# Patient Record
Sex: FEMALE | Race: BLACK OR AFRICAN AMERICAN | NOT HISPANIC OR LATINO | Employment: OTHER | URBAN - METROPOLITAN AREA
[De-identification: names, ages, dates, MRNs, and addresses within clinical notes are randomized per-mention and may not be internally consistent; named-entity substitution may affect disease eponyms.]

---

## 2017-01-11 ENCOUNTER — PATIENT OUTREACH (OUTPATIENT)
Dept: ADMINISTRATIVE | Facility: HOSPITAL | Age: 62
End: 2017-01-11

## 2017-01-11 DIAGNOSIS — Z11.59 NEED FOR HEPATITIS C SCREENING TEST: Primary | ICD-10-CM

## 2017-02-07 ENCOUNTER — LAB VISIT (OUTPATIENT)
Dept: LAB | Facility: HOSPITAL | Age: 62
End: 2017-02-07
Attending: INTERNAL MEDICINE
Payer: COMMERCIAL

## 2017-02-07 DIAGNOSIS — E78.5 DYSLIPIDEMIA: Chronic | ICD-10-CM

## 2017-02-07 LAB
ALBUMIN SERPL BCP-MCNC: 3.5 G/DL
ALP SERPL-CCNC: 71 U/L
ALT SERPL W/O P-5'-P-CCNC: 23 U/L
ANION GAP SERPL CALC-SCNC: 5 MMOL/L
AST SERPL-CCNC: 21 U/L
BILIRUB SERPL-MCNC: 0.5 MG/DL
BUN SERPL-MCNC: 21 MG/DL
CALCIUM SERPL-MCNC: 9.4 MG/DL
CHLORIDE SERPL-SCNC: 105 MMOL/L
CHOLEST/HDLC SERPL: 3.2 {RATIO}
CO2 SERPL-SCNC: 28 MMOL/L
CREAT SERPL-MCNC: 1.1 MG/DL
EST. GFR  (AFRICAN AMERICAN): >60 ML/MIN/1.73 M^2
EST. GFR  (NON AFRICAN AMERICAN): 53.9 ML/MIN/1.73 M^2
GLUCOSE SERPL-MCNC: 90 MG/DL
HDL/CHOLESTEROL RATIO: 31.3 %
HDLC SERPL-MCNC: 144 MG/DL
HDLC SERPL-MCNC: 45 MG/DL
LDLC SERPL CALC-MCNC: 89.4 MG/DL
NONHDLC SERPL-MCNC: 99 MG/DL
POTASSIUM SERPL-SCNC: 4.1 MMOL/L
PROT SERPL-MCNC: 7.2 G/DL
SODIUM SERPL-SCNC: 138 MMOL/L
TRIGL SERPL-MCNC: 48 MG/DL

## 2017-02-07 PROCEDURE — 36415 COLL VENOUS BLD VENIPUNCTURE: CPT | Mod: PO

## 2017-02-07 PROCEDURE — 80061 LIPID PANEL: CPT

## 2017-02-07 PROCEDURE — 80053 COMPREHEN METABOLIC PANEL: CPT

## 2017-03-13 ENCOUNTER — PATIENT MESSAGE (OUTPATIENT)
Dept: RESEARCH | Facility: HOSPITAL | Age: 62
End: 2017-03-13

## 2017-06-23 ENCOUNTER — OFFICE VISIT (OUTPATIENT)
Dept: CARDIOLOGY | Facility: CLINIC | Age: 62
End: 2017-06-23
Payer: COMMERCIAL

## 2017-06-23 VITALS
WEIGHT: 257 LBS | HEART RATE: 72 BPM | BODY MASS INDEX: 38.95 KG/M2 | SYSTOLIC BLOOD PRESSURE: 146 MMHG | HEIGHT: 68 IN | DIASTOLIC BLOOD PRESSURE: 74 MMHG

## 2017-06-23 DIAGNOSIS — I25.10 CORONARY ARTERY DISEASE, OCCLUSIVE: Primary | Chronic | ICD-10-CM

## 2017-06-23 DIAGNOSIS — I20.9 ANGINA PECTORIS: ICD-10-CM

## 2017-06-23 DIAGNOSIS — R07.9 CHEST PAIN, UNSPECIFIED TYPE: ICD-10-CM

## 2017-06-23 DIAGNOSIS — I43 HYPERTENSIVE CARDIOMYOPATHY, WITH HEART FAILURE: ICD-10-CM

## 2017-06-23 DIAGNOSIS — I11.0 HYPERTENSIVE CARDIOMYOPATHY, WITH HEART FAILURE: ICD-10-CM

## 2017-06-23 PROCEDURE — 99214 OFFICE O/P EST MOD 30 MIN: CPT | Mod: S$GLB,,, | Performed by: NUCLEAR MEDICINE

## 2017-06-23 PROCEDURE — 99999 PR PBB SHADOW E&M-EST. PATIENT-LVL III: CPT | Mod: PBBFAC,,, | Performed by: NUCLEAR MEDICINE

## 2017-06-23 RX ORDER — NITROGLYCERIN 0.4 MG/1
0.4 TABLET SUBLINGUAL EVERY 5 MIN PRN
Qty: 30 TABLET | Refills: 6 | Status: SHIPPED | OUTPATIENT
Start: 2017-06-23 | End: 2022-10-06 | Stop reason: SDUPTHER

## 2017-06-23 NOTE — PROGRESS NOTES
Subjective:   Patient ID:  Kate Newman is a 61 y.o. female who presents for follow-up of Coronary Artery Disease (followup) and Hypertension      HPI 1- CHRONIC CAD , ANGINA FC 2- POST PCI   2- ESSENTIAL HTN  NO RECURRENT ANGINA  FOR LAST 3 MONTHS. NO ANGINA EQUIVALENT.  PATTERN OF THAKKAR STABLE-MODERATE TO SEVERE EXERTION- NO ORTHOPNEA OR PND  NO PALPITATIONS. NO NEAR SYNCOPE OR SYNCOPE  NO EDEMA. NO CALVE TENDERNESS. NO  INTERMITTENT CLAUDICATION  NO ABDOMINAL PAIN  NO FOCAL CNS SYMPTOMS OR SIGNS TO SUGGEST TIA OR STROKE  CARD MED GOOD COMPLIANCE      Review of Systems   Constitution: Negative for chills, fever, weakness, night sweats, weight gain and weight loss.   HENT: Negative for headaches and nosebleeds.    Eyes: Negative for blurred vision, double vision and visual disturbance.   Cardiovascular: Negative for chest pain, dyspnea on exertion, irregular heartbeat, leg swelling, orthopnea, palpitations, paroxysmal nocturnal dyspnea and syncope.   Respiratory: Negative for cough, hemoptysis and wheezing.    Endocrine: Negative for polydipsia and polyuria.   Hematologic/Lymphatic: Does not bruise/bleed easily.   Skin: Negative for rash.   Musculoskeletal: Negative for joint pain, joint swelling, muscle weakness and myalgias.   Gastrointestinal: Negative for abdominal pain, hematemesis, jaundice and melena.   Genitourinary: Negative for dysuria, hematuria and nocturia.   Neurological: Negative for dizziness, focal weakness and sensory change.   Psychiatric/Behavioral: Negative for depression. The patient does not have insomnia and is not nervous/anxious.      Family History   Problem Relation Age of Onset    Hypertension Mother     Heart disease Mother     Heart attack Mother 64     MI    Diabetes Father     Osteoarthritis Sister     Heart disease Sister     Cancer Sister      1  sister had cervical ca    Osteoarthritis Brother     Heart disease Brother      Past Medical History:   Diagnosis Date     Arthritis     Asthma     Hypertension      Current Outpatient Prescriptions on File Prior to Visit   Medication Sig Dispense Refill    albuterol (PROVENTIL HFA) 90 mcg/actuation inhaler Inhale 2 puffs into the lungs every 4 (four) hours as needed for Wheezing. 2 puffs two times daily 18 g 2    aspirin (ECOTRIN) 81 MG EC tablet Take 1 tablet (81 mg total) by mouth once daily. 30 tablet 0    atorvastatin (LIPITOR) 20 MG tablet Take 1 tablet (20 mg total) by mouth every evening. 30 tablet 11    hydrochlorothiazide (HYDRODIURIL) 25 MG tablet TAKE ONE TABLET BY MOUTH EVERY DAY 30 tablet 0    isosorbide mononitrate (IMDUR) 30 MG 24 hr tablet Take 1 tablet (30 mg total) by mouth once daily. 30 tablet 6    metoprolol tartrate (LOPRESSOR) 25 MG tablet Take 1 tablet (25 mg total) by mouth 2 (two) times daily. 60 tablet 6    [DISCONTINUED] lisinopril (PRINIVIL,ZESTRIL) 40 MG tablet Take 1 tablet (40 mg total) by mouth once daily. 30 tablet 6    [DISCONTINUED] nitroGLYCERIN (NITROSTAT) 0.4 MG SL tablet Place 1 tablet (0.4 mg total) under the tongue every 5 (five) minutes as needed for Chest pain. 30 tablet 0     No current facility-administered medications on file prior to visit.      Review of patient's allergies indicates:  No Known Allergies    Objective:     Physical Exam   Constitutional: She is oriented to person, place, and time. She appears well-developed. No distress.   HENT:   Head: Normocephalic.   Eyes: Conjunctivae are normal. Pupils are equal, round, and reactive to light. No scleral icterus.   Neck: Normal range of motion. Neck supple. Normal carotid pulses, no hepatojugular reflux and no JVD present. Carotid bruit is not present. No edema present. No thyroid mass and no thyromegaly present.   Cardiovascular: Normal rate, regular rhythm, S1 normal, S2 normal, normal heart sounds and intact distal pulses.  PMI is not displaced.  Exam reveals no gallop and no friction rub.    No murmur  heard.  Pulses:       Carotid pulses are 2+ on the right side, and 2+ on the left side.       Radial pulses are 2+ on the right side, and 2+ on the left side.        Femoral pulses are 2+ on the right side, and 2+ on the left side.       Popliteal pulses are 2+ on the right side, and 2+ on the left side.        Dorsalis pedis pulses are 2+ on the right side, and 2+ on the left side.        Posterior tibial pulses are 2+ on the right side, and 2+ on the left side.   Pulmonary/Chest: Effort normal and breath sounds normal. She has no wheezes. She has no rales. She exhibits no tenderness.   Abdominal: Soft. Bowel sounds are normal. She exhibits no pulsatile midline mass and no mass. There is no hepatosplenomegaly. There is no tenderness.   Musculoskeletal: Normal range of motion. She exhibits no edema or tenderness.        Cervical back: Normal.        Thoracic back: Normal.        Lumbar back: Normal.   Lymphadenopathy:     She has no cervical adenopathy.     She has no axillary adenopathy.        Right: No supraclavicular adenopathy present.        Left: No supraclavicular adenopathy present.   Neurological: She is alert and oriented to person, place, and time. She has normal strength and normal reflexes. No sensory deficit. Gait normal.   Skin: Skin is warm. No rash noted. No cyanosis. No pallor. Nails show no clubbing.   Psychiatric: She has a normal mood and affect. Her speech is normal and behavior is normal. Cognition and memory are normal.       Assessment:     1. Coronary artery disease, occlusive    2. Angina pectoris    3. Hypertensive cardiomyopathy, with heart failure      STABLE CV STATUS-  NO ACTIVE MYOCARDIAL ISCHEMIA  NO ARRYTHMIAS  BP WELL CONTROLLED  NO ACUTE HF  CNS STATUS STABLE   CARD MED WELL TOLERATED  Plan:     Coronary artery disease, occlusive    Angina pectoris    Hypertensive cardiomyopathy, with heart failure    1- CONTINUE PRESENT CARD MANAGEMENT    2- RETURN IN 6 MONTHS.

## 2018-01-31 DIAGNOSIS — I10 BENIGN ESSENTIAL HTN: Primary | ICD-10-CM

## 2019-04-11 ENCOUNTER — TELEPHONE (OUTPATIENT)
Dept: CARDIOLOGY | Facility: CLINIC | Age: 64
End: 2019-04-11

## 2019-04-11 NOTE — TELEPHONE ENCOUNTER
Returned call. Patient needs preop clearance for TKR.  Appt scheduled for 04/16/2019 at 0900, [facility].    Address, phone info provided.

## 2019-04-16 ENCOUNTER — OFFICE VISIT (OUTPATIENT)
Dept: CARDIOLOGY | Facility: CLINIC | Age: 64
End: 2019-04-16
Payer: MEDICAID

## 2019-04-16 ENCOUNTER — CLINICAL SUPPORT (OUTPATIENT)
Dept: CARDIOLOGY | Facility: CLINIC | Age: 64
End: 2019-04-16
Payer: MEDICAID

## 2019-04-16 VITALS
DIASTOLIC BLOOD PRESSURE: 74 MMHG | HEIGHT: 68 IN | WEIGHT: 264 LBS | BODY MASS INDEX: 40.01 KG/M2 | HEART RATE: 65 BPM | SYSTOLIC BLOOD PRESSURE: 132 MMHG

## 2019-04-16 DIAGNOSIS — I25.10 CORONARY ARTERY DISEASE, OCCLUSIVE: Chronic | ICD-10-CM

## 2019-04-16 DIAGNOSIS — R94.31 NONSPECIFIC ABNORMAL ELECTROCARDIOGRAM (ECG) (EKG): ICD-10-CM

## 2019-04-16 DIAGNOSIS — Z01.818 PRE-OP EVALUATION: Primary | ICD-10-CM

## 2019-04-16 DIAGNOSIS — R06.09 DOE (DYSPNEA ON EXERTION): ICD-10-CM

## 2019-04-16 DIAGNOSIS — Z01.818 PRE-OPERATIVE CLEARANCE: Primary | ICD-10-CM

## 2019-04-16 DIAGNOSIS — E66.01 MORBID OBESITY WITH BMI OF 40.0-44.9, ADULT: ICD-10-CM

## 2019-04-16 DIAGNOSIS — Z01.818 PRE-OP EVALUATION: ICD-10-CM

## 2019-04-16 DIAGNOSIS — E78.5 DYSLIPIDEMIA: Chronic | ICD-10-CM

## 2019-04-16 DIAGNOSIS — Z82.49 FAMILY HISTORY OF ISCHEMIC HEART DISEASE (IHD): ICD-10-CM

## 2019-04-16 DIAGNOSIS — I10 ESSENTIAL HYPERTENSION: Chronic | ICD-10-CM

## 2019-04-16 DIAGNOSIS — Z95.5 S/P CORONARY ARTERY STENT PLACEMENT: ICD-10-CM

## 2019-04-16 DIAGNOSIS — E78.5 HYPERLIPIDEMIA, UNSPECIFIED HYPERLIPIDEMIA TYPE: ICD-10-CM

## 2019-04-16 PROCEDURE — 99213 OFFICE O/P EST LOW 20 MIN: CPT | Mod: PBBFAC,PN | Performed by: PHYSICIAN ASSISTANT

## 2019-04-16 PROCEDURE — 99214 PR OFFICE/OUTPT VISIT, EST, LEVL IV, 30-39 MIN: ICD-10-PCS | Mod: S$PBB,,, | Performed by: PHYSICIAN ASSISTANT

## 2019-04-16 PROCEDURE — 93010 ELECTROCARDIOGRAM REPORT: CPT | Mod: S$PBB,,, | Performed by: INTERNAL MEDICINE

## 2019-04-16 PROCEDURE — 99214 OFFICE O/P EST MOD 30 MIN: CPT | Mod: S$PBB,,, | Performed by: PHYSICIAN ASSISTANT

## 2019-04-16 PROCEDURE — 99999 PR PBB SHADOW E&M-EST. PATIENT-LVL III: ICD-10-PCS | Mod: PBBFAC,,, | Performed by: PHYSICIAN ASSISTANT

## 2019-04-16 PROCEDURE — 93005 ELECTROCARDIOGRAM TRACING: CPT | Mod: PBBFAC,PN | Performed by: INTERNAL MEDICINE

## 2019-04-16 PROCEDURE — 99999 PR PBB SHADOW E&M-EST. PATIENT-LVL III: CPT | Mod: PBBFAC,,, | Performed by: PHYSICIAN ASSISTANT

## 2019-04-16 PROCEDURE — 93010 EKG 12-LEAD: ICD-10-PCS | Mod: S$PBB,,, | Performed by: INTERNAL MEDICINE

## 2019-04-16 RX ORDER — OXYBUTYNIN CHLORIDE 15 MG/1
5 TABLET, EXTENDED RELEASE ORAL DAILY
COMMUNITY

## 2019-04-16 NOTE — PROGRESS NOTES
"Subjective:    Patient ID:  Kate Lira is a 63 y.o. female who presents for follow-up of Pre-op Exam      HPI   Ms. Lira is a 63 year old female patient whose current medical conditions include CAD s/p PCI of distal LAD in 2015, dyslipidemia, HTN, and obesity who presents today for follow-up and pre-op clearance. Patient last seen in clinic by Dr. Morgan in 2017. She returns today and states she is doing ok. Complains of occasional "sticking" chest discomfort, substernal or underneath her left breast. Seems to happen at night when she lies down. Not precipitated by exertion. Denies any radiation of discomfort or any associated nausea, vomiting, or diaphoresis. Reports THAKKAR when she walks/exerts herself, states this has been a chronic issue. No change in the past 6 months. Do report occasional palpitations at night. No dizziness, lightheadedness, near syncope, or syncope. BP ok today in clinic. Patient reports compliance with her medications, states last sublingual nitro usage was approximately 2 months ago. Activity is limited due to right knee pain, patient states she could not walk up two flights of stairs. EKG today reviewed, shows SR, LVH changes.      Review of Systems   Constitution: Negative for chills, decreased appetite, fever and malaise/fatigue.   HENT: Negative for congestion, hoarse voice and sore throat.    Eyes: Negative for blurred vision and discharge.   Cardiovascular: Positive for chest pain (atypical) and dyspnea on exertion. Negative for claudication, cyanosis, irregular heartbeat, leg swelling, near-syncope, orthopnea, palpitations and paroxysmal nocturnal dyspnea.   Respiratory: Negative for cough, hemoptysis, shortness of breath, snoring, sputum production and wheezing.    Endocrine: Negative for cold intolerance and heat intolerance.   Hematologic/Lymphatic: Negative for bleeding problem. Does not bruise/bleed easily.   Skin: Negative for rash.   Musculoskeletal: Positive for " "arthritis and joint pain. Negative for back pain, joint swelling, muscle cramps, muscle weakness and myalgias.   Gastrointestinal: Negative for abdominal pain, constipation, diarrhea, heartburn, melena and nausea.   Genitourinary: Negative for hematuria.   Neurological: Negative for dizziness, focal weakness, headaches, light-headedness, loss of balance, numbness, paresthesias, seizures and weakness.   Psychiatric/Behavioral: Negative for memory loss. The patient does not have insomnia.    Allergic/Immunologic: Negative for hives.     /74 (BP Location: Right arm, Patient Position: Sitting, BP Method: Large (Manual))   Pulse 65   Ht 5' 8" (1.727 m)   Wt 119.7 kg (264 lb)   BMI 40.14 kg/m²   Objective:    Physical Exam   Constitutional: She is oriented to person, place, and time. She appears well-developed and well-nourished. No distress.   HENT:   Head: Normocephalic and atraumatic.   Eyes: Pupils are equal, round, and reactive to light. Right eye exhibits no discharge. Left eye exhibits no discharge.   Neck: Neck supple. No JVD present.   Cardiovascular: Normal rate, regular rhythm, S1 normal, S2 normal and normal heart sounds.   No murmur heard.  Pulmonary/Chest: Effort normal and breath sounds normal. No respiratory distress. She has no wheezes. She has no rales.   Abdominal: Soft. She exhibits no distension.   Musculoskeletal: She exhibits no edema.   Neurological: She is alert and oriented to person, place, and time.   Skin: Skin is warm and dry. She is not diaphoretic. No erythema.   Psychiatric: She has a normal mood and affect. Her behavior is normal. Thought content normal.   Nursing note and vitals reviewed.      Assessment:       1. Pre-operative clearance    2. Coronary artery disease, occlusive    3. Dyslipidemia    4. Essential hypertension    5. Hyperlipidemia, unspecified hyperlipidemia type    6. S/P coronary artery stent placement    7. Morbid obesity with BMI of 40.0-44.9, adult    8. " Family history of ischemic heart disease (IHD)    9. Nonspecific abnormal electrocardiogram (ECG) (EKG)    10. THAKKAR (dyspnea on exertion)      Patient presents for f/u. Doing ok however she is having some atypical chest pain symptoms and THAKKAR. Given her history of CAD s/p stent and limited activity tolerance, will proceed with MPI stress test and 2D echo for further evaluation. Continue same meds. Clearance pending results of above tests.   Plan:   -Continue current medical management and risk factor modification  -Cardiac, low salt diet  -Stress test, 2D echo, phone review  -Clearance pending results  -Follow-up TBA    Chart reviewed. Dr. Morgan agrees with plan as outlined above.     ADDENDUM:  Stress test negative for ischemia/injury. 2D echo showed normal EF. Ok to proceed with surgery at moderate risk of manuel and post OP CV complications.

## 2019-04-30 ENCOUNTER — HOSPITAL ENCOUNTER (OUTPATIENT)
Dept: RADIOLOGY | Facility: HOSPITAL | Age: 64
Discharge: HOME | End: 2019-04-30
Attending: PHYSICIAN ASSISTANT
Payer: MEDICAID

## 2019-04-30 ENCOUNTER — CLINICAL SUPPORT (OUTPATIENT)
Dept: CARDIOLOGY | Facility: CLINIC | Age: 64
End: 2019-04-30
Attending: PHYSICIAN ASSISTANT
Payer: MEDICAID

## 2019-04-30 DIAGNOSIS — E78.5 DYSLIPIDEMIA: Chronic | ICD-10-CM

## 2019-04-30 DIAGNOSIS — E78.5 HYPERLIPIDEMIA, UNSPECIFIED HYPERLIPIDEMIA TYPE: ICD-10-CM

## 2019-04-30 DIAGNOSIS — I10 ESSENTIAL HYPERTENSION: Chronic | ICD-10-CM

## 2019-04-30 DIAGNOSIS — Z95.5 S/P CORONARY ARTERY STENT PLACEMENT: ICD-10-CM

## 2019-04-30 DIAGNOSIS — I25.10 CORONARY ARTERY DISEASE, OCCLUSIVE: Chronic | ICD-10-CM

## 2019-04-30 DIAGNOSIS — E66.01 MORBID OBESITY WITH BMI OF 40.0-44.9, ADULT: ICD-10-CM

## 2019-04-30 LAB
DIASTOLIC DYSFUNCTION: NO
DIASTOLIC DYSFUNCTION: NO
ESTIMATED PA SYSTOLIC PRESSURE: 23.43
MITRAL VALVE REGURGITATION: NORMAL
RETIRED EF AND QEF - SEE NOTES: 55 (ref 55–65)
TRICUSPID VALVE REGURGITATION: NORMAL

## 2019-04-30 PROCEDURE — 78452 NM MULTI PHARM STRESS CARDIAC COMPONENT: ICD-10-PCS | Mod: 26,,, | Performed by: INTERNAL MEDICINE

## 2019-04-30 PROCEDURE — 93016 CV STRESS TEST SUPVJ ONLY: CPT | Mod: S$PBB,,, | Performed by: INTERNAL MEDICINE

## 2019-04-30 PROCEDURE — A9502 TC99M TETROFOSMIN: HCPCS

## 2019-04-30 PROCEDURE — 93018 CV STRESS TEST I&R ONLY: CPT | Mod: S$PBB,,, | Performed by: INTERNAL MEDICINE

## 2019-04-30 PROCEDURE — 93306 2D ECHO WITH COLOR FLOW DOPPLER: ICD-10-PCS | Mod: 26,S$PBB,, | Performed by: INTERNAL MEDICINE

## 2019-04-30 PROCEDURE — 78452 HT MUSCLE IMAGE SPECT MULT: CPT | Mod: 26,,, | Performed by: INTERNAL MEDICINE

## 2019-04-30 PROCEDURE — 93306 TTE W/DOPPLER COMPLETE: CPT | Mod: PBBFAC,PN | Performed by: INTERNAL MEDICINE

## 2019-04-30 PROCEDURE — 93018 NM MULTI PHARM STRESS CARDIAC COMPONENT: ICD-10-PCS | Mod: S$PBB,,, | Performed by: INTERNAL MEDICINE

## 2019-04-30 PROCEDURE — 93016 NM MULTI PHARM STRESS CARDIAC COMPONENT: ICD-10-PCS | Mod: S$PBB,,, | Performed by: INTERNAL MEDICINE

## 2019-05-01 ENCOUNTER — TELEPHONE (OUTPATIENT)
Dept: CARDIOLOGY | Facility: CLINIC | Age: 64
End: 2019-05-01

## 2019-05-01 NOTE — TELEPHONE ENCOUNTER
Please phone patient. She passed her stress test. Echo showed normal heart function.    Ok to proceed with surgery at moderate risk of manuel and post OP CV complications.    I will addend my clinic note, find out where we need to fax it    Thanks

## 2019-05-01 NOTE — TELEPHONE ENCOUNTER
----- Message from Crystal Magaña sent at 5/1/2019  8:50 AM CDT -----  Contact: self [phone]  States that the fax number that stress test and echo results can be faxed to is [phone] Attn: David Melendez. Please call back at [phone]//thank you acc

## 2019-05-01 NOTE — TELEPHONE ENCOUNTER
Returned pt call informed pt I have received fax number and faxed over clearance to David Melendez fax number [phone] fax confirmation received.

## 2019-05-01 NOTE — TELEPHONE ENCOUNTER
The patient has been notified of this information and all questions answered.      Pt states she will call back with information to fax clearance. I provided pt with our office call back number [phone].

## 2019-12-11 ENCOUNTER — OFFICE VISIT (OUTPATIENT)
Dept: OPHTHALMOLOGY | Facility: CLINIC | Age: 64
End: 2019-12-11
Payer: COMMERCIAL

## 2019-12-11 DIAGNOSIS — H04.129 DRY EYE: ICD-10-CM

## 2019-12-11 DIAGNOSIS — H40.052 OCULAR HYPERTENSION OF LEFT EYE: Primary | ICD-10-CM

## 2019-12-11 DIAGNOSIS — H40.013 OPEN ANGLE WITH BORDERLINE FINDINGS OF BOTH EYES: ICD-10-CM

## 2019-12-11 PROCEDURE — 99999 PR PBB SHADOW E&M-EST. PATIENT-LVL I: CPT | Mod: PBBFAC,,, | Performed by: OPTOMETRIST

## 2019-12-11 PROCEDURE — 92002 PR EYE EXAM, NEW PATIENT,INTERMED: ICD-10-PCS | Mod: S$GLB,,, | Performed by: OPTOMETRIST

## 2019-12-11 PROCEDURE — 92002 INTRM OPH EXAM NEW PATIENT: CPT | Mod: S$GLB,,, | Performed by: OPTOMETRIST

## 2019-12-11 PROCEDURE — 99999 PR PBB SHADOW E&M-EST. PATIENT-LVL I: ICD-10-PCS | Mod: PBBFAC,,, | Performed by: OPTOMETRIST

## 2019-12-11 PROCEDURE — 92133 CPTRZD OPH DX IMG PST SGM ON: CPT | Mod: S$GLB,,, | Performed by: OPTOMETRIST

## 2019-12-11 PROCEDURE — 92133 POSTERIOR SEGMENT OCT OPTIC NERVE(OCULAR COHERENCE TOMOGRAPHY) - OU - BOTH EYES: ICD-10-PCS | Mod: S$GLB,,, | Performed by: OPTOMETRIST

## 2019-12-11 RX ORDER — LATANOPROST 50 UG/ML
1 SOLUTION/ DROPS OPHTHALMIC NIGHTLY
Qty: 1 BOTTLE | Refills: 4 | Status: SHIPPED | OUTPATIENT
Start: 2019-12-11 | End: 2020-04-27

## 2019-12-11 NOTE — PROGRESS NOTES
HPI     NP to DNL  Patient here today for eye irritation. Patient states the left eye is more   irritated than the right. Symptoms have been present for about 2 weeks.  Pain Scale:  4  Onset:   2 weeks  OD, OS, OU:   OU OS>OD  Discharge:   No  A.M. Matting:  No  Itch:   Yes  Redness:   No  Photophobia:   No  Foreign body sensation:   Yes  Deep pain:   No  Previous occurrence:   No  Drops:   Dry eyes Drops    Last edited by Ashley Beltran, PCT on 12/11/2019  8:29 AM.   (History)            Assessment /Plan     For exam results, see Encounter Report.    Ocular hypertension of left eye  -     latanoprost 0.005 % ophthalmic solution; Place 1 drop into both eyes every evening.  Dispense: 1 Bottle; Refill: 4    Open angle with borderline findings of both eyes  Suspect based on increased IOP OS>OD  Start latanoprost    Dry eye  Start Theratears bid OU    RTC 1-2 weeks for 24-2VF, dilation and Optos SDPs with Pach  Discussed above and all questions were answered.

## 2019-12-30 ENCOUNTER — OFFICE VISIT (OUTPATIENT)
Dept: OPHTHALMOLOGY | Facility: CLINIC | Age: 64
End: 2019-12-30
Payer: COMMERCIAL

## 2019-12-30 DIAGNOSIS — H40.1111 PRIMARY OPEN ANGLE GLAUCOMA (POAG) OF RIGHT EYE, MILD STAGE: ICD-10-CM

## 2019-12-30 DIAGNOSIS — H04.129 DRY EYE: ICD-10-CM

## 2019-12-30 DIAGNOSIS — H40.1123 PRIMARY OPEN ANGLE GLAUCOMA (POAG) OF LEFT EYE, SEVERE STAGE: Primary | ICD-10-CM

## 2019-12-30 PROCEDURE — 92250 COLOR FUNDUS PHOTOGRAPHY - OU - BOTH EYES: ICD-10-PCS | Mod: S$GLB,,, | Performed by: OPTOMETRIST

## 2019-12-30 PROCEDURE — 92250 FUNDUS PHOTOGRAPHY W/I&R: CPT | Mod: S$GLB,,, | Performed by: OPTOMETRIST

## 2019-12-30 PROCEDURE — 92083 HUMPHREY VISUAL FIELD - OU - BOTH EYES: ICD-10-PCS | Mod: S$GLB,,, | Performed by: OPTOMETRIST

## 2019-12-30 PROCEDURE — 92014 PR EYE EXAM, EST PATIENT,COMPREHESV: ICD-10-PCS | Mod: S$GLB,,, | Performed by: OPTOMETRIST

## 2019-12-30 PROCEDURE — 99999 PR PBB SHADOW E&M-EST. PATIENT-LVL I: ICD-10-PCS | Mod: PBBFAC,,, | Performed by: OPTOMETRIST

## 2019-12-30 PROCEDURE — 92014 COMPRE OPH EXAM EST PT 1/>: CPT | Mod: S$GLB,,, | Performed by: OPTOMETRIST

## 2019-12-30 PROCEDURE — 92083 EXTENDED VISUAL FIELD XM: CPT | Mod: S$GLB,,, | Performed by: OPTOMETRIST

## 2019-12-30 PROCEDURE — 99999 PR PBB SHADOW E&M-EST. PATIENT-LVL I: CPT | Mod: PBBFAC,,, | Performed by: OPTOMETRIST

## 2019-12-30 RX ORDER — BRIMONIDINE TARTRATE 2 MG/ML
1 SOLUTION/ DROPS OPHTHALMIC 3 TIMES DAILY
Qty: 5 ML | Refills: 2 | Status: SHIPPED | OUTPATIENT
Start: 2019-12-30 | End: 2020-03-20

## 2019-12-30 RX ORDER — MELOXICAM 15 MG/1
15 TABLET ORAL
COMMUNITY
Start: 2019-12-10 | End: 2020-12-09

## 2019-12-30 NOTE — PROGRESS NOTES
HPI     Pt here for 3 wk HVF SDP chk. 100% compliant with Latanoprost  HPI    Any vision changes since last exam: No   Eye pain: None  Other ocular symptoms: No    Do you wear currently wear glasses or contacts? Readers    Interested in contacts today? No    Do you plan on getting new glasses today? If needed        Last edited by Urban Martin, Patient Care Assistant on 12/30/2019 10:40   AM. (History)            Assessment /Plan     For exam results, see Encounter Report.    Primary open angle glaucoma (POAG) of left eye, severe stage  -     Fitch Visual Field - OU - Extended - Both Eyes  -     Color Fundus Photography - OU - Both Eyes    Staged today based on HVF and Optic nerve findings. APD present OS today. Discussed SLT vs. Additional med and patient chooses to add brimonidine TID OS.  Continue Latanoprost qhs OU      Primary open angle glaucoma (POAG) of right eye, mild stage  Mild stage based on HVF findings (early superior arc). Low IOP, and good optic nerve exam. Will follow, and continue latanoprost.    Dry eye      RTC 6 weeks for IOP check or PRN.

## 2020-02-10 ENCOUNTER — OFFICE VISIT (OUTPATIENT)
Dept: OPHTHALMOLOGY | Facility: CLINIC | Age: 65
End: 2020-02-10
Payer: COMMERCIAL

## 2020-02-10 DIAGNOSIS — H40.1111 PRIMARY OPEN ANGLE GLAUCOMA (POAG) OF RIGHT EYE, MILD STAGE: ICD-10-CM

## 2020-02-10 DIAGNOSIS — H40.1123 PRIMARY OPEN ANGLE GLAUCOMA (POAG) OF LEFT EYE, SEVERE STAGE: Primary | ICD-10-CM

## 2020-02-10 PROCEDURE — 92012 PR EYE EXAM, EST PATIENT,INTERMED: ICD-10-PCS | Mod: S$GLB,,, | Performed by: OPTOMETRIST

## 2020-02-10 PROCEDURE — 99999 PR PBB SHADOW E&M-EST. PATIENT-LVL I: CPT | Mod: PBBFAC,,, | Performed by: OPTOMETRIST

## 2020-02-10 PROCEDURE — 99999 PR PBB SHADOW E&M-EST. PATIENT-LVL I: ICD-10-PCS | Mod: PBBFAC,,, | Performed by: OPTOMETRIST

## 2020-02-10 PROCEDURE — 92012 INTRM OPH EXAM EST PATIENT: CPT | Mod: S$GLB,,, | Performed by: OPTOMETRIST

## 2020-02-10 NOTE — PROGRESS NOTES
HPI     The patient is here for a 6 week IOP check. The patient is using   Latanoprost QHS OU and Brimonidine TID OS. The patient states her eyes are   feeling better and she denies any ocular pain at this time. 100% drop   compliance    Last edited by Karen Flowers on 2/10/2020  9:06 AM. (History)            Assessment /Plan     For exam results, see Encounter Report.    Primary open angle glaucoma (POAG) of left eye, severe stage    Primary open angle glaucoma (POAG) of right eye, mild stage      IOP stable today and within acceptable range OU  Continue current treatment  Monitor 4 months    Latanoprost qhs OU  Brimonidine tid OS    RTC 3-4 months for IOP check or PRN  Discussed above and all questions were answered.

## 2020-02-18 ENCOUNTER — HOSPITAL ENCOUNTER (OUTPATIENT)
Dept: RADIOLOGY | Facility: HOSPITAL | Age: 65
Discharge: HOME | End: 2020-02-18
Attending: PHYSICAL MEDICINE & REHABILITATION
Payer: COMMERCIAL

## 2020-02-18 ENCOUNTER — TELEPHONE (OUTPATIENT)
Dept: PAIN MEDICINE | Facility: CLINIC | Age: 65
End: 2020-02-18

## 2020-02-18 ENCOUNTER — OFFICE VISIT (OUTPATIENT)
Dept: PHYSICAL MEDICINE AND REHAB | Facility: CLINIC | Age: 65
End: 2020-02-18
Payer: COMMERCIAL

## 2020-02-18 ENCOUNTER — PATIENT MESSAGE (OUTPATIENT)
Dept: PHYSICAL MEDICINE AND REHAB | Facility: CLINIC | Age: 65
End: 2020-02-18

## 2020-02-18 VITALS
BODY MASS INDEX: 40.01 KG/M2 | HEART RATE: 62 BPM | WEIGHT: 264 LBS | HEIGHT: 68 IN | SYSTOLIC BLOOD PRESSURE: 198 MMHG | DIASTOLIC BLOOD PRESSURE: 85 MMHG | RESPIRATION RATE: 14 BRPM

## 2020-02-18 DIAGNOSIS — M47.816 LUMBAR FACET ARTHROPATHY: Primary | ICD-10-CM

## 2020-02-18 DIAGNOSIS — M47.26 OSTEOARTHRITIS OF SPINE WITH RADICULOPATHY, LUMBAR REGION: ICD-10-CM

## 2020-02-18 DIAGNOSIS — M47.816 LUMBAR FACET ARTHROPATHY: ICD-10-CM

## 2020-02-18 DIAGNOSIS — R26.9 IMPAIRED GAIT: ICD-10-CM

## 2020-02-18 PROCEDURE — 99204 OFFICE O/P NEW MOD 45 MIN: CPT | Mod: S$GLB,,, | Performed by: PHYSICAL MEDICINE & REHABILITATION

## 2020-02-18 PROCEDURE — 3008F BODY MASS INDEX DOCD: CPT | Mod: CPTII,S$GLB,, | Performed by: PHYSICAL MEDICINE & REHABILITATION

## 2020-02-18 PROCEDURE — 3077F PR MOST RECENT SYSTOLIC BLOOD PRESSURE >= 140 MM HG: ICD-10-PCS | Mod: CPTII,S$GLB,, | Performed by: PHYSICAL MEDICINE & REHABILITATION

## 2020-02-18 PROCEDURE — 3079F PR MOST RECENT DIASTOLIC BLOOD PRESSURE 80-89 MM HG: ICD-10-PCS | Mod: CPTII,S$GLB,, | Performed by: PHYSICAL MEDICINE & REHABILITATION

## 2020-02-18 PROCEDURE — 3008F PR BODY MASS INDEX (BMI) DOCUMENTED: ICD-10-PCS | Mod: CPTII,S$GLB,, | Performed by: PHYSICAL MEDICINE & REHABILITATION

## 2020-02-18 PROCEDURE — 72110 X-RAY EXAM L-2 SPINE 4/>VWS: CPT | Mod: TC

## 2020-02-18 PROCEDURE — 72110 X-RAY EXAM L-2 SPINE 4/>VWS: CPT | Mod: 26,,, | Performed by: RADIOLOGY

## 2020-02-18 PROCEDURE — 99204 PR OFFICE/OUTPT VISIT, NEW, LEVL IV, 45-59 MIN: ICD-10-PCS | Mod: S$GLB,,, | Performed by: PHYSICAL MEDICINE & REHABILITATION

## 2020-02-18 PROCEDURE — 3079F DIAST BP 80-89 MM HG: CPT | Mod: CPTII,S$GLB,, | Performed by: PHYSICAL MEDICINE & REHABILITATION

## 2020-02-18 PROCEDURE — 99999 PR PBB SHADOW E&M-EST. PATIENT-LVL IV: CPT | Mod: PBBFAC,,, | Performed by: PHYSICAL MEDICINE & REHABILITATION

## 2020-02-18 PROCEDURE — 3077F SYST BP >= 140 MM HG: CPT | Mod: CPTII,S$GLB,, | Performed by: PHYSICAL MEDICINE & REHABILITATION

## 2020-02-18 PROCEDURE — 99999 PR PBB SHADOW E&M-EST. PATIENT-LVL IV: ICD-10-PCS | Mod: PBBFAC,,, | Performed by: PHYSICAL MEDICINE & REHABILITATION

## 2020-02-18 PROCEDURE — 72110 XR LUMBAR SPINE 5 VIEW WITH FLEX AND EXT: ICD-10-PCS | Mod: 26,,, | Performed by: RADIOLOGY

## 2020-02-18 RX ORDER — DICLOFENAC SODIUM 10 MG/G
GEL TOPICAL
COMMUNITY

## 2020-02-18 RX ORDER — ERGOCALCIFEROL 1.25 MG/1
50000 CAPSULE ORAL
COMMUNITY
Start: 2020-02-05 | End: 2021-02-04

## 2020-02-18 RX ORDER — HYDROXYZINE PAMOATE 100 MG/1
1 CAPSULE ORAL 2 TIMES DAILY PRN
COMMUNITY
Start: 2019-12-10

## 2020-02-18 RX ORDER — FLUTICASONE FUROATE AND VILANTEROL 100; 25 UG/1; UG/1
1 POWDER RESPIRATORY (INHALATION)
COMMUNITY
Start: 2019-01-29

## 2020-02-18 NOTE — TELEPHONE ENCOUNTER
----- Message from Cheryl Carlisle LPN sent at 2/18/2020  2:06 PM CST -----  Contact: self      ----- Message -----  From: Morgan Rangel  Sent: 2/18/2020   2:02 PM CST  To: [facility] Staff    Type:  Patient Returning Call    Who Called:pt  Who Left Message for Patient:nadine  Does the patient know what this is regarding?:no  Would the patient rather a call back or a response via [facility]? Call back  Best Call Back Number:[phone]  Additional Information: none    Thanks,  Morgan Rangel

## 2020-02-18 NOTE — PROGRESS NOTES
PM&R NEW PATIENT HISTORY & PHYSICAL :    Referring Physician:    Chief Complaint   Patient presents with    Back Pain     lower back pain, on the right       HPI: This is a 64 y.o.  female being seen in clinic today for evaluation of chronic achy low back and knee pain.  She has DJD and needs a right TKA.  She reports increased achy pain in her back that radiates to her hip with prolonged walking or standing.  Sitting and resting provide minimal relief.  She denies numbness in her legs, but feels weakness. She reports left TKA in the past as well.     History obtained from patient    Functional History:  Walking: limited  Transfers: Independent  Assistive devices: No  Power mobility: No  Falls: None       Needs help with:  Nothing - all ADLS normal    Cooking   Cleaning  Bathing   Dressing   Toileting     Past family, medical, social, and surgical history reviewed in chart    Review of Systems:     General- denies lethargy, weight change, fever, chills  Head/neck- denies swallowing difficulties  ENT- denies hearing changes  Cardiovascular-denies chest pain  Pulmonary- denies shortness of breath  GI- denies constipation or bowel incontinence  - denies bladder incontinence  Skin- denies wounds or rashes  Musculoskeletal- +/-weakness, +pain  Neurologic- denies numbness and tingling  Psychiatric- denies depressive or psychotic features, denies anxiety  Lymphatic-denies swelling  Endocrine- denies hypoglycemic symptoms/DM history  All other pertinent systems negative     Physical Examination:  General: Well developed, well nourished female, NAD  HEENT:NCAT EOMI bilaterally   Pulmonary:Normal respirations    Spinal Examination: CERVICAL  Active ROM is within normal limits.  Inspection: No deformity of spinal alignment.    Spinal Examination: LUMBAR or THORACIC  Active ROM is limited in all planes  Inspection: No deformity of spinal alignment.  No palpable olisthesis. Right hip slightly higher than left  Palpation: No  vertebral tenderness to percussion.  ttp at si joints, gt bursa, paraspinals,   Facet loading +bilaterally  SLR Test (seated and supine):negative to greater than 70 degrees bilaterally  Able to stand on heels and toes    Bilateral Upper and Lower Extremities:  Pulses are 2+ at radial,  bilaterally.  Shoulder/Elbow/Wrist/Hand ROM   Hip/Knee/Ankle ROM wnl except limited full knee ext on right  Bilateral Extremities show normal capillary refill.  No signs of cyanosis, rubor, edema, skin changes, or dysvascular changes of appendages.  Nails appear intact.    Neurological Exam:  Cranial Nerves:  II-XII grossly intact    Manual Muscle Testing: (Motor 5=normal)  RIGHT Lower extremity: Hip flexion4/5, Hip Abduction 4/5, Hip Adduction 4/5, Knee extension 4/5, Knee flexion 5/5, Ankle dorsiflexion 5/5, Extensor hallucis longus 5/5, Ankle plantarflexion 5/5  LEFT Lower extremity:  Hip flexion 4/5, Hip Abduction 4/5,Hip Adduction 4/5, Knee extension 5/5, Knee flexion 5/5, Ankle dorsiflexion 5/5, Extensor hallucis longus 5/5, Ankle plantarflexion 5/5    No focal atrophy is noted of either lower extremity.    Bilateral Reflexes:hypo at patellar  No clonus at knee or ankle.    Sensation: tested to light touch  - intact in legs  Gait: poor posture, forward flexed gait, limited hip and knee ROM, antalgic favoring right       IMPRESSION/PLAN: This is a 64 y.o.  female with lumbar facet arthropathy, lumbar DDD, impaired gait    1. Rx for PT-Eclipse--Core, hip, and lower ext strengthening, stretch, ROM, modalities, dec pain, HEP, gait  2. Cont tylenol arthritis TID prn  3.  Ice/heat modalities and continue topical agents prn  4. Refer to IPM  5. lspine xrays today    Laila Jo M.D.  Physical Medicine and Rehab

## 2020-02-18 NOTE — PATIENT INSTRUCTIONS
Back Care Tips    Caring for your back  These are things you can do to prevent a recurrence of acute back pain and to reduce symptoms from chronic back pain:  · Maintain a healthy weight. If you are overweight, losing weight will help most types of back pain.  · Exercise is an important part of recovery from most types of back pain. The muscles behind and in front of the spine support the back. This means strengthening both the back muscles and the abdominal muscles will provide better support for your spine.   · Swimming and brisk walking are good overall exercises to improve your fitness level.  · Practice safe lifting methods (below).  · Practice good posture when sitting, standing and walking. Avoid prolonged sitting. This puts more stress on the lower back than standing or walking.  · Wear quality shoes with sufficient arch support. Foot and ankle alignment can affect back symptoms. Women should avoid wearing high heels.  · Therapeutic massage can help relax the back muscles without stretching them.  · During the first 24 to 72 hours after an acute injury or flare-up of chronic back pain, apply an ice pack to the painful area for 20 minutes and then remove it for 20 minutes, over a period of 60 to 90 minutes, or several times a day. As a safety precaution, do not use a heating pad at bedtime. Sleeping on a heating pad can lead to skin burns or tissue damage.  · You can alternate ice and heat therapies.  Medications  Talk to your healthcare provider before using medicines, especially if you have other medical problems or are taking other medicines.  · You may use acetaminophen or ibuprofen to control pain, unless your healthcare provider prescribed other pain medicine. If you have chronic conditions like diabetes, liver or kidney disease, stomach ulcers, or gastrointestinal bleeding, or are taking blood thinners, talk with your healthcare provider before taking any medicines.  · Be careful if you are given  prescription pain medicines, narcotics, or medicine for muscle spasm. They can cause drowsiness, affect your coordination, reflexes, and judgment. Do not drive or operate heavy machinery while taking these types of medicines. Take prescription pain medicine only as prescribed by your healthcare provider.  Lumbar stretch  Here is a simple stretching exercise that will help relax muscle spasm and keep your back more limber. If exercise makes your back pain worse, dont do it.  · Lie on your back with your knees bent and both feet on the ground.  · Slowly raise your left knee to your chest as you flatten your lower back against the floor. Hold for 5 seconds.  · Relax and repeat the exercise with your right knee.  · Do 10 of these exercises for each leg.  Safe lifting method  · Dont bend over at the waist to lift an object off the floor.  Instead, bend your knees and hips in a squat.   · Keep your back and head upright  · Hold the object close to your body, directly in front of you.  · Straighten your legs to lift the object.   · Lower the object to the floor in the reverse fashion.  · If you must slide something across the floor, push it.  Posture tips  Sitting  Sit in chairs with straight backs or low-back support. Keep your knees lower than your hips, with your feet flat on the floor.  When driving, sit up straight. Adjust the seat forward so you are not leaning toward the steering wheel.  A small pillow or rolled towel behind your lower back may help if you are driving long distances.   Standing  When standing for long periods, shift most of your weight to one leg at a time. Alternate legs every few minutes.   Sleeping  The best way to sleep is on your side with your knees bent. Put a low pillow under your head to support your neck in a neutral spine position. Avoid thick pillows that bend your neck to one side. Put a pillow between your legs to further relax your lower back. If you sleep on your back, put pillows  under your knees to support your legs in a slightly flexed position. Use a firm mattress. If your mattress sags, replace it, or use a 1/2-inch plywood board under the mattress to add support.  Follow-up care  Follow up with your healthcare provider, or as advised.  If X-rays, a CT scan or an MRI scan were taken, they will be reviewed by a radiologist. You will be notified of any new findings that may affect your care.  Call 911  Seek emergency medical care if any of the following occur:  · Trouble breathing  · Confusion  · Very drowsy  · Fainting or loss of consciousness  · Rapid or very slow heart rate  · Loss of  bowel or bladder control  When to seek medical care  Call your healthcare provider if any of the following occur:  · Pain becomes worse or spreads to your arms or legs  · Weakness or numbness in one or both arms or legs  · Numbness in the groin area  Date Last Reviewed: 6/1/2016  © 4331-6434 [employer]. [address] [place] [address]. All rights reserved. This information is not intended as a substitute for professional medical care. Always follow your healthcare professional's instructions.        Exercises to Strengthen Your Lower Back  Strong lower back and abdominal muscles work together to support your spine. The exercises below will help strengthen the lower back. It is important that you begin exercising slowly and increase levels gradually.  Always begin any exercise program with stretching. If you feel pain while doing any of these exercises, stop and talk to your doctor about a more specific exercise program that better suits your condition.   Low back stretch  The point of stretching is to make you more flexible and increase your range of motion. Stretch only as much as you are able. Stretch slowly. Do not push your stretch to the limit. If at any point you feel pain while stretching, this is your (temporary) limit.  · Lie on your back with your knees bent and both  feet on the ground.  · Slowly raise your left knee to your chest as you flatten your lower back against the floor. Hold for 5 seconds.  · Relax and repeat the exercise with your right knee.  · Do 10 of these exercises for each leg.  · Repeat hugging both knees to your chest at the same time.  Building lower back strength  Start your exercise routine with 10 to 30 minutes a day, 1 to 3 times a day.  Initial exercises  Lying on your back:  1. Ankle pumps: Move your foot up and down, towards your head, and then away. Repeat 10 times with each foot.  2. Heel slides: Slowly bend your knee, drawing the heel of your foot towards you. Then slide your heel/foot from you, straightening your knee. Do not lift your foot off the floor (this is not a leg lift).  3. Abdominal contraction: Bend your knees and put your hands on your stomach. Tighten your stomach muscles. Hold for 5 seconds, then relax. Repeat 10 times.  4. Straight leg raise: Bend one leg at the knee and keep the other leg straight. Tighten your stomach muscles. Slowly lift your straight leg 6 to 12 inches off the floor and hold for up to 5 seconds. Repeat 10 times on each side.  Standin. Wall squats: Stand with your back against the wall. Move your feet about 12 inches away from the wall. Tighten your stomach muscles, and slowly bend your knees until they are at about a 45 degree angle. Do not go down too far. Hold about 5 seconds. Then slowly return to your starting position. Repeat 10 times.  2. Heel raises: Stand facing the wall. Slowly raise the heels of your feet up and down, while keeping your toes on the floor. If you have trouble balancing, you can touch the wall with your hands. Repeat 10 times.  More advanced exercises  When you feel comfortable enough, try these exercises.  1. Kneeling lumbar extension: Begin on your hands and knees. At the same time, raise and straighten your right arm and left leg until they are parallel to the ground. Hold for 2  seconds and come back slowly to a starting position. Repeat with left arm and right leg, alternating 10 times.  2. Prone lumbar extension: Lie face down, arms extended overhead, palms on the floor. At the same time, raise your right arm and left leg as high as comfortably possible. Hold for 10 seconds and slowly return to start. Repeat with left arm and right leg, alternating 10 times. Gradually build up to 20 times. (Advanced: Repeat this exercise raising both arms and both legs a few inches off the floor at the same time. Hold for 5 seconds and release.)  3. Pelvic tilt: Lie on the floor on your back with your knees bent at 90 degrees. Your feet should be flat on the floor. Inhale, exhale, then slowly contract your abdominal muscles bringing your navel toward your spine. Let your pelvis rock back until your lower back is flat on the floor. Hold for 10 seconds while breathing smoothly.  4. Abdominal crunch: Perform a pelvic tilt (above) flattening your lower back against the floor. Holding the tension in your abdominal muscles, take another breath and raise your shoulder blades off the ground (this is not a full sit-up). Keep your head in line with your body (dont bend your neck forward). Hold for 2 seconds, then slowly lower.  Date Last Reviewed: 6/1/2016  © 8658-8881 [employer]. [address] [place] [address], [place] [address]. All rights reserved. This information is not intended as a substitute for professional medical care. Always follow your healthcare professional's instructions.        Relieving Back Pain  Back pain is a common problem. You can strain back muscles by lifting too much weight or just by moving the wrong way. Back strain can be uncomfortable, even painful. And it can take weeks or months to improve. To help yourself feel better and prevent future back strains, try these tips.  Important Note: Do not give aspirin to children or teens without first discussing it with your healthcare  provider.      ? Ice    Ice reduces muscle pain and swelling. It helps most during the first 24 to 48 hours after an injury.  · Wrap an ice pack or a bag of frozen peas in a thin towel. (Never place ice directly on your skin.)  · Place the ice where your back hurts the most.  · Dont ice for more than 20 minutes at a time.  · You can use ice several times a day.  ? Medicines  Over-the-counter pain relievers can include acetaminophen and anti-inflammatory medicines, which includes aspirin or ibuprofen. They can help ease discomfort. Some also reduce swelling.  · Tell your healthcare provider about any medicines you are already taking.  · Take medicines only as directed.  ? Heat  After the first 48 hours, heat can relax sore muscles and improve blood flow.  · Try a warm bath or shower. Or use a heating pad set on low. To prevent a burn, keep a cloth between you and the heating pad.  · Dont use a heating pad for more than 15 minutes at a time. Never sleep on a heating pad.  Date Last Reviewed: 9/1/2015 © 2000-2017 [employer]. [address] [place] [address]. All rights reserved. This information is not intended as a substitute for professional medical care. Always follow your healthcare professional's instructions.        Possible Causes of Low Back or Leg Pain    The symptoms in your back or leg may be due to pressure on a nerve. This pressure may be caused by a damaged disk or by abnormal bone growth. Either way, you may feel pain, burning, tingling, or numbness. If you have pressure on a nerve that connects to the sciatic nerve, pain may shoot down your leg.    Pressure from the disk  Constant wear and tear can weaken a disk over time and cause back pain. The disk can then be damaged by a sudden movement or injury. If its soft center begins to bulge, the disk may press on a nerve. Or the outside of the disk may tear, and the soft center may squeeze through and pinch a nerve.    Pressure  "from bone  As a disk wears out, the vertebrae right above and below the disk begin to touch. This can put pressure on a nerve. Often, abnormal bone (called bone spurs) grows where the vertebrae rub against each other. This can cause the foramen or the spinal canal to narrow (called stenosis) and press against a nerve.  Date Last Reviewed: 10/4/2015  © 6177-7719 [employer]. [address] [place] [address], [place] [address]. All rights reserved. This information is not intended as a substitute for professional medical care. Always follow your healthcare professional's instructions.        Exercises to Prevent Falls  Certain types of exercises may help make you less likely to fall. Try the ones below. Or do other exercises that your health care provider suggests. Depending on your health, you may need to start slowly. Don't let that stop you. Even small amounts of exercise can help you. Be sure to talk to your health care provider before starting any exercise program.       Improve balance  Many types of exercise can help improve balance. Madi chi and yoga are good examples. Here's another one to try. You can do it anytime and almost anywhere.  · Stand next to a counter or solid support.  · Push yourself up onto your tiptoes.  · Hold for 5 seconds. If you start to lose your balance, hold on to the counter.  · Rest and repeat 5 times. Work up to holding for 20 to 30 seconds, if you can. Increase flexibility  Being more flexible makes it easier for you to move around safely. Try exercises like the seated hamstring stretch.  · Sit in a chair and put one foot on a stool.  · Straighten your leg and reach with both hands down either side of your leg. Reach as far down your leg as you can.  · Hold for about 20 seconds.  · Go back to the starting position. Then repeat 5 times. Switch legs. Build strength  "Resistance" exercises help build strength. You can do them without equipment. Or you can use weights, elastic bands, " or special machines. One such exercise is called the biceps curl. You can hold a 1-pound weight or even a can of soup. Do this exercise at least 3 times a week. Strive for every day.  · Sit up straight in a chair.  · Keep your elbow close to your body and your wrist straight.  · Bend your arm, moving your hand up to your shoulder. Then slowly lower your arm.  · Repeat 5 times. Switch to the other arm.   Build your staying power  Aerobic exercises make your heart and lungs stronger so you can keep moving longer. Walking and swimming are two of the best types of exercises you can do. Using a stationary bike is great, too. Find an aerobic exercise that you enjoy. Start slowly and build up. Even 5 minutes is helpful. Aim for a goal of 30 minutes, at least 3 times a week. You don't have to do 30 minutes in 1 session. Break it up and walk a little throughout the day.  More helpful tips  · Start easy. Slowly work up to doing more.  · Talk with your health care provider about the best exercises for you.  · Call senior centers or health clubs about exercise programs.  · If needed, have a family member watch you walk every so often to check your stability.  · Exercise with a friend. Choose an activity you both enjoy.  · Consider juana chi or yoga to strengthen your balance.  · Try exercises that you can do anytime, anywhere. Here are 2 examples. Have someone with you when you first try these:  ¨ Practice walking by placing 1 foot right in front of the other.  ¨ Stand up and sit down 10 times. Repeat this throughout the day.   Date Last Reviewed: 6/13/2015 © 2000-2017 [employer]. [address] [place] [address], [place] [address]. All rights reserved. This information is not intended as a substitute for professional medical care. Always follow your healthcare professional's instructions.

## 2020-02-20 ENCOUNTER — OFFICE VISIT (OUTPATIENT)
Dept: PAIN MEDICINE | Facility: CLINIC | Age: 65
End: 2020-02-20
Payer: COMMERCIAL

## 2020-02-20 VITALS
HEART RATE: 66 BPM | SYSTOLIC BLOOD PRESSURE: 140 MMHG | BODY MASS INDEX: 39.46 KG/M2 | DIASTOLIC BLOOD PRESSURE: 80 MMHG | HEIGHT: 68 IN | WEIGHT: 260.38 LBS

## 2020-02-20 DIAGNOSIS — M47.816 LUMBAR FACET ARTHROPATHY: ICD-10-CM

## 2020-02-20 DIAGNOSIS — M47.816 SPONDYLOSIS WITHOUT MYELOPATHY OR RADICULOPATHY, LUMBAR REGION: Primary | ICD-10-CM

## 2020-02-20 DIAGNOSIS — M47.26 OSTEOARTHRITIS OF SPINE WITH RADICULOPATHY, LUMBAR REGION: ICD-10-CM

## 2020-02-20 PROCEDURE — 99204 PR OFFICE/OUTPT VISIT, NEW, LEVL IV, 45-59 MIN: ICD-10-PCS | Mod: S$GLB,,, | Performed by: PHYSICAL MEDICINE & REHABILITATION

## 2020-02-20 PROCEDURE — 99204 OFFICE O/P NEW MOD 45 MIN: CPT | Mod: S$GLB,,, | Performed by: PHYSICAL MEDICINE & REHABILITATION

## 2020-02-20 PROCEDURE — 99999 PR PBB SHADOW E&M-EST. PATIENT-LVL IV: ICD-10-PCS | Mod: PBBFAC,,, | Performed by: PHYSICAL MEDICINE & REHABILITATION

## 2020-02-20 PROCEDURE — 99999 PR PBB SHADOW E&M-EST. PATIENT-LVL IV: CPT | Mod: PBBFAC,,, | Performed by: PHYSICAL MEDICINE & REHABILITATION

## 2020-02-20 NOTE — LETTER
February 20, 2020      Laila Jo MD  [address] [place] [address]           [facility]  [address] [place] [address]  Phone: [phone]  Fax: [phone]          Patient: Kate Lira   MR Number: [id number]   YOB: 1955   Date of Visit: 2/20/2020       Dear Dr. Laila Jo:    Thank you for referring Kate Lira to me for evaluation. Attached you will find relevant portions of my assessment and plan of care.    If you have questions, please do not hesitate to call me. I look forward to following Kate Lira along with you.    Sincerely,    Roger Bhandari MD    Enclosure  CC:  No Recipients    If you would like to receive this communication electronically, please contact [email or web address] or [phone] to request more information on [employer] Link access.    For providers and/or their staff who would like to refer a patient to [facility], please contact us through our one-stop-shop provider referral line, [facility] [place], at [phone].    If you feel you have received this communication in error or would no longer like to receive these types of communications, please e-mail [email or web address]

## 2020-02-20 NOTE — PROGRESS NOTES
New Patient Chronic Pain Note (Initial Visit)    Referring Physician: Laila Jo MD    PCP: Primary Doctor No    Chief Complaint:   Chief Complaint   Patient presents with    Back Pain        SUBJECTIVE:    Kate Lira is a 64 y.o. female who presents to the clinic for the evaluation of lower back pain. She was referred by the physiatry department for further evaluation and management of this pain. The pain started several years ago without any significant injury or trauma recently, but did have a fall in 2005 and symptoms have been worsening over the last few months.The pain is located in the lower lumbar area and radiates to the right posterior buttock.  The pain is described as aching, numbing and tingling and is rated as 8/10. The pain is rated with a score of  6/10 on the BEST day and a score of 10/10 on the WORST day.  Symptoms interfere with daily activity. The pain is exacerbated by walking, bending.  The pain is mitigated by heat. The patient reports spending 2-4 hours per day reclining. The patient reports 6-8 hours of uninterrupted sleep per night.    Patient denies night fever/night sweats, urinary incontinence, bowel incontinence, significant weight loss, significant motor weakness and loss of sensations.    Pain Disability Index Review:     Last 3 PDI Scores 2/20/2020   Pain Disability Index (PDI) 37       Non-Pharmacologic Treatments:  Physical Therapy/Home Exercise: yes  Ice/Heat:yes  TENS: yes  Acupuncture: no  Massage: yes  Chiropractic: no    Other: no      Pain Medications:  - Opioids: Norco  - Adjuvant Medications: Advil, Tylenol, meloxicam, Voltaren gel  - Anti-Coagulants: Aspirin     report:  Reviewed and consistent with medication use as prescribed.    Pain Procedures:   -previous lumbar epidural steroid injection 2-3 years ago      Imaging:   X-ray lumbar spine 02/18/2020:  There is mild levoscoliosis of the lumbar spine.  Vertebral body heights are within normal limits.   There is slight grade 1 anterolisthesis of L5 on S1.  No change in spinal alignment with flexion or extension to suggest instability.  Mild-to-moderate generalized disc height loss is noted throughout the lumbar spine with multilevel degenerative endplate spurring.  There is also multilevel bilateral facet arthropathy.  No pars defects visualized.  Posterior elements appear grossly intact. No fractures demonstrated.  The remaining visualized osseous and soft tissue structures demonstrate no appreciable abnormality.    Past Medical History:   Diagnosis Date    Arthritis     Asthma     Glaucoma     Hypertension      Past Surgical History:   Procedure Laterality Date    CHOLECYSTECTOMY      CORONARY ANGIOPLASTY  02/2015    KNEE SURGERY      left    TUBAL LIGATION       Social History     Socioeconomic History    Marital status: Single     Spouse name: Not on file    Number of children: 6    Years of education: Not on file    Highest education level: Not on file   Occupational History    Occupation: [employer]     Employer: [employer]   Social [place]    Financial resource strain: Not on file    Food insecurity:     Worry: Not on file     Inability: Not on file    Transportation needs:     Medical: Not on file     Non-medical: Not on file   Tobacco Use    Smoking status: Never Smoker    Smokeless tobacco: Never Used   Substance and Sexual Activity    Alcohol use: No    Drug use: No    Sexual activity: Yes     Partners: Male   Lifestyle    Physical activity:     Days per week: Not on file     Minutes per session: Not on file    Stress: Not on file   Relationships    Social connections:     Talks on phone: Not on file     Gets together: Not on file     Attends Sikhism service: Not on file     Active member of club or organization: Not on file     Attends meetings of clubs or organizations: Not on file     Relationship status: Not on file   Other Topics Concern    Not on file   Social History Narrative     Not on file     Family History   Problem Relation Age of Onset    Hypertension Mother     Heart disease Mother     Heart attack Mother 64        MI    Diabetes Father     Osteoarthritis Sister     Heart disease Sister     Cancer Sister         1  sister had cervical ca    Osteoarthritis Brother     Heart disease Brother        Review of patient's allergies indicates:   Allergen Reactions    Ace inhibitors      Throat swelling       Current Outpatient Medications   Medication Sig    albuterol (PROVENTIL HFA) 90 mcg/actuation inhaler Inhale 2 puffs into the lungs every 4 (four) hours as needed for Wheezing. 2 puffs two times daily    aspirin (ECOTRIN) 81 MG EC tablet Take 1 tablet (81 mg total) by mouth once daily.    atorvastatin (LIPITOR) 20 MG tablet Take 1 tablet (20 mg total) by mouth every evening.    brimonidine 0.2% (ALPHAGAN) 0.2 % Drop Place 1 drop into the left eye 3 (three) times daily.    diclofenac sodium (VOLTAREN) 1 % Gel Apply topically.    ergocalciferol (ERGOCALCIFEROL) 50,000 unit Cap Take 50,000 Units by mouth.    fluticasone furoate-vilanterol (BREO) 100-25 mcg/dose diskus inhaler Inhale 1 puff into the lungs.    hydrochlorothiazide (HYDRODIURIL) 25 MG tablet TAKE ONE TABLET BY MOUTH EVERY DAY    hydrOXYzine (VISTARIL) 100 MG capsule Take 1 capsule by mouth 2 (two) times daily as needed.    isosorbide mononitrate (IMDUR) 30 MG 24 hr tablet Take 1 tablet (30 mg total) by mouth once daily.    latanoprost 0.005 % ophthalmic solution Place 1 drop into both eyes every evening.    meloxicam (MOBIC) 15 MG tablet Take 15 mg by mouth.    metoprolol tartrate (LOPRESSOR) 25 MG tablet Take 1 tablet (25 mg total) by mouth 2 (two) times daily.    nitroGLYCERIN (NITROSTAT) 0.4 MG SL tablet Place 1 tablet (0.4 mg total) under the tongue every 5 (five) minutes as needed for Chest pain.    oxybutynin (DITROPAN XL) 15 MG TR24 Take 5 mg by mouth once daily.     No current  "facility-administered medications for this visit.        Review of Systems     GENERAL:  No weight loss, malaise or fevers.  HEENT:   No recent changes in vision or hearing  NECK:  Negative for lumps, no difficulty with swallowing.  RESPIRATORY:  Negative for cough, wheezing or shortness of breath, patient denies any recent URI.  CARDIOVASCULAR:  Negative for chest pain, leg swelling or palpitations.  GI:  Negative for abdominal discomfort, blood in stools or black stools or change in bowel habits.  MUSCULOSKELETAL:  See HPI.  SKIN:  Negative for lesions, rash, and itching.  PSYCH:  No mood disorder or recent psychosocial stressors.  Patients sleep is not disturbed secondary to pain.  HEMATOLOGY/LYMPHOLOGY:  Negative for prolonged bleeding, bruising easily or swollen nodes.  Patient is currently taking anti-coagulants - ASA  NEURO:   No history of headaches, syncope, paralysis, seizures or tremors.  All other reviewed and negative other than HPI.    OBJECTIVE:    BP (!) 140/80   Pulse 66   Ht 5' 8" (1.727 m)   Wt 118.1 kg (260 lb 5.8 oz)   BMI 39.59 kg/m²         Physical Exam    GENERAL: Well appearing, in no acute distress, alert and oriented x3.  Obese  PSYCH:  Mood and affect appropriate.  SKIN: Skin color, texture, turgor normal, no rashes or lesions.  HEAD/FACE:  Normocephalic, atraumatic. Cranial nerves grossly intact.  NECK: No pain to palpation over the cervical paraspinous muscles. Spurling Negative. No pain with neck flexion, extension, or lateral flexion.   CV: RRR with palpation of the radial artery.  PULM: No evidence of respiratory difficulty, symmetric chest rise.  GI:  Soft and non-tender.  BACK: Positive axial loading test bilateral. Positive tenderness over both SIJ, Positive FABERE,Ganselin and Yeoman's test on the both side. Negative FADIR  Straight leg raising in the sitting and supine positions is negative to radicular pain. Mild-to-moderate pain to palpation over the facet joints of the " lumbar spine, but not over the spinous processes.  Limited range of motion secondary to pain reproduction, mostly extension.  EXTREMITIES: Peripheral joint ROM is full and pain free without obvious instability or laxity in all four extremities. No deformities, edema, or skin discoloration. Good capillary refill.  MUSCULOSKELETAL: Shoulder, hip, and knee provocative maneuvers are negative.    Bilateral upper and lower extremity strength is normal and symmetric.  No atrophy or tone abnormalities are noted.  NEURO: Bilateral upper and lower extremity coordination and muscle stretch reflexes are physiologic and symmetric.  Plantar response are downgoing. No clonus.  No loss of sensation is noted.  GAIT:  Antalgic.      LABS:  Lab Results   Component Value Date    WBC 5.94 12/07/2016    HGB 13.6 12/07/2016    HCT 41.7 12/07/2016    MCV 94 12/07/2016     12/07/2016       CMP  Sodium   Date Value Ref Range Status   02/07/2017 138 136 - 145 mmol/L Final     Potassium   Date Value Ref Range Status   02/07/2017 4.1 3.5 - 5.1 mmol/L Final     Chloride   Date Value Ref Range Status   02/07/2017 105 95 - 110 mmol/L Final     CO2   Date Value Ref Range Status   02/07/2017 28 23 - 29 mmol/L Final     Glucose   Date Value Ref Range Status   02/07/2017 90 70 - 110 mg/dL Final     BUN, Bld   Date Value Ref Range Status   02/07/2017 21 8 - 23 mg/dL Final     Creatinine   Date Value Ref Range Status   02/07/2017 1.1 0.5 - 1.4 mg/dL Final     Calcium   Date Value Ref Range Status   02/07/2017 9.4 8.7 - 10.5 mg/dL Final     Total Protein   Date Value Ref Range Status   02/07/2017 7.2 6.0 - 8.4 g/dL Final     Albumin   Date Value Ref Range Status   02/07/2017 3.5 3.5 - 5.2 g/dL Final     Total Bilirubin   Date Value Ref Range Status   02/07/2017 0.5 0.1 - 1.0 mg/dL Final     Comment:     For infants and newborns, interpretation of results should be based  on gestational age, weight and in agreement with  clinical  observations.  Premature Infant recommended reference ranges:  Up to 24 hours.............<8.0 mg/dL  Up to 48 hours............<12.0 mg/dL  3-5 days..................<15.0 mg/dL  6-29 days.................<15.0 mg/dL       Alkaline Phosphatase   Date Value Ref Range Status   02/07/2017 71 55 - 135 U/L Final     AST   Date Value Ref Range Status   02/07/2017 21 10 - 40 U/L Final     ALT   Date Value Ref Range Status   02/07/2017 23 10 - 44 U/L Final     Anion Gap   Date Value Ref Range Status   02/07/2017 5 (L) 8 - 16 mmol/L Final     eGFR if    Date Value Ref Range Status   02/07/2017 >60.0 >60 mL/min/1.73 m^2 Final     eGFR if non    Date Value Ref Range Status   02/07/2017 53.9 (A) >60 mL/min/1.73 m^2 Final     Comment:     Calculation used to obtain the estimated glomerular filtration  rate (eGFR) is the CKD-EPI equation. Since race is unknown   in our information system, the eGFR values for   -American and Non--American patients are given   for each creatinine result.         Lab Results   Component Value Date    HGBA1C 6.3 (H) 07/29/2016             ASSESSMENT: 64 y.o. year old female with low back pain, consistent with     1. Spondylosis without myelopathy or radiculopathy, lumbar region  IR Facet Inj Lumbar 3rd Vert Bi    IR Facet Inj Lumbar 2nd Vert Bi    IR Facet Inj Lumbar 1st Vert Bi    Case Request-RAD/Other Procedure Area: Bilateral L3-5 MBB with local   2. Lumbar facet arthropathy  Ambulatory referral/consult to Pain Clinic   3. Osteoarthritis of spine with radiculopathy, lumbar region  Ambulatory referral/consult to Pain Clinic         PLAN:   - Interventions: Schedule for a diagnostic Medial branch block bilaterally at L3,4, and L5 to help with axial low back pain and progress with a home exercise program.. Explained the risks and benefits of the procedure in detail with the patient today in clinic along with alternative treatment options, and  the patient elected to pursue the intervention at this time.      - Anticoagulation use: yes aspirin    - If significant benefit with above procedure, consider Lumbar RFA at the same levels.     - Medications: I have stressed the importance of physical activity and a home exercise plan to help with pain and improve health. and Patient can continue with medications for now since they are providing benefits, using them appropriately, and without side effects.     - Therapy:  Continue with home exercises and activity as tolerated    - Labs:  Reviewed    - Imaging: Reviewed available imaging with patient and answered any questions they had regarding study.    - Consults/Referrals:  None at this time    - Records:  Reviewed/Obtain old records from outside physicians and imaging    - Follow up visit:  Will call following the diagnostic blocks to determine future plan of care    - Counseled patient regarding the importance of activity modification and physical therapy    - This condition does not require this patient to take time off of work, and the primary goal of our Pain Management services is to improve the patient's functional capacity.    - Patient Questions: Answered all of the patient's questions regarding diagnosis, therapy, and treatment        The above plan and management options were discussed at length with patient. Patient is in agreement with the above and verbalized understanding.    I discussed the goals of interventional chronic pain management with the patient on today's visit.  I explained the utility of injections for diagnostic and therapeutic purposes.  We discussed a multimodal approach to pain including treating the patient's given worst pain at any given time.  We will use a systematic approach to addressing pain.  We will also adopt a multimodal approach that includes injections, adjuvant medications, physical therapy, at times psychiatry.  There may be a limited role for opioid use  intermittently in the treatment of pain, more particularly for acute pain although no one approach can be used as a sole treatment modality.    I emphasized the importance of regular exercise, core strengthening and stretching, diet and weight loss as a cornerstone of long-term pain management.      Roger Bhandari MD  Interventional Pain Management  [facility]    Disclaimer:  This note was prepared using voice recognition system and is likely to have sound alike errors that may have been overlooked even after proof reading.  Please call me with any questions

## 2020-02-20 NOTE — PATIENT INSTRUCTIONS
- Schedule for a bilateral lumbar medial branch blocks at L3-5 for diagnostic purposes.  -if significant benefit with the diagnostic blocks, likely will proceed with lumbar radiofrequency ablation.  - Continue current medications as prescribed.  - Continue home based exercises and discussed the importance of a healthy and active lifestyle  - will call you the following day after the diagnostic blocks to determine future plan of care.    Please do not hesitate to contact the clinic [phone] if you have any questions regarding your treatment plan.     Roger Bhandari MD  Interventional Pain Medicine  [facility]         Pain Management Pre-Procedure Instructions  (also available in your MyChart account)    Patient Name:___Kate Lira____MRN: [id number] you are scheduled to have the following procedure:__ Lumbar Medial Branch Block  _with______Roger Bhandari MD on: ____03/09/2020___ at: [facility]    You will be contacted the day before your procedure to be given an arrival and procedure time                                                                                                            Day of Procedure   Ensure you have obtained arrival time from the Pain Management department  o We will call 48 hours in advance with your arrival time. Please check any voicemails you may have  o If you arrive past your scheduled procedure time, you may be asked to reschedule your procedure.   For your safety, ensure you have a  with you to remain present throughout your procedure   o If you arrive without a responsible adult to stay with you and drive you home, you may be asked to reschedule your procedure   Take all of your prescribed medications (exceptions noted below) with a small amount of water  o Do not have to stop meds  o [x] Nothing by mouth after midnight the night before your procedure.  It is ok to take your regular medications with a small sip of water.      Wear loose, comfortable clothing    You may wear glasses, dentures, contact lenses and/or hearing aids. Please leave all valuable items at home.   Contact the Pain Management department at [phone] or via [employer] if you are:  o Running a fever above 100 degrees  o Feel ill, have any type of infection, or are taking antibiotics now or have in the past 2 weeks  o Have had any outpatient procedures in the past 2 weeks (colonoscopy, major dental work, etc.)  o If you are allergic to iodine, IVP dye or shellfish.      Contact Information: [phone], ask to speak to the pain management department with any questions or concerns or send a message via [employer]

## 2020-02-28 NOTE — PRE ADMISSION SCREENING
Spoke with  Kate  regarding procedure scheduled on 3/9  Arrival time 0630  Has patient been sick with fever or on antibiotics within the last 7 days? no  Has patient received a vaccination within the last 7 days? no  Has the patient stopped all medications as directed? Na   Does patient have a pacemaker and or defibrillator? no  Does the patient have a ride to and from procedure and someone reliable to remain with patient? yes  Is the patient diabetic? no  Does the patient have sleep apnea? no Or use O2 at home? no  Is the patient receiving sedation? yes  Is the patient instructed to remain NPO beginning at midnight the night before their procedure? yes  Procedure location confirmed with patient? yes  Travel screening: done

## 2020-03-03 ENCOUNTER — TELEPHONE (OUTPATIENT)
Dept: PAIN MEDICINE | Facility: CLINIC | Age: 65
End: 2020-03-03

## 2020-03-03 NOTE — TELEPHONE ENCOUNTER
Cindy GONZÁLES [facility] Staff             Good Morning,     This message is to inform your office that the request for Ms Lee was   Denied by her secondary insurance. Peer to peer is offered by calling [phone]   Ext [phone]. She is approved with her primary insurance insurance. Please   contact me once completed.     Thank You   Cindy Edwardo BALDWIN    Auth/Cert   Auth/Cert # [id number]   Auth/Cert # [id number]      Creation Date/Time Creation Department Service Area Created By   2/27/2020  2:23 PM [facility] SERV[facility] ARE[facility] CINDY ABREU   Visit Counts     Requested Visits Authorized Visits Scheduled Visits Completed Visits   1 1       HUMANA / [employer]NA  PPO     Authorization/Certification Information     Pre-cert Required? Pre-cert Status Pre-cert Agency Phone Contact   No Not Needed Outpatient         Fax Date Called Pre-cert Number Auth Number Authorized From           3/9/2020   Authorized To Date Received Receiving User   3/9/2020 3/3/2020 CINDY ABREU   Benefit Information     Check Required? Check Status Checked With? Phone             Contact Fax Date Checked User             Coinsurance Deductible Clinical Contact Clinical Phone             MEDICAID / [employer] [place]     Authorization/Certification Information     Pre-cert Required? Pre-cert Status Pre-cert Agency Phone Contact   Yes Pending- Peer to Peer   [phone]     Fax Date Called Pre-cert Number Auth Number Authorized From   [phone] 2/27/2020     3/9/2020   Authorized To Date Received Receiving User   3/9/2020 3/3/2020 CINDY ABREU   Comments     EXT [id number]   Benefit Information     Check Required? Check Status Checked With? Phone             Contact Fax Date Checked User             Coinsurance Deductible Clinical Contact Clinical Phone             Other Bed Days     Day Type Start Date End Date Estimated Override Raw Converted Reason Comment   None           Procedure Information      Procedure Details   Procedure Modifiers Provider Requested Approved   90349 (CPT®) - CO INJ DX/THER AGNT PARAVERT FACET JOINT,IMG GUIDE,LUMBAR/SAC,1ST LVL None  1 1   96540 (CPT®) - CO INJ DX/THER AGNT PARAVERT FACET JOINT,IMG GUIDE,LUMBAR/SAC, 2ND LEVEL None  1 1   01192 (CPT®) - CO INJ DX/THER AGNT PARAVERT FACET JOINT,IMG GUIDE,LUMBAR/SAC, ADD LEVEL None  1 1   Free Text Procedure Description    Bilateral L3-5 MBB with local            Diagnosis Information     Diagnosis   M47.816 (ICD-10-CM) - Spondylosis without myelopathy or radiculopathy, lumbar region   Diagnosis Description   Spondylosis without myelopathy or radiculopathy, lumbar region [M47.816]   Referral Notes   Number of Notes: 2   Type Date User Summary Attachment   Authorization Information 2020  7:58 AM Cindy Brooke - -   Note    REQUEST WAS DENIED  BY [employer] MEDICAL DIRECTOR  DR GARCIA OFFICE NOTIFIED  IN BASKET MESSAGE SENT     Good Morning,     This message is to inform your office that the request for Ms Lee was   Denied by her secondary insurance. Peer to peer is offered by calling [phone]  Ext [phone]. She is approved with her primary insurance insurance. Please   contact me once completed.     Thank You  Cindy Brooke LPN               Type Date User Summary Attachment   Authorization Information 2020  3:22 PM Cindy Brooke - -   Note    SUBMITTED SINDY CORREA VIA FAX     OP SURGERY REQUEST  PAIN MANAGEMENT    PLEASE EXPEDITE  RAMANA HIGGINS   1955  DOS 2020  DR PASCALE GARCIA   NPI  [id number]  [facility]   NPI [id number]  DX CODE M47.816  CPT CODES 27076, 09279, 83630  L3-4, L4-5 GABRIELA  FAX # [phone]

## 2020-03-05 ENCOUNTER — PATIENT MESSAGE (OUTPATIENT)
Dept: PAIN MEDICINE | Facility: CLINIC | Age: 65
End: 2020-03-05

## 2020-03-05 ENCOUNTER — TELEPHONE (OUTPATIENT)
Dept: PAIN MEDICINE | Facility: CLINIC | Age: 65
End: 2020-03-05

## 2020-03-05 NOTE — TELEPHONE ENCOUNTER
NICOLASA Chin Roger Staff             Good Morning,     Status: Denial stands due to reason:   Non covered benefit, patient has Behavioral Health Benefits only through her [facility] Insurance policy.     Ashleigh    Auth/Cert   Auth/Cert # [id number]   Auth/Cert # [id number]      Creation Date/Time Creation Department Service Area Created By   2/27/2020  2:23 PM [facility] ALISE ABREU   Visit Counts     Requested Visits Authorized Visits Scheduled Visits Completed Visits   1 1       HUMANA / [facility]MANA  PPO     Authorization/Certification Information     Pre-cert Required? Pre-cert Status Pre-cert Agency Phone Contact   No Not Needed Outpatient         Fax Date Called Pre-cert Number Auth Number Authorized From           3/9/2020   Authorized To Date Received Receiving User   3/9/2020 3/3/2020 ALISE ABREU   Benefit Information     Check Required? Check Status Checked With? Phone             Contact Fax Date Checked User             Coinsurance Deductible Clinical Contact Clinical Phone             [employer] / [employer] [place]     Authorization/Certification Information     Pre-cert Required? Pre-cert Status Pre-cert Agency Phone Contact   Yes Denied   [phone]     Fax Date Called Pre-cert Number Auth Number Authorized From   [phone] 2/27/2020     3/9/2020   Authorized To Date Received Receiving User   3/9/2020 3/5/2020 ASHLEIGH URBAN   Comments     Non covered benefit - Behavioral Health coverage only   Benefit Information     Check Required? Check Status Checked With? Phone             Contact Fax Date Checked User             Coinsurance Deductible Clinical Contact Clinical Phone             Other Bed Days     Day Type Start Date End Date Estimated Override Raw Converted Reason Comment   None           Procedure Information     Procedure Details   Procedure Modifiers Provider Requested Approved   19895 (CPT®) - [place] INJ DX/THER AGNT PARAVERT FACET  JOINT,IMG GUIDE,LUMBAR/SAC,1ST LVL None  1 1   17125 (CPT®) - [place] INJ DX/THER AGNT PARAVERT FACET JOINT,IMG GUIDE,LUMBAR/SAC, 2ND LEVEL None  1 1   03907 (CPT®) - [place] INJ DX/THER AGNT PARAVERT FACET JOINT,IMG GUIDE,LUMBAR/SAC, ADD LEVEL None  1 1   Free Text Procedure Description    Bilateral L3-5 MBB with local            Diagnosis Information     Diagnosis   M47.816 (ICD-10-CM) - Spondylosis without myelopathy or radiculopathy, lumbar region   Diagnosis Description   Spondylosis without myelopathy or radiculopathy, lumbar region [M47.816]   Referral Notes   Number of Notes: 3   Type Date User Summary Attachment   General 03/05/2020  9:39 AM Ashleigh Medina LPN - -   Note    Called [employer], spoke with Casa/rep for status of peer to peer.  Per Casa, a peer to peer was requested, but denial stands due to reason:  Non covered benefit, patient has Behavioral Health Benefits only through this policy.  Call ref # [id number]     In basket message sent to Dr. Garcia staff        Denied   Received: Today   Message Contents       Ashleigh Medina LPN  P Thony Duran Staff               Good Morning,     Status: Denial stands due to reason:   Non covered benefit, patient has Behavioral Health Benefits only through her [employer] Insurance policy.     Ashleigh                     Type Date User Summary Attachment   Authorization Information 03/03/2020  7:58 AM Cindy Brooke - -   Note    REQUEST WAS DENIED  BY [employer] MEDICAL DIRECTOR  DR GARCIA OFFICE NOTIFIED  IN BASKET MESSAGE SENT     Good Morning,     This message is to inform your office that the request for Ms Lee was   Denied by her secondary insurance. Peer to peer is offered by calling [phone]  Ext [phone]. She is approved with her primary insurance insurance. Please   contact me once completed.     Thank You  Cindy Brooke LPN               Type Date User Summary Attachment   Authorization Information 02/27/2020  3:22 PM Cindy Brooke - -   Note     SUBMITTED [employer] VIA FAX     OP SURGERY REQUEST  PAIN MANAGEMENT    PLEASE EXPEDITE  RAMANA HIGGINS   1955  DOS 2020  DR PASCALE GARCIA   NPI  [id number]  [facility]   NPI [id number]  DX CODE M47.816  CPT CODES 24457, 99826, 40801  L3-4, L4-5 GABRIELA  FAX # [phone]

## 2020-03-05 NOTE — TELEPHONE ENCOUNTER
----- Message from Denice Beltran sent at 3/5/2020 11:17 AM CST -----  Contact: pt  Pt called to cancel procedure until after she can see if she is approved for financial services and can be reached at [phone]        Thanks,  Denice Beltran

## 2020-03-05 NOTE — TELEPHONE ENCOUNTER
Contacted pt and informed pt insurance denied the procedure . Phone number for financial assistance was given to get an estimate for procedure . All questions answered and pt verbalized understanding.

## 2020-03-05 NOTE — TELEPHONE ENCOUNTER
Canceled procedure and f/u until pt can pay for procedure out of pocket due to pt insurance denying her procedure .

## 2020-03-20 DIAGNOSIS — H40.1123 PRIMARY OPEN ANGLE GLAUCOMA (POAG) OF LEFT EYE, SEVERE STAGE: ICD-10-CM

## 2020-03-20 RX ORDER — BRIMONIDINE TARTRATE 2 MG/ML
SOLUTION/ DROPS OPHTHALMIC
Qty: 15 ML | Refills: 4 | Status: SHIPPED | OUTPATIENT
Start: 2020-03-20 | End: 2022-07-29 | Stop reason: SDUPTHER

## 2020-04-27 RX ORDER — LATANOPROST 50 UG/ML
SOLUTION/ DROPS OPHTHALMIC
Qty: 2.5 ML | Refills: 11 | Status: SHIPPED | OUTPATIENT
Start: 2020-04-27 | End: 2022-07-26 | Stop reason: SDUPTHER

## 2020-04-28 ENCOUNTER — TELEPHONE (OUTPATIENT)
Dept: PAIN MEDICINE | Facility: CLINIC | Age: 65
End: 2020-04-28

## 2020-04-28 NOTE — TELEPHONE ENCOUNTER
----- Message from Natalia Mcgregor sent at 4/28/2020 11:57 AM CDT -----  Contact: Pt   Pt called in regards to getting her injection sent to the wrong location. Caller would like her injection to be sent to [place]. Pt can be reached at [phone] ([email or web address]).

## 2020-04-28 NOTE — TELEPHONE ENCOUNTER
Pt ask for when we r/s procedure to bill it to [employer]. Informed we have file of that insurance and we will  make sure when we schedule it bills to right insurance  and not [employer] . All questions answered.

## 2020-05-12 ENCOUNTER — TELEPHONE (OUTPATIENT)
Dept: PAIN MEDICINE | Facility: CLINIC | Age: 65
End: 2020-05-12

## 2020-05-12 NOTE — TELEPHONE ENCOUNTER
Contacted pt. Appt for procedure scheduled may 25,2020 with . Pt is not having iv sedation, so no covid test required. Made f/u in clinic June 05 with  to comply with 10-14 day f/u. Pt declined over the phone. Made with , due to  being out in that time frame. Went over instructions with pt and pt verbalized understanding.Instructions also mailed to pt.  All questions answered.

## 2020-05-19 DIAGNOSIS — M47.816 SPONDYLOSIS WITHOUT MYELOPATHY OR RADICULOPATHY, LUMBAR REGION: Primary | ICD-10-CM

## 2020-05-19 DIAGNOSIS — Z03.818 ENCOUNTER FOR OBSERVATION FOR SUSPECTED EXPOSURE TO OTHER BIOLOGICAL AGENTS RULED OUT: ICD-10-CM

## 2020-05-23 ENCOUNTER — LAB VISIT (OUTPATIENT)
Dept: OTOLARYNGOLOGY | Facility: CLINIC | Age: 65
End: 2020-05-23
Payer: COMMERCIAL

## 2020-05-23 DIAGNOSIS — Z03.818 ENCOUNTER FOR OBSERVATION FOR SUSPECTED EXPOSURE TO OTHER BIOLOGICAL AGENTS RULED OUT: ICD-10-CM

## 2020-05-23 PROCEDURE — U0003 INFECTIOUS AGENT DETECTION BY NUCLEIC ACID (DNA OR RNA); SEVERE ACUTE RESPIRATORY SYNDROME CORONAVIRUS 2 (SARS-COV-2) (CORONAVIRUS DISEASE [COVID-19]), AMPLIFIED PROBE TECHNIQUE, MAKING USE OF HIGH THROUGHPUT TECHNOLOGIES AS DESCRIBED BY CMS-2020-01-R: HCPCS

## 2020-05-24 LAB — SARS-COV-2 RNA RESP QL NAA+PROBE: NOT DETECTED

## 2020-05-25 ENCOUNTER — HOSPITAL ENCOUNTER (OUTPATIENT)
Facility: HOSPITAL | Age: 65
Discharge: HOME | End: 2020-05-25
Attending: PHYSICAL MEDICINE & REHABILITATION | Admitting: PHYSICAL MEDICINE & REHABILITATION
Payer: COMMERCIAL

## 2020-05-25 VITALS
DIASTOLIC BLOOD PRESSURE: 86 MMHG | HEIGHT: 68 IN | RESPIRATION RATE: 16 BRPM | OXYGEN SATURATION: 100 % | SYSTOLIC BLOOD PRESSURE: 189 MMHG | WEIGHT: 255.06 LBS | HEART RATE: 51 BPM | BODY MASS INDEX: 38.65 KG/M2 | TEMPERATURE: 98 F

## 2020-05-25 DIAGNOSIS — M47.816 SPONDYLOSIS WITHOUT MYELOPATHY OR RADICULOPATHY, LUMBAR REGION: ICD-10-CM

## 2020-05-25 PROCEDURE — 63600175 PHARM REV CODE 636 W HCPCS: Performed by: PHYSICAL MEDICINE & REHABILITATION

## 2020-05-25 PROCEDURE — 64494 INJ PARAVERT F JNT L/S 2 LEV: CPT | Mod: 50,,, | Performed by: PHYSICAL MEDICINE & REHABILITATION

## 2020-05-25 PROCEDURE — 64493 PR INJ DX/THER AGNT PARAVERT FACET JOINT,IMG GUIDE,LUMBAR/SAC,1ST LVL: ICD-10-PCS | Mod: 50,,, | Performed by: PHYSICAL MEDICINE & REHABILITATION

## 2020-05-25 PROCEDURE — 64493 INJ PARAVERT F JNT L/S 1 LEV: CPT | Mod: 50 | Performed by: PHYSICAL MEDICINE & REHABILITATION

## 2020-05-25 PROCEDURE — 25000003 PHARM REV CODE 250: Performed by: PHYSICAL MEDICINE & REHABILITATION

## 2020-05-25 PROCEDURE — 99152 MOD SED SAME PHYS/QHP 5/>YRS: CPT | Performed by: PHYSICAL MEDICINE & REHABILITATION

## 2020-05-25 PROCEDURE — 64495 INJ PARAVERT F JNT L/S 3 LEV: CPT | Mod: 50 | Performed by: PHYSICAL MEDICINE & REHABILITATION

## 2020-05-25 PROCEDURE — 64494 INJ PARAVERT F JNT L/S 2 LEV: CPT | Mod: 50 | Performed by: PHYSICAL MEDICINE & REHABILITATION

## 2020-05-25 PROCEDURE — 64494 PR INJ DX/THER AGNT PARAVERT FACET JOINT,IMG GUIDE,LUMBAR/SAC, 2ND LEVEL: ICD-10-PCS | Mod: 50,,, | Performed by: PHYSICAL MEDICINE & REHABILITATION

## 2020-05-25 PROCEDURE — S0020 INJECTION, BUPIVICAINE HYDRO: HCPCS | Performed by: PHYSICAL MEDICINE & REHABILITATION

## 2020-05-25 PROCEDURE — 64493 INJ PARAVERT F JNT L/S 1 LEV: CPT | Mod: 50,,, | Performed by: PHYSICAL MEDICINE & REHABILITATION

## 2020-05-25 RX ORDER — BUPIVACAINE HYDROCHLORIDE 5 MG/ML
INJECTION, SOLUTION EPIDURAL; INTRACAUDAL
Status: DISCONTINUED | OUTPATIENT
Start: 2020-05-25 | End: 2020-05-25 | Stop reason: HOSPADM

## 2020-05-25 RX ORDER — ONDANSETRON 2 MG/ML
4 INJECTION INTRAMUSCULAR; INTRAVENOUS ONCE AS NEEDED
Status: DISCONTINUED | OUTPATIENT
Start: 2020-05-25 | End: 2020-05-25 | Stop reason: HOSPADM

## 2020-05-25 RX ORDER — DEXTROMETHORPHAN HYDROBROMIDE, GUAIFENESIN 5; 100 MG/5ML; MG/5ML
650 LIQUID ORAL EVERY 8 HOURS
COMMUNITY

## 2020-05-25 RX ORDER — MIDAZOLAM HYDROCHLORIDE 1 MG/ML
INJECTION, SOLUTION INTRAMUSCULAR; INTRAVENOUS
Status: DISCONTINUED | OUTPATIENT
Start: 2020-05-25 | End: 2020-05-25 | Stop reason: HOSPADM

## 2020-05-25 RX ORDER — FENTANYL CITRATE 50 UG/ML
INJECTION, SOLUTION INTRAMUSCULAR; INTRAVENOUS
Status: DISCONTINUED | OUTPATIENT
Start: 2020-05-25 | End: 2020-05-25 | Stop reason: HOSPADM

## 2020-05-25 NOTE — PLAN OF CARE
Dr. Bhandari notified pt's BP is 185/92 and pt. Took nitroglycerin 2 months ago due to chest pain. No new orders, ok to proceed with procedure per Dr. Bhandari

## 2020-05-25 NOTE — DISCHARGE INSTRUCTIONS

## 2020-05-25 NOTE — DISCHARGE SUMMARY
Discharge Note  Short Stay      SUMMARY     Admit Date: 5/25/2020    Attending Physician: Roger Bhandari MD        Discharge Physician: Roger Bhandari MD        Discharge Date: 5/25/2020 10:18 AM    Procedure(s) (LRB):  Bilateral L3-5 MBB with local (Bilateral)    Final Diagnosis: Spondylosis without myelopathy or radiculopathy, lumbar region [M47.816]    Disposition: Home or self care    Patient Instructions:   Current Discharge Medication List      CONTINUE these medications which have NOT CHANGED    Details   acetaminophen (TYLENOL) 650 MG TbSR Take 650 mg by mouth every 8 (eight) hours.      albuterol (PROVENTIL HFA) 90 mcg/actuation inhaler Inhale 2 puffs into the lungs every 4 (four) hours as needed for Wheezing. 2 puffs two times daily  Qty: 18 g, Refills: 2    Associated Diagnoses: Asthma, mild intermittent, uncomplicated      aspirin (ECOTRIN) 81 MG EC tablet Take 1 tablet (81 mg total) by mouth once daily.  Qty: 30 tablet, Refills: 0    Associated Diagnoses: Chest pain      atorvastatin (LIPITOR) 20 MG tablet Take 1 tablet (20 mg total) by mouth every evening.  Qty: 30 tablet, Refills: 11    Associated Diagnoses: Hyperlipidemia, unspecified hyperlipidemia type      brimonidine 0.2% (ALPHAGAN) 0.2 % Drop INSTILL 1 DROP IN LEFT EYE THREE TIMES DAILY  Qty: 15 mL, Refills: 4    Associated Diagnoses: Primary open angle glaucoma (POAG) of left eye, severe stage      diclofenac sodium (VOLTAREN) 1 % Gel Apply topically.      ergocalciferol (ERGOCALCIFEROL) 50,000 unit Cap Take 50,000 Units by mouth.      fluticasone furoate-vilanterol (BREO) 100-25 mcg/dose diskus inhaler Inhale 1 puff into the lungs.      hydrochlorothiazide (HYDRODIURIL) 25 MG tablet TAKE ONE TABLET BY MOUTH EVERY DAY  Qty: 30 tablet, Refills: 0      isosorbide mononitrate (IMDUR) 30 MG 24 hr tablet Take 1 tablet (30 mg total) by mouth once daily.  Qty: 30 tablet, Refills: 6    Associated Diagnoses: Chest pain, unspecified type       latanoprost 0.005 % ophthalmic solution INSTILL 1 DROP IN BOTH EYES EVERY EVENING  Qty: 2.5 mL, Refills: 11      metoprolol tartrate (LOPRESSOR) 25 MG tablet Take 1 tablet (25 mg total) by mouth 2 (two) times daily.  Qty: 60 tablet, Refills: 6    Associated Diagnoses: Essential hypertension      oxybutynin (DITROPAN XL) 15 MG TR24 Take 5 mg by mouth once daily.      hydrOXYzine (VISTARIL) 100 MG capsule Take 1 capsule by mouth 2 (two) times daily as needed.      meloxicam (MOBIC) 15 MG tablet Take 15 mg by mouth.      nitroGLYCERIN (NITROSTAT) 0.4 MG SL tablet Place 1 tablet (0.4 mg total) under the tongue every 5 (five) minutes as needed for Chest pain.  Qty: 30 tablet, Refills: 6    Associated Diagnoses: Chest pain, unspecified type                 Discharge Diagnosis: Spondylosis without myelopathy or radiculopathy, lumbar region [M47.816]  Condition on Discharge: Stable with no complications to procedure   Diet on Discharge: Same as before.  Activity: as per instruction sheet.  Discharge to: Home with a responsible adult.  Follow up: 2-4 weeks       Please call the office if you experience any weakness or loss of sensation, fever > 101.5, pain uncontrolled with oral medications, persistent nausea/vomiting/or diarrhea, redness or drainage from the incisions, or any other worrisome concerns. If physician on call was not reached or could not communicate with our office for any reason please go to the nearest emergency department

## 2020-05-25 NOTE — PLAN OF CARE
Patient awake, alert, able to dress herself safely. No distress noted. 4 bandaids, 2 small, 2 large, clean, dry, intact to lower back.

## 2020-05-25 NOTE — OP NOTE
LUMBAR Medial Branch Block Under Fluoroscopy  Time-out taken to identify patient and procedure side prior to starting the procedure.            05/25/2020                                                         PROCEDURE:  Bilateral medial branch block at the transverse processes at the level of L4, L5, Sacral ala    REASON FOR PROCEDURE: Spondylosis without myelopathy or radiculopathy, lumbar region [M47.816]    PHYSICIAN: Roger Bhandari MD  ASSISTANTS: None    SEDATION MEDICATIONS: Conscious sedation provided by M.D    The patient was monitored with continuous pulse oximetry, EKG, and intermittent blood pressure monitors.  The patient was hemodynamically stable throughout the entire process was responsive to voice, and breathing spontaneously.  Supplemental O2 was provided at 2L/min via nasal cannula.  Patient was comfortable for the duration of the procedure. (See nurse documentation and case log for sedation time)    There was a total of 1mg IV Midazolam and 25mcg Fentanyl titrated for the procedure      MEDICATIONS INJECTED: 0.5% bupivicane, 1mL at each level    LOCAL ANESTHETIC USED: Xylocaine 1% 10ml    ESTIMATED BLOOD LOSS:  None.    COMPLICATIONS:  None.    TECHNIQUE: Laying in a prone position, the patient was prepped and draped in the usual sterile fashion using ChloraPrep and fenestrated drape.  The level was determined under fluoroscopic guidance.  Local anesthetic was given by going down to the hub of the 27-gauge 1.25in needle and raising a wheel.  A 22-gauge 3.5inch needle was introduced to the anatomic local of the medial branch at each of the above levels using fluoroscopy in the AP, oblique, and lateral views.  After negative aspiration, medication was injected slowly. The patient tolerated the procedure well.       The patient was monitored after the procedure.  Patient was given post procedure and discharge instructions to follow at home.  We will see the patient back in two weeks or the  patient may call to inform of status. The patient was discharged in a stable condition

## 2020-05-25 NOTE — H&P
"HPI  Patient presenting for Procedure(s) (LRB):  Bilateral L3-5 MBB with local (Bilateral)     Patient on Anti-coagulation Yes, ASA    No health changes since previous encounter    Past Medical History:   Diagnosis Date    Arthritis     Asthma     Glaucoma     Hypertension      Past Surgical History:   Procedure Laterality Date    CHOLECYSTECTOMY      CORONARY ANGIOPLASTY  02/2015    KNEE SURGERY      left    TUBAL LIGATION       Review of patient's allergies indicates:   Allergen Reactions    Ace inhibitors      Throat swelling      Current Facility-Administered Medications   Medication    ondansetron injection 4 mg       PMHx, PSHx, Allergies, Medications reviewed in epic    ROS negative except pain complaints in HPI    OBJECTIVE:    BP (!) 185/92 (BP Location: Right arm, Patient Position: Sitting)   Pulse 65   Temp 98.2 °F (36.8 °C) (Temporal)   Resp 16   Ht 5' 8" (1.727 m)   Wt 115.7 kg (255 lb 1.2 oz)   SpO2 97%   Breastfeeding? No   BMI 38.78 kg/m²     PHYSICAL EXAMINATION:    GENERAL: Well appearing, in no acute distress, alert and oriented x3.  PSYCH:  Mood and affect appropriate.  SKIN: Skin color, texture, turgor normal, no rashes or lesions which will impact the procedure.  CV: RRR with palpation of the radial artery.  PULM: No evidence of respiratory difficulty, symmetric chest rise. Clear to auscultation.  NEURO: Cranial nerves grossly intact.    Plan:    Proceed with procedure as planned Procedure(s) (LRB):  Bilateral L3-5 MBB with local (Bilateral)    Roger Bhandari MD  05/25/2020            "

## 2020-05-26 ENCOUNTER — TELEPHONE (OUTPATIENT)
Dept: PAIN MEDICINE | Facility: CLINIC | Age: 65
End: 2020-05-26

## 2020-05-26 NOTE — TELEPHONE ENCOUNTER
Contacted patient to check relief of diagnostic injection with Dr. velasco on 05/25/2020. Patient reports 80% relief from injection. With a 3/10 pain level .  Will notify Dr. Velasco of patient's relief.

## 2020-06-01 ENCOUNTER — TELEPHONE (OUTPATIENT)
Dept: PAIN MEDICINE | Facility: CLINIC | Age: 65
End: 2020-06-01

## 2020-06-01 NOTE — TELEPHONE ENCOUNTER
Moved 06/23 to 06/25. Pt understood. All questions answered.   Alexis Boyd,MA [facility]

## 2020-06-07 ENCOUNTER — NURSE TRIAGE (OUTPATIENT)
Dept: ADMINISTRATIVE | Facility: CLINIC | Age: 65
End: 2020-06-07

## 2020-06-07 NOTE — TELEPHONE ENCOUNTER
Pt contacted through the Post Procedural Symptom Tracker. No answer. No additional contact today as per post procedure protocol day 13. Triage nurse will attempt call back tomorrow.       Reason for Disposition   No answer.  First attempt to contact caller.  Follow-up call scheduled within 15 minutes.    Additional Information   Negative: Caller is angry or rude (e.g., hangs up, verbally abusive, yelling)   Negative: Caller hangs up   Negative: Caller has already spoken with the PCP and has no further questions.   Negative: Caller has already spoken with another triager and has no further questions.   Negative: Caller has already spoken with another triager or PCP AND has further questions AND triager able to answer questions.   Negative: Busy signal.  First attempt to contact caller.  Follow-up call scheduled within 15 minutes.    Protocols used: NO CONTACT OR DUPLICATE CONTACT CALL-A-

## 2020-06-08 ENCOUNTER — OFFICE VISIT (OUTPATIENT)
Dept: OPHTHALMOLOGY | Facility: CLINIC | Age: 65
End: 2020-06-08
Payer: COMMERCIAL

## 2020-06-08 DIAGNOSIS — H40.1123 PRIMARY OPEN ANGLE GLAUCOMA (POAG) OF LEFT EYE, SEVERE STAGE: Primary | ICD-10-CM

## 2020-06-08 DIAGNOSIS — H40.1111 PRIMARY OPEN ANGLE GLAUCOMA (POAG) OF RIGHT EYE, MILD STAGE: ICD-10-CM

## 2020-06-08 PROCEDURE — 99999 PR PBB SHADOW E&M-EST. PATIENT-LVL I: CPT | Mod: PBBFAC,,, | Performed by: OPTOMETRIST

## 2020-06-08 PROCEDURE — 92012 INTRM OPH EXAM EST PATIENT: CPT | Mod: S$GLB,,, | Performed by: OPTOMETRIST

## 2020-06-08 PROCEDURE — 99999 PR PBB SHADOW E&M-EST. PATIENT-LVL I: ICD-10-PCS | Mod: PBBFAC,,, | Performed by: OPTOMETRIST

## 2020-06-08 PROCEDURE — 92012 PR EYE EXAM, EST PATIENT,INTERMED: ICD-10-PCS | Mod: S$GLB,,, | Performed by: OPTOMETRIST

## 2020-06-08 RX ORDER — BETAXOLOL HYDROCHLORIDE 5 MG/ML
1 SOLUTION/ DROPS OPHTHALMIC 2 TIMES DAILY
Qty: 10 ML | Refills: 4 | Status: SHIPPED | OUTPATIENT
Start: 2020-06-08 | End: 2022-01-21 | Stop reason: SDUPTHER

## 2020-06-08 NOTE — TELEPHONE ENCOUNTER
Pt contacted through the Post Procedural Symptom Tracker. No answer. No additional contact today as per post procedure protocol. [id number] SYMPTOMS      Reason for Disposition   No answer.  First attempt to contact caller.  Follow-up call scheduled within 15 minutes.   Second attempt to contact family AND no contact made. Phone number verified.    Protocols used: NO CONTACT OR DUPLICATE CONTACT CALL-A-[place]

## 2020-06-08 NOTE — PROGRESS NOTES
HPI     PT was last seen on 2/10/2020 with DNL for IOP check. PT was told to RTC  3-4 months for IOP check.  Medication eye drops if any: Latanoprost qhs OU and Brimonidine tid OS  Last HVF: 12/30/19  Last gOCT: 12/11/19  Last SDPs:12/30/19    1. POAG OU  2. NSC OU        Last edited by Ashley Beltran, PCT on 6/8/2020  8:32 AM. (History)            Assessment /Plan     For exam results, see Encounter Report.    Primary open angle glaucoma (POAG) of left eye, severe stage    Primary open angle glaucoma (POAG) of right eye, mild stage    Other orders  -     betaxolol 0.5% (BETOPTIC-S) 0.5 % Drop; Place 1 drop into the left eye 2 (two) times daily.  Dispense: 10 mL; Refill: 4      IOP elevated OS compared to goal  Stable OD  Great compliance    Continue latanoprost qhs OU, brimonidine bid OS  Add betaxolol bid OS    RTC 1-2 months for IOP check or PRN  Discussed above and all questions were answered.

## 2020-06-11 ENCOUNTER — TELEPHONE (OUTPATIENT)
Dept: PAIN MEDICINE | Facility: CLINIC | Age: 65
End: 2020-06-11

## 2020-06-11 NOTE — TELEPHONE ENCOUNTER
Good afternoon,  Dr. Bhandari would like to perform a lumbar RFA , but patient is on aspirin and would need to hold all asa products 7 days prior, along with any other blood thinners. Please advise.

## 2020-07-09 ENCOUNTER — TELEPHONE (OUTPATIENT)
Dept: PAIN MEDICINE | Facility: CLINIC | Age: 65
End: 2020-07-09

## 2020-07-09 ENCOUNTER — OFFICE VISIT (OUTPATIENT)
Dept: PAIN MEDICINE | Facility: CLINIC | Age: 65
End: 2020-07-09
Payer: COMMERCIAL

## 2020-07-09 VITALS
WEIGHT: 256.38 LBS | HEIGHT: 68 IN | DIASTOLIC BLOOD PRESSURE: 71 MMHG | SYSTOLIC BLOOD PRESSURE: 131 MMHG | BODY MASS INDEX: 38.86 KG/M2 | HEART RATE: 73 BPM

## 2020-07-09 DIAGNOSIS — M47.816 SPONDYLOSIS WITHOUT MYELOPATHY OR RADICULOPATHY, LUMBAR REGION: Primary | ICD-10-CM

## 2020-07-09 DIAGNOSIS — M47.26 OSTEOARTHRITIS OF SPINE WITH RADICULOPATHY, LUMBAR REGION: ICD-10-CM

## 2020-07-09 PROCEDURE — 3078F DIAST BP <80 MM HG: CPT | Mod: CPTII,S$GLB,, | Performed by: PHYSICAL MEDICINE & REHABILITATION

## 2020-07-09 PROCEDURE — 3008F BODY MASS INDEX DOCD: CPT | Mod: CPTII,S$GLB,, | Performed by: PHYSICAL MEDICINE & REHABILITATION

## 2020-07-09 PROCEDURE — 3078F PR MOST RECENT DIASTOLIC BLOOD PRESSURE < 80 MM HG: ICD-10-PCS | Mod: CPTII,S$GLB,, | Performed by: PHYSICAL MEDICINE & REHABILITATION

## 2020-07-09 PROCEDURE — 99999 PR PBB SHADOW E&M-EST. PATIENT-LVL IV: CPT | Mod: PBBFAC,,, | Performed by: PHYSICAL MEDICINE & REHABILITATION

## 2020-07-09 PROCEDURE — 3075F SYST BP GE 130 - 139MM HG: CPT | Mod: CPTII,S$GLB,, | Performed by: PHYSICAL MEDICINE & REHABILITATION

## 2020-07-09 PROCEDURE — 1101F PR PT FALLS ASSESS DOC 0-1 FALLS W/OUT INJ PAST YR: ICD-10-PCS | Mod: CPTII,S$GLB,, | Performed by: PHYSICAL MEDICINE & REHABILITATION

## 2020-07-09 PROCEDURE — 99214 PR OFFICE/OUTPT VISIT, EST, LEVL IV, 30-39 MIN: ICD-10-PCS | Mod: S$GLB,,, | Performed by: PHYSICAL MEDICINE & REHABILITATION

## 2020-07-09 PROCEDURE — 99214 OFFICE O/P EST MOD 30 MIN: CPT | Mod: S$GLB,,, | Performed by: PHYSICAL MEDICINE & REHABILITATION

## 2020-07-09 PROCEDURE — 3008F PR BODY MASS INDEX (BMI) DOCUMENTED: ICD-10-PCS | Mod: CPTII,S$GLB,, | Performed by: PHYSICAL MEDICINE & REHABILITATION

## 2020-07-09 PROCEDURE — 3075F PR MOST RECENT SYSTOLIC BLOOD PRESS GE 130-139MM HG: ICD-10-PCS | Mod: CPTII,S$GLB,, | Performed by: PHYSICAL MEDICINE & REHABILITATION

## 2020-07-09 PROCEDURE — 99999 PR PBB SHADOW E&M-EST. PATIENT-LVL IV: ICD-10-PCS | Mod: PBBFAC,,, | Performed by: PHYSICAL MEDICINE & REHABILITATION

## 2020-07-09 PROCEDURE — 1101F PT FALLS ASSESS-DOCD LE1/YR: CPT | Mod: CPTII,S$GLB,, | Performed by: PHYSICAL MEDICINE & REHABILITATION

## 2020-07-09 NOTE — PATIENT INSTRUCTIONS
Back Exercises: Abdominal Lift Brace with Marching    The abdominal lift brace with march strengthens your lower abdominal muscles, helping you keep your pelvis and back stable:  · Lie on the floor with both knees bent. Put your feet flat on the floor and your arms by your sides. Tighten your abdominal muscles. Be sure to continue to breathe.  · Lift one bent knee about 2 inches then return it to the floor and lift the other about 2 inches. Keep your abdominal muscles tight and continue to breathe. These motions should be slow and controlled without your pelvis rocking side to side.  · Repeat 10 times.  Date Last Reviewed: 8/16/2015  © 2939-8970 [employer]. [address] [place] [address]. All rights reserved. This information is not intended as a substitute for professional medical care. Always follow your healthcare professional's instructions.        Back Exercises: Hip Rotator Stretch    To start, lie on your back with your knees bent and feet flat on the floor. Dont press your neck or lower back to the floor. Breathe deeply. You should feel comfortable and relaxed in this position.  · Rest your right ankle on your left knee.  · Place a towel behind your left thigh, and use it to pull the knee toward your chest. Feel the stretch in your buttocks.  · Hold for 30 to 60 seconds. Release.  · Repeat 2 times.  · Switch legs.   · If there is any pain other than stretch in the knee or buttock, stop and contact your healthcare provider.  For your safety, check with your healthcare provider before starting an exercise program.   Date Last Reviewed: 8/16/2015 © 2000-2017 [employer]. [address] [place] [address]. All rights reserved. This information is not intended as a substitute for professional medical care. Always follow your healthcare professional's instructions.        Back Exercises: Leg Pull    To start, lie on your back with your knees bent and feet flat on  the floor. Dont press your neck or lower back to the floor. Breathe deeply. You should feel comfortable and relaxed in this position.  · Pull one knee to your chest.  · Hold for 30 to 60 seconds. Return to starting position.  · Repeat 2 times.  · Switch legs.  · For a double leg pull, pull both legs to your chest at the same time. Repeat 2 times.  For your safety, check with your healthcare provider before starting an exercise program.   Date Last Reviewed: 8/16/2015  © 4019-4284 [employer]. [address] [place] [address]. All rights reserved. This information is not intended as a substitute for professional medical care. Always follow your healthcare professional's instructions.        Back Exercises: Lower Back Rotation    To start, lie on your back with your knees bent and feet flat on the floor. Dont press your neck or lower back to the floor. Breathe deeply. You should feel comfortable and relaxed in this position.  · Drop both knees to one side. Turn your head to the other side. Keep your shoulders flat on the floor.  · Do not push through pain.  · Hold for 20 seconds.  · Slowly switch sides.  · Repeat 2 to 5 times.  Date Last Reviewed: 10/11/2015  © 6104-0293 [employer]. [address] [place] [address]. All rights reserved. This information is not intended as a substitute for professional medical care. Always follow your healthcare professional's instructions.

## 2020-07-09 NOTE — PROGRESS NOTES
Established Patient Chronic Pain Note (Follow up visit)    Chief Complaint:   Chief Complaint   Patient presents with    Back Pain       SUBJECTIVE:  Kate Lira is a 64 y.o. female who presents to the clinic for a follow-up appointment for low back pain.  She underwent diagnostic medial branch blocks of L3-5 bilaterally on 05/25/2020 and received over 80% relief from these injections.  Since the last visit, Kate Lira states the pain has been improving. Current pain intensity is 6/10.    Patient denies night fever/night sweats, urinary incontinence, bowel incontinence, significant weight loss, significant motor weakness and loss of sensations.    Pain Disability Index Review:  Last 3 PDI Scores 7/9/2020 2/20/2020   Pain Disability Index (PDI) 30 37       Initial HPI 02/20/2020:  Kate Lira is a 64 y.o. female who presents to the clinic for the evaluation of lower back pain. She was referred by the physiatry department for further evaluation and management of this pain. The pain started several years ago without any significant injury or trauma recently, but did have a fall in 2005 and symptoms have been worsening over the last few months.The pain is located in the lower lumbar area and radiates to the right posterior buttock.  The pain is described as aching, numbing and tingling and is rated as 8/10. The pain is rated with a score of  6/10 on the BEST day and a score of 10/10 on the WORST day.  Symptoms interfere with daily activity. The pain is exacerbated by walking, bending.  The pain is mitigated by heat. The patient reports spending 2-4 hours per day reclining. The patient reports 6-8 hours of uninterrupted sleep per night.       Non-Pharmacologic Treatments:  Physical Therapy/Home Exercise: yes  Ice/Heat:yes  TENS: yes  Acupuncture: no  Massage: yes  Chiropractic: no    Other: no        Pain Medications:  - Opioids: Norco  - Adjuvant Medications: Advil, Tylenol, meloxicam, Voltaren gel  -  Anti-Coagulants: Aspirin      report:  Reviewed and consistent with medication use as prescribed.     Pain Procedures:   -previous lumbar epidural steroid injection 2-3 years ago  -05/25/2020:  Bilateral L3-5 medial branch blocks without steroid, 80% relief        Imaging:   X-ray lumbar spine 02/18/2020:  There is mild levoscoliosis of the lumbar spine.  Vertebral body heights are within normal limits.  There is slight grade 1 anterolisthesis of L5 on S1.  No change in spinal alignment with flexion or extension to suggest instability.  Mild-to-moderate generalized disc height loss is noted throughout the lumbar spine with multilevel degenerative endplate spurring.  There is also multilevel bilateral facet arthropathy.  No pars defects visualized.  Posterior elements appear grossly intact. No fractures demonstrated.  The remaining visualized osseous and soft tissue structures demonstrate no appreciable abnormality.          PMHx,PSHx, Social history, and Family history:  I have reviewed the patient's medical, surgical, social, and family history in detail and updated the computerized patient record.        Review of patient's allergies indicates:   Allergen Reactions    Ace inhibitors      Throat swelling       Current Outpatient Medications   Medication Sig    acetaminophen (TYLENOL) 650 MG TbSR Take 650 mg by mouth every 8 (eight) hours.    albuterol (PROVENTIL HFA) 90 mcg/actuation inhaler Inhale 2 puffs into the lungs every 4 (four) hours as needed for Wheezing. 2 puffs two times daily    aspirin (ECOTRIN) 81 MG EC tablet Take 1 tablet (81 mg total) by mouth once daily.    atorvastatin (LIPITOR) 20 MG tablet Take 1 tablet (20 mg total) by mouth every evening.    betaxolol 0.5% (BETOPTIC-S) 0.5 % Drop Place 1 drop into the left eye 2 (two) times daily.    brimonidine 0.2% (ALPHAGAN) 0.2 % Drop INSTILL 1 DROP IN LEFT EYE THREE TIMES DAILY    diclofenac sodium (VOLTAREN) 1 % Gel Apply topically.     ergocalciferol (ERGOCALCIFEROL) 50,000 unit Cap Take 50,000 Units by mouth.    fluticasone furoate-vilanterol (BREO) 100-25 mcg/dose diskus inhaler Inhale 1 puff into the lungs.    hydrochlorothiazide (HYDRODIURIL) 25 MG tablet TAKE ONE TABLET BY MOUTH EVERY DAY    hydrOXYzine (VISTARIL) 100 MG capsule Take 1 capsule by mouth 2 (two) times daily as needed.    isosorbide mononitrate (IMDUR) 30 MG 24 hr tablet Take 1 tablet (30 mg total) by mouth once daily.    latanoprost 0.005 % ophthalmic solution INSTILL 1 DROP IN BOTH EYES EVERY EVENING    meloxicam (MOBIC) 15 MG tablet Take 15 mg by mouth.    metoprolol tartrate (LOPRESSOR) 25 MG tablet Take 1 tablet (25 mg total) by mouth 2 (two) times daily.    nitroGLYCERIN (NITROSTAT) 0.4 MG SL tablet Place 1 tablet (0.4 mg total) under the tongue every 5 (five) minutes as needed for Chest pain.    oxybutynin (DITROPAN XL) 15 MG TR24 Take 5 mg by mouth once daily.     No current facility-administered medications for this visit.          REVIEW OF SYSTEMS:       GENERAL:  No weight loss, malaise or fevers.  HEENT:   No recent changes in vision or hearing  NECK:  Negative for lumps, no difficulty with swallowing.  RESPIRATORY:  Negative for cough, wheezing or shortness of breath, patient denies any recent URI.  CARDIOVASCULAR:  Negative for chest pain, leg swelling or palpitations.  GI:  Negative for abdominal discomfort, blood in stools or black stools or change in bowel habits.  MUSCULOSKELETAL:  See HPI.  SKIN:  Negative for lesions, rash, and itching.  PSYCH:  No mood disorder or recent psychosocial stressors.  Patients sleep is not disturbed secondary to pain.  HEMATOLOGY/LYMPHOLOGY:  Negative for prolonged bleeding, bruising easily or swollen nodes.  Patient is currently taking anti-coagulants - ASA  NEURO:   No history of headaches, syncope, paralysis, seizures or tremors.  All other reviewed and negative other than HPI.    OBJECTIVE:    /71   Pulse 73   " Ht 5' 8" (1.727 m)   Wt 116.3 kg (256 lb 6.3 oz)   BMI 38.98 kg/m²     PHYSICAL EXAMINATION:    GENERAL: Well appearing, in no acute distress, alert and oriented x3.  Obese  PSYCH:  Mood and affect appropriate.  SKIN: Skin color, texture, turgor normal, no rashes or lesions.  HEAD/FACE:  Normocephalic, atraumatic. Cranial nerves grossly intact.  NECK: No pain to palpation over the cervical paraspinous muscles. Spurling Negative. No pain with neck flexion, extension, or lateral flexion.   CV: RRR with palpation of the radial artery.  PULM: No evidence of respiratory difficulty, symmetric chest rise.  GI:  Soft and non-tender.  BACK: Positive axial loading test bilateral. Positive tenderness over both SIJ, Positive FABERE,Ganselin and Yeoman's test on the both side. Negative FADIR  Straight leg raising in the sitting and supine positions is negative to radicular pain. Mild-to-moderate pain to palpation over the facet joints of the lumbar spine, but not over the spinous processes.  Limited range of motion secondary to pain reproduction, mostly extension.  EXTREMITIES: Peripheral joint ROM is full and pain free without obvious instability or laxity in all four extremities. No deformities, edema, or skin discoloration. Good capillary refill.  MUSCULOSKELETAL: Shoulder, hip, and knee provocative maneuvers are negative.    Bilateral upper and lower extremity strength is normal and symmetric.  No atrophy or tone abnormalities are noted.  NEURO: Bilateral upper and lower extremity coordination and muscle stretch reflexes are physiologic and symmetric.  Plantar response are downgoing. No clonus.  No loss of sensation is noted.  GAIT:  Antalgic.         LABS:  Lab Results   Component Value Date    WBC 5.94 12/07/2016    HGB 13.6 12/07/2016    HCT 41.7 12/07/2016    MCV 94 12/07/2016     12/07/2016       CMP  Sodium   Date Value Ref Range Status   02/07/2017 138 136 - 145 mmol/L Final     Potassium   Date Value Ref " Range Status   02/07/2017 4.1 3.5 - 5.1 mmol/L Final     Chloride   Date Value Ref Range Status   02/07/2017 105 95 - 110 mmol/L Final     CO2   Date Value Ref Range Status   02/07/2017 28 23 - 29 mmol/L Final     Glucose   Date Value Ref Range Status   02/07/2017 90 70 - 110 mg/dL Final     BUN, Bld   Date Value Ref Range Status   02/07/2017 21 8 - 23 mg/dL Final     Creatinine   Date Value Ref Range Status   02/07/2017 1.1 0.5 - 1.4 mg/dL Final     Calcium   Date Value Ref Range Status   02/07/2017 9.4 8.7 - 10.5 mg/dL Final     Total Protein   Date Value Ref Range Status   02/07/2017 7.2 6.0 - 8.4 g/dL Final     Albumin   Date Value Ref Range Status   02/07/2017 3.5 3.5 - 5.2 g/dL Final     Total Bilirubin   Date Value Ref Range Status   02/07/2017 0.5 0.1 - 1.0 mg/dL Final     Comment:     For infants and newborns, interpretation of results should be based  on gestational age, weight and in agreement with clinical  observations.  Premature Infant recommended reference ranges:  Up to 24 hours.............<8.0 mg/dL  Up to 48 hours............<12.0 mg/dL  3-5 days..................<15.0 mg/dL  6-29 days.................<15.0 mg/dL       Alkaline Phosphatase   Date Value Ref Range Status   02/07/2017 71 55 - 135 U/L Final     AST   Date Value Ref Range Status   02/07/2017 21 10 - 40 U/L Final     ALT   Date Value Ref Range Status   02/07/2017 23 10 - 44 U/L Final     Anion Gap   Date Value Ref Range Status   02/07/2017 5 (L) 8 - 16 mmol/L Final     eGFR if    Date Value Ref Range Status   02/07/2017 >60.0 >60 mL/min/1.73 m^2 Final     eGFR if non    Date Value Ref Range Status   02/07/2017 53.9 (A) >60 mL/min/1.73 m^2 Final     Comment:     Calculation used to obtain the estimated glomerular filtration  rate (eGFR) is the CKD-EPI equation. Since race is unknown   in our information system, the eGFR values for   -American and Non--American patients are given   for each  creatinine result.         Lab Results   Component Value Date    HGBA1C 6.3 (H) 07/29/2016             ASSESSMENT: 64 y.o. year old female with lower back pain, consistent with     1. Spondylosis without myelopathy or radiculopathy, lumbar region     2. Osteoarthritis of spine with radiculopathy, lumbar region           PLAN:   - Interventions:  discussed with the patient the option of performing lumbar radiofrequency ablation at the same levels that she had successful lumbar medial branch blocks performed... Explained the risks and benefits of the procedure in detail with the patient today in clinic along with alternative treatment options, and the patient elected to defer  the intervention at this time.   she would like to consider this treatment option will contact our clinic when she may be ready to have this done.     - Anticoagulation use: yes aspirin       - Medications: I have stressed the importance of physical activity and a home exercise plan to help with pain and improve health. and Patient can continue with medications for now since they are providing benefits, using them appropriately, and without side effects.      - Therapy:  Continue with home exercises and activity as tolerated     - Labs:  Reviewed     - Imaging: Reviewed available imaging with patient and answered any questions they had regarding study.     - Consults/Referrals:  None at this time     - Records:  Reviewed/Obtain old records from outside physicians and imaging     - Follow up visit:   as needed     - Counseled patient regarding the importance of activity modification and physical therapy     - This condition does not require this patient to take time off of work, and the primary goal of our Pain Management services is to improve the patient's functional capacity.     - Patient Questions: Answered all of the patient's questions regarding diagnosis, therapy, and treatment    The above plan and management options were discussed at  length with patient. Patient is in agreement with the above and verbalized understanding.      Roger Bhandari MD  Interventional Pain Management  [facility]    Disclaimer:  This note was prepared using voice recognition system and is likely to have sound alike errors that may have been overlooked even after proof reading.  Please call me with any questions

## 2020-07-18 ENCOUNTER — HOSPITAL ENCOUNTER (EMERGENCY)
Facility: HOSPITAL | Age: 65
Discharge: HOME | End: 2020-07-18
Attending: EMERGENCY MEDICINE
Payer: COMMERCIAL

## 2020-07-18 VITALS
DIASTOLIC BLOOD PRESSURE: 72 MMHG | BODY MASS INDEX: 38.86 KG/M2 | TEMPERATURE: 98 F | OXYGEN SATURATION: 100 % | HEIGHT: 68 IN | SYSTOLIC BLOOD PRESSURE: 168 MMHG | RESPIRATION RATE: 18 BRPM | WEIGHT: 256.38 LBS | HEART RATE: 53 BPM

## 2020-07-18 DIAGNOSIS — K21.9 GASTROESOPHAGEAL REFLUX DISEASE WITHOUT ESOPHAGITIS: Primary | ICD-10-CM

## 2020-07-18 DIAGNOSIS — R07.9 CHEST PAIN: ICD-10-CM

## 2020-07-18 LAB
ALBUMIN SERPL BCP-MCNC: 3.8 G/DL (ref 3.5–5.2)
ALP SERPL-CCNC: 65 U/L (ref 55–135)
ALT SERPL W/O P-5'-P-CCNC: 22 U/L (ref 10–44)
ANION GAP SERPL CALC-SCNC: 8 MMOL/L (ref 8–16)
AST SERPL-CCNC: 23 U/L (ref 10–40)
BASOPHILS # BLD AUTO: 0.02 K/UL (ref 0–0.2)
BASOPHILS NFR BLD: 0.3 % (ref 0–1.9)
BILIRUB SERPL-MCNC: 0.7 MG/DL (ref 0.1–1)
BNP SERPL-MCNC: 31 PG/ML (ref 0–99)
BUN SERPL-MCNC: 19 MG/DL (ref 8–23)
CALCIUM SERPL-MCNC: 10.2 MG/DL (ref 8.7–10.5)
CHLORIDE SERPL-SCNC: 101 MMOL/L (ref 95–110)
CO2 SERPL-SCNC: 31 MMOL/L (ref 23–29)
CREAT SERPL-MCNC: 1.6 MG/DL (ref 0.5–1.4)
DIFFERENTIAL METHOD: ABNORMAL
EOSINOPHIL # BLD AUTO: 0.1 K/UL (ref 0–0.5)
EOSINOPHIL NFR BLD: 1.7 % (ref 0–8)
ERYTHROCYTE [DISTWIDTH] IN BLOOD BY AUTOMATED COUNT: 12.7 % (ref 11.5–14.5)
EST. GFR  (AFRICAN AMERICAN): 39 ML/MIN/1.73 M^2
EST. GFR  (NON AFRICAN AMERICAN): 34 ML/MIN/1.73 M^2
GLUCOSE SERPL-MCNC: 101 MG/DL (ref 70–110)
HCT VFR BLD AUTO: 41.9 % (ref 37–48.5)
HCV AB SERPL QL IA: NEGATIVE
HGB BLD-MCNC: 13.3 G/DL (ref 12–16)
IMM GRANULOCYTES # BLD AUTO: 0.01 K/UL (ref 0–0.04)
IMM GRANULOCYTES NFR BLD AUTO: 0.2 % (ref 0–0.5)
LYMPHOCYTES # BLD AUTO: 2.6 K/UL (ref 1–4.8)
LYMPHOCYTES NFR BLD: 43.7 % (ref 18–48)
MCH RBC QN AUTO: 31.1 PG (ref 27–31)
MCHC RBC AUTO-ENTMCNC: 31.7 G/DL (ref 32–36)
MCV RBC AUTO: 98 FL (ref 82–98)
MONOCYTES # BLD AUTO: 0.6 K/UL (ref 0.3–1)
MONOCYTES NFR BLD: 9.7 % (ref 4–15)
NEUTROPHILS # BLD AUTO: 2.6 K/UL (ref 1.8–7.7)
NEUTROPHILS NFR BLD: 44.4 % (ref 38–73)
NRBC BLD-RTO: 0 /100 WBC
PLATELET # BLD AUTO: 215 K/UL (ref 150–350)
PMV BLD AUTO: 10.4 FL (ref 9.2–12.9)
POTASSIUM SERPL-SCNC: 4.4 MMOL/L (ref 3.5–5.1)
PROT SERPL-MCNC: 7.8 G/DL (ref 6–8.4)
RBC # BLD AUTO: 4.28 M/UL (ref 4–5.4)
SODIUM SERPL-SCNC: 140 MMOL/L (ref 136–145)
TROPONIN I SERPL DL<=0.01 NG/ML-MCNC: 0.02 NG/ML (ref 0–0.03)
WBC # BLD AUTO: 5.86 K/UL (ref 3.9–12.7)

## 2020-07-18 PROCEDURE — 86803 HEPATITIS C AB TEST: CPT

## 2020-07-18 PROCEDURE — 84484 ASSAY OF TROPONIN QUANT: CPT

## 2020-07-18 PROCEDURE — 25000003 PHARM REV CODE 250: Performed by: EMERGENCY MEDICINE

## 2020-07-18 PROCEDURE — 80053 COMPREHEN METABOLIC PANEL: CPT

## 2020-07-18 PROCEDURE — 93005 ELECTROCARDIOGRAM TRACING: CPT

## 2020-07-18 PROCEDURE — 99284 EMERGENCY DEPT VISIT MOD MDM: CPT | Mod: 25

## 2020-07-18 PROCEDURE — 85025 COMPLETE CBC W/AUTO DIFF WBC: CPT

## 2020-07-18 PROCEDURE — 36000 PLACE NEEDLE IN VEIN: CPT

## 2020-07-18 PROCEDURE — 93010 EKG 12-LEAD: ICD-10-PCS | Mod: ,,, | Performed by: INTERNAL MEDICINE

## 2020-07-18 PROCEDURE — 36415 COLL VENOUS BLD VENIPUNCTURE: CPT

## 2020-07-18 PROCEDURE — 93010 ELECTROCARDIOGRAM REPORT: CPT | Mod: ,,, | Performed by: INTERNAL MEDICINE

## 2020-07-18 PROCEDURE — 83880 ASSAY OF NATRIURETIC PEPTIDE: CPT

## 2020-07-18 RX ORDER — ASPIRIN 325 MG
325 TABLET ORAL
Status: COMPLETED | OUTPATIENT
Start: 2020-07-18 | End: 2020-07-18

## 2020-07-18 RX ADMIN — ASPIRIN 325 MG ORAL TABLET 325 MG: 325 PILL ORAL at 02:07

## 2020-07-18 NOTE — ED PROVIDER NOTES
"SCRIBE #1 NOTE: I, Noble Reilly, am scribing for, and in the presence of, Suzanne Luis MD. I have scribed the HPI, ROS, PEx.     SCRIBE #2 NOTE: I, Peg Deshpande, am scribing for, and in the presence of,  Alex Saenz Jr., MD. I have scribed the remaining portions of the note not scribed by Scribe #1.      History     Chief Complaint   Patient presents with    Chest Pain     started last night around 6:30 pm, had trouble sleeping last night, took a nitro around 7pm and it helped a little bit. points to left chest, describes as soreness.     Review of patient's allergies indicates:   Allergen Reactions    Ace inhibitors      Throat swelling         History of Present Illness     HPI    7/18/2020, 11:19 AM  History obtained from the patient      History of Present Illness: Kate Lira is a 64 y.o. female patient with a PMHx of HTN, coronary angioplasty, who presents to the Emergency Department for evaluation of CP which onset gradually 1 day PTA. Pt states that she was arguing with someone, and felt "2 bangs in my heart muscle area". Symptoms are constant and moderate in severity. Pt reports some relief with NTG, Aspirin, taken around 7:30 PM last night. No associated sxs reported. Patient denies any fever, chills, sore throat, cough, n/v/d, SOB, HA, syncope, weakness, and all other sxs at this time. No further complaints or concerns at this time.       Arrival mode: Personal vehicle     PCP: Primary Doctor No        Past Medical History:  Past Medical History:   Diagnosis Date    Arthritis     Asthma     Glaucoma     Hypertension        Past Surgical History:  Past Surgical History:   Procedure Laterality Date    CHOLECYSTECTOMY      CORONARY ANGIOPLASTY  02/2015    INJECTION OF ANESTHETIC AGENT AROUND MEDIAL BRANCH NERVES INNERVATING LUMBAR FACET JOINT Bilateral 5/25/2020    Procedure: Bilateral L3-5 MBB with local;  Surgeon: Roger Bhandari MD;  Location: [facility];  Service: Pain " Management;  Laterality: Bilateral;    KNEE SURGERY      left    TUBAL LIGATION           Family History:  Family History   Problem Relation Age of Onset    Hypertension Mother     Heart disease Mother     Heart attack Mother 64        MI    Diabetes Father     Osteoarthritis Sister     Heart disease Sister     Cancer Sister         1  sister had cervical ca    Osteoarthritis Brother     Heart disease Brother        Social History:  Social History     Tobacco Use    Smoking status: Never Smoker    Smokeless tobacco: Never Used   Substance and Sexual Activity    Alcohol use: No    Drug use: No    Sexual activity: Yes     Partners: Male        Review of Systems     Review of Systems   Constitutional: Negative for activity change, appetite change, chills, diaphoresis and fever.   HENT: Negative for congestion, drooling, ear pain, mouth sores, rhinorrhea, sinus pain, sore throat and trouble swallowing.    Eyes: Negative for pain and discharge.   Respiratory: Negative for cough, chest tightness, shortness of breath, wheezing and stridor.    Cardiovascular: Positive for chest pain. Negative for palpitations and leg swelling.   Gastrointestinal: Negative for abdominal distention, abdominal pain, blood in stool, constipation, diarrhea, nausea and vomiting.   Genitourinary: Negative for difficulty urinating, dysuria, flank pain, frequency, hematuria and urgency.   Musculoskeletal: Negative for arthralgias, back pain and myalgias.   Skin: Negative for pallor, rash and wound.   Neurological: Negative for dizziness, seizures, syncope, weakness, light-headedness, numbness and headaches.   All other systems reviewed and are negative.       Physical Exam     Initial Vitals [20 1101]   BP Pulse Resp Temp SpO2   (!) 160/73 64 16 98.2 °F (36.8 °C) 96 %      MAP       --          Physical Exam  Nursing Notes and Vital Signs Reviewed.  Constitutional: Patient is in no acute distress. Well-developed and  "well-nourished.  Head: Atraumatic. Normocephalic.  Eyes: PERRL. EOM intact. Conjunctivae are not pale. No scleral icterus.  ENT: Mucous membranes are moist. Oropharynx is clear and symmetric.    Neck: Supple. Full ROM. No lymphadenopathy.  Cardiovascular: Regular rate. Regular rhythm. No murmurs, rubs, or gallops. Distal pulses are 2+ and symmetric.  Pulmonary/Chest: No respiratory distress. Clear to auscultation bilaterally. No wheezing or rales.  Abdominal: Soft and non-distended.  There is no tenderness.  No rebound, guarding, or rigidity. Good bowel sounds.  Genitourinary: No CVA tenderness  Musculoskeletal: Moves all extremities. No obvious deformities. No edema. No calf tenderness.  Skin: Warm and dry.  Neurological:  Alert, awake, and appropriate.  Normal speech.  No acute focal neurological deficits are appreciated.  Psychiatric: Normal affect. Good eye contact. Appropriate in content.     ED Course   Procedures  ED Vital Signs:  Vitals:    07/18/20 1101 07/18/20 1112 07/18/20 1415 07/18/20 1425   BP: (!) 160/73  (!) 155/72    Pulse: 64 (!) 59 (!) 59 (!) 50   Resp: 16  18    Temp: 98.2 °F (36.8 °C)      SpO2: 96%  98%    Weight: 116.3 kg (256 lb 6.3 oz)      Height: 5' 8" (1.727 m)       07/18/20 1430   BP: (!) 145/67   Pulse: (!) 48   Resp: 17   Temp:    SpO2: 100%   Weight:    Height:        Abnormal Lab Results:  Labs Reviewed   CBC W/ AUTO DIFFERENTIAL - Abnormal; Notable for the following components:       Result Value    Mean Corpuscular Hemoglobin 31.1 (*)     Mean Corpuscular Hemoglobin Conc 31.7 (*)     All other components within normal limits   HEPATITIS C ANTIBODY   COMPREHENSIVE METABOLIC PANEL   TROPONIN I   B-TYPE NATRIURETIC PEPTIDE        All Lab Results:  Results for orders placed or performed during the hospital encounter of 07/18/20   CBC auto differential   Result Value Ref Range    WBC 5.86 3.90 - 12.70 K/uL    RBC 4.28 4.00 - 5.40 M/uL    Hemoglobin 13.3 12.0 - 16.0 g/dL    Hematocrit " 41.9 37.0 - 48.5 %    Mean Corpuscular Volume 98 82 - 98 fL    Mean Corpuscular Hemoglobin 31.1 (H) 27.0 - 31.0 pg    Mean Corpuscular Hemoglobin Conc 31.7 (L) 32.0 - 36.0 g/dL    RDW 12.7 11.5 - 14.5 %    Platelets 215 150 - 350 K/uL    MPV 10.4 9.2 - 12.9 fL    Immature Granulocytes 0.2 0.0 - 0.5 %    Gran # (ANC) 2.6 1.8 - 7.7 K/uL    Immature Grans (Abs) 0.01 0.00 - 0.04 K/uL    Lymph # 2.6 1.0 - 4.8 K/uL    Mono # 0.6 0.3 - 1.0 K/uL    Eos # 0.1 0.0 - 0.5 K/uL    Baso # 0.02 0.00 - 0.20 K/uL    nRBC 0 0 /100 WBC    Gran% 44.4 38.0 - 73.0 %    Lymph% 43.7 18.0 - 48.0 %    Mono% 9.7 4.0 - 15.0 %    Eosinophil% 1.7 0.0 - 8.0 %    Basophil% 0.3 0.0 - 1.9 %    Differential Method Automated        Imaging Results:  Imaging Results    None          The EKG was ordered, reviewed, and independently interpreted by the ED provider.  Interpretation time: 1112  Rate: 59 BPM  Rhythm: Sinus bradycardia with 1st degree AV block  Interpretation: Minimal voltage criteria for LVH, mar be normal variant. Septal infarct, age undetermined. No STEMI.         The Emergency Provider reviewed the vital signs and test results, which are outlined above.     ED Discussion     4:00 PM: Dr. Luis transfers care of pt to Dr. Saenz pending lab results.    5:01 PM  Patient is stable nontoxic.  Patient does have a cardiac history with stent placed 2 years ago however she notes after an argument with family last night that she had epigastric pain with reflux type symptoms.  Symptoms have resolved now.  Workup is negative with prolonged symptoms per EKG is unchanged.  This clinically is not cardiac in nature.  I have referred back to Cardiology for further evaluation and testing.  She is safe for discharge in my opinion.  I have advised Pepcid or Tums for symptoms and return for symptoms worsen in any way.  The patient verbalized understanding agreement with all instructions and seems reliable.       Medical Decision Making:   Clinical Tests:    Lab Tests: Ordered and Reviewed  Medical Tests: Ordered and Reviewed           ED Medication(s):  Medications   aspirin tablet 325 mg (325 mg Oral Given 7/18/20 1433)       New Prescriptions    No medications on file               Scribe Attestation:   Scribe #1: I performed the above scribed service and the documentation accurately describes the services I performed. I attest to the accuracy of the note.     Attending:   Physician Attestation Statement for Scribe #1: I, Suzanne Luis MD, personally performed the services described in this documentation, as scribed by Noble Reilly, in my presence, and it is both accurate and complete.       Scribe Attestation:   Scribe #2: I performed the above scribed service and the documentation accurately describes the services I performed. I attest to the accuracy of the note.    Attending Attestation:           Physician Attestation for Scribe:    Physician Attestation Statement for Scribe #2: I, Alex Saenz Jr., MD, reviewed documentation, as scribed by Peg Deshpande in my presence, and it is both accurate and complete. I also acknowledge and confirm the content of the note done by Scribe #1.           Clinical Impression       ICD-10-CM ICD-9-CM   1. Chest pain  R07.9 786.50       Disposition:   Disposition: Discharged  Condition: Stable         Alex Saenz Jr., MD  07/18/20 1701       Alex Saenz Jr., MD  07/18/20 1702

## 2020-07-18 NOTE — DISCHARGE INSTRUCTIONS
Your heart workup today is negative.  Please follow up with cardiologist this week for reassurance as well as a possible stress test.  Her symptoms appear to be related to heartburn.  Use Pepcid or Tums for the symptoms.  Return as needed for any worsening symptoms, problems, questions or concerns.

## 2020-07-18 NOTE — ED PROVIDER NOTES
Encounter Date: 2020       History     Chief Complaint   Patient presents with    Chest Pain     started last night around 6:30 pm, had trouble sleeping last night, took a nitro around 7pm and it helped a little bit. points to left chest, describes as soreness.     HPI  Review of patient's allergies indicates:   Allergen Reactions    Ace inhibitors      Throat swelling     Past Medical History:   Diagnosis Date    Arthritis     Asthma     Glaucoma     Hypertension      Past Surgical History:   Procedure Laterality Date    CHOLECYSTECTOMY      CORONARY ANGIOPLASTY  2015    INJECTION OF ANESTHETIC AGENT AROUND MEDIAL BRANCH NERVES INNERVATING LUMBAR FACET JOINT Bilateral 2020    Procedure: Bilateral L3-5 MBB with local;  Surgeon: Roger Bhandari MD;  Location: [facility];  Service: Pain Management;  Laterality: Bilateral;    KNEE SURGERY      left    TUBAL LIGATION       Family History   Problem Relation Age of Onset    Hypertension Mother     Heart disease Mother     Heart attack Mother 64        MI    Diabetes Father     Osteoarthritis Sister     Heart disease Sister     Cancer Sister         1  sister had cervical ca    Osteoarthritis Brother     Heart disease Brother      Social History     Tobacco Use    Smoking status: Never Smoker    Smokeless tobacco: Never Used   Substance Use Topics    Alcohol use: No    Drug use: No     Review of Systems    Physical Exam     Initial Vitals [20 1101]   BP Pulse Resp Temp SpO2   (!) 160/73 64 16 98.2 °F (36.8 °C) 96 %      MAP       --         Physical Exam    ED Course   Procedures  Labs Reviewed   CBC W/ AUTO DIFFERENTIAL - Abnormal; Notable for the following components:       Result Value    Mean Corpuscular Hemoglobin 31.1 (*)     Mean Corpuscular Hemoglobin Conc 31.7 (*)     All other components within normal limits   COMPREHENSIVE METABOLIC PANEL - Abnormal; Notable for the following components:    CO2 31 (*)      Creatinine 1.6 (*)     eGFR if  39 (*)     eGFR if non  34 (*)     All other components within normal limits   HEPATITIS C ANTIBODY   TROPONIN I   B-TYPE NATRIURETIC PEPTIDE          Imaging Results    None                                          Clinical Impression:   {Add your Clinical Impression here. If you haven't documented one yet, please pend the note, finalize a Clinical Impression, and refresh your note before signing.:[id number]}          ED Disposition Condition    Discharge Stable        ED Prescriptions     None        Follow-up Information     Follow up With Specialties Details Why Contact Info    Med Bateman MD Cardiology, INTERVENTIONAL CARDIOLOGY In 3 days  [id number]  [place] [address] [phone]

## 2020-08-07 ENCOUNTER — OFFICE VISIT (OUTPATIENT)
Dept: OPHTHALMOLOGY | Facility: CLINIC | Age: 65
End: 2020-08-07
Payer: COMMERCIAL

## 2020-08-07 DIAGNOSIS — H40.1111 PRIMARY OPEN ANGLE GLAUCOMA (POAG) OF RIGHT EYE, MILD STAGE: ICD-10-CM

## 2020-08-07 DIAGNOSIS — H40.1123 PRIMARY OPEN ANGLE GLAUCOMA (POAG) OF LEFT EYE, SEVERE STAGE: Primary | ICD-10-CM

## 2020-08-07 PROCEDURE — 92012 PR EYE EXAM, EST PATIENT,INTERMED: ICD-10-PCS | Mod: S$GLB,,, | Performed by: OPTOMETRIST

## 2020-08-07 PROCEDURE — 92012 INTRM OPH EXAM EST PATIENT: CPT | Mod: S$GLB,,, | Performed by: OPTOMETRIST

## 2020-08-07 PROCEDURE — 99999 PR PBB SHADOW E&M-EST. PATIENT-LVL III: ICD-10-PCS | Mod: PBBFAC,,, | Performed by: OPTOMETRIST

## 2020-08-07 PROCEDURE — 99999 PR PBB SHADOW E&M-EST. PATIENT-LVL III: CPT | Mod: PBBFAC,,, | Performed by: OPTOMETRIST

## 2020-08-07 NOTE — PROGRESS NOTES
HPI     Patient last visit with DNL on 06/08/2020 for 3-4 months IOP check.  Was told to rtc 1-2 months for IOP Check.  Medication eye drops if any: Latanoprost QHS OU, Brimonidine BID OS,   Betaxolol BID OS  Last HVF: 12/30/19  Last gOCT: 12/11/19  Last SDPs:12/30/19    1. POAG OU  2. NSC OU        Last edited by Arlette Quiroz on 8/7/2020 10:36 AM. (History)            Assessment /Plan     For exam results, see Encounter Report.    Primary open angle glaucoma (POAG) of left eye, severe stage    Primary open angle glaucoma (POAG) of right eye, mild stage    IOP elevated today OU  Pt misunderstood directions and has not been using drops correctly  Written instructions were provided to patient today on when to take drops    Continue latanoprost qhs OU, brimonidine bid OS and betaxolol bid OS      RTC 1 month for IOP check or PRN  Discussed above and all questions were answered.

## 2020-09-08 ENCOUNTER — OFFICE VISIT (OUTPATIENT)
Dept: OPHTHALMOLOGY | Facility: CLINIC | Age: 65
End: 2020-09-08
Payer: COMMERCIAL

## 2020-09-08 DIAGNOSIS — H40.1111 PRIMARY OPEN ANGLE GLAUCOMA (POAG) OF RIGHT EYE, MILD STAGE: ICD-10-CM

## 2020-09-08 DIAGNOSIS — H40.1123 PRIMARY OPEN ANGLE GLAUCOMA (POAG) OF LEFT EYE, SEVERE STAGE: Primary | ICD-10-CM

## 2020-09-08 PROCEDURE — 99999 PR PBB SHADOW E&M-EST. PATIENT-LVL III: ICD-10-PCS | Mod: PBBFAC,,, | Performed by: OPTOMETRIST

## 2020-09-08 PROCEDURE — 92012 INTRM OPH EXAM EST PATIENT: CPT | Mod: S$GLB,,, | Performed by: OPTOMETRIST

## 2020-09-08 PROCEDURE — 99999 PR PBB SHADOW E&M-EST. PATIENT-LVL III: CPT | Mod: PBBFAC,,, | Performed by: OPTOMETRIST

## 2020-09-08 PROCEDURE — 92012 PR EYE EXAM, EST PATIENT,INTERMED: ICD-10-PCS | Mod: S$GLB,,, | Performed by: OPTOMETRIST

## 2020-09-08 NOTE — PROGRESS NOTES
HPI     Patient last visit with DNL on 08/07/2020 for 1-2 months IOP check.  Was told to rtc 1 month for IOP Check.     1. POAG OU  2. NSC OU    Medication eye drops if any: Latanoprost QHS OU, Brimonidine BID OS,   Betaxolol BID OS  Last HVF: 12/30/19  Last gOCT: 12/11/19  Last SDPs:12/30/19    Last edited by Arlette Quiroz on 9/8/2020 10:06 AM. (History)            Assessment /Plan     For exam results, see Encounter Report.    Primary open angle glaucoma (POAG) of left eye, severe stage    Primary open angle glaucoma (POAG) of right eye, mild stage    IOP stable today and within acceptable range OU  Continue current treatment  Recommended artificial tears bid-tid OU for irritation  Monitor 3 months    Latanoprost qhs OU, Brimonidine bid OS, Betaxolol bid OS    RTC 3 months for 24-2VF, gOCT and dilation or PRN  Discussed above and all questions were answered.

## 2020-11-27 ENCOUNTER — TELEPHONE (OUTPATIENT)
Dept: PAIN MEDICINE | Facility: CLINIC | Age: 65
End: 2020-11-27

## 2020-11-27 NOTE — TELEPHONE ENCOUNTER
Pt informed I need clearance from Dr Whitney Rajput and once obtained will schedule . Informed pt I sent message   Back in June , but never heard back . Pt understood. All questions answered.   Matt Edmonds MA  [facility]

## 2020-11-27 NOTE — TELEPHONE ENCOUNTER
Good morning/afternoon,  Dr. Bhandari would like to perform a lumbar RFA  , but patient is on aspirin and would need to hold all asa products 7 days prior, along with any other blood thinners. Please advise.

## 2020-11-29 NOTE — PRE-PROCEDURE INSTRUCTIONS
Spoke with   patient    regarding procedure scheduled on 5/25/2020  Arrival time 0900  Has patient been sick with fever or on antibiotics within the last 7 days? no  Has patient received a vaccination within the last 7 days? no  Has the patient stopped all medications as directed? NA  Does patient have a pacemaker and or defibrillator? no  Does the patient have a ride to and from procedure and someone reliable to remain with patient? Yes daughter kit [phone]  Is the patient diabetic? no  Does the patient have sleep apnea? Or use O2 at home? No no  Is the patient receiving sedation? yes  Is the patient instructed to remain NPO beginning at midnight the night before their procedure? yes  Procedure location confirmed with patient? yes  covid testing to be scheduled.     Edit; covid testing scheduled for 5/23/2020 0830am [facility]    
2

## 2020-12-08 ENCOUNTER — OFFICE VISIT (OUTPATIENT)
Dept: OPHTHALMOLOGY | Facility: CLINIC | Age: 65
End: 2020-12-08
Payer: COMMERCIAL

## 2020-12-08 DIAGNOSIS — H40.1111 PRIMARY OPEN ANGLE GLAUCOMA (POAG) OF RIGHT EYE, MILD STAGE: ICD-10-CM

## 2020-12-08 DIAGNOSIS — H52.13 MYOPIA WITH ASTIGMATISM AND PRESBYOPIA, BILATERAL: ICD-10-CM

## 2020-12-08 DIAGNOSIS — H25.13 NUCLEAR SCLEROSIS, BILATERAL: ICD-10-CM

## 2020-12-08 DIAGNOSIS — H52.4 MYOPIA WITH ASTIGMATISM AND PRESBYOPIA, BILATERAL: ICD-10-CM

## 2020-12-08 DIAGNOSIS — H52.203 MYOPIA WITH ASTIGMATISM AND PRESBYOPIA, BILATERAL: ICD-10-CM

## 2020-12-08 DIAGNOSIS — H40.1123 PRIMARY OPEN ANGLE GLAUCOMA (POAG) OF LEFT EYE, SEVERE STAGE: Primary | ICD-10-CM

## 2020-12-08 PROCEDURE — 92015 PR REFRACTION: ICD-10-PCS | Mod: S$GLB,,, | Performed by: OPTOMETRIST

## 2020-12-08 PROCEDURE — 92015 DETERMINE REFRACTIVE STATE: CPT | Mod: S$GLB,,, | Performed by: OPTOMETRIST

## 2020-12-08 PROCEDURE — 92083 EXTENDED VISUAL FIELD XM: CPT | Mod: S$GLB,,, | Performed by: OPTOMETRIST

## 2020-12-08 PROCEDURE — 92083 HUMPHREY VISUAL FIELD - OU - BOTH EYES: ICD-10-PCS | Mod: S$GLB,,, | Performed by: OPTOMETRIST

## 2020-12-08 PROCEDURE — 99999 PR PBB SHADOW E&M-EST. PATIENT-LVL III: CPT | Mod: PBBFAC,,, | Performed by: OPTOMETRIST

## 2020-12-08 PROCEDURE — 92133 POSTERIOR SEGMENT OCT OPTIC NERVE(OCULAR COHERENCE TOMOGRAPHY) - OU - BOTH EYES: ICD-10-PCS | Mod: S$GLB,,, | Performed by: OPTOMETRIST

## 2020-12-08 PROCEDURE — 92014 PR EYE EXAM, EST PATIENT,COMPREHESV: ICD-10-PCS | Mod: S$GLB,,, | Performed by: OPTOMETRIST

## 2020-12-08 PROCEDURE — 92014 COMPRE OPH EXAM EST PT 1/>: CPT | Mod: S$GLB,,, | Performed by: OPTOMETRIST

## 2020-12-08 PROCEDURE — 92133 CPTRZD OPH DX IMG PST SGM ON: CPT | Mod: S$GLB,,, | Performed by: OPTOMETRIST

## 2020-12-08 PROCEDURE — 99999 PR PBB SHADOW E&M-EST. PATIENT-LVL III: ICD-10-PCS | Mod: PBBFAC,,, | Performed by: OPTOMETRIST

## 2020-12-08 NOTE — PROGRESS NOTES
HPI     PT was last seen on 9/8/2020 with DNL for IOP check. PT was told to RTC  3 months for HVF,gOCT and DFE.  Vision stable  Medication eye drops if any: Latanoprost QHS OU,Brimnidine BID OS,   Betaxolol BID OS  Using drops as directed  Last HVF: 12/8/20  Last gOCT: 12/8/20  Last SDPs:12/30/19      Last edited by Ashley Beltran, PCT on 12/8/2020 10:42 AM. (History)              Assessment /Plan     For exam results, see Encounter Report.    Primary open angle glaucoma (POAG) of left eye, severe stage  -     Fitch Visual Field - OU - Extended - Both Eyes  -     Posterior Segment OCT Optic Nerve- Both eyes    Primary open angle glaucoma (POAG) of right eye, mild stage  -     Fitch Visual Field - OU - Extended - Both Eyes  -     Posterior Segment OCT Optic Nerve- Both eyes    IOP elevated today OS   VF stable OS compared to previous but progression noted on NFL gOCT OS  No VF defects OD    Pt admits to missing some doses  Pt also states she has been using gtts immediately after each other  Instructed patient to wait at least 10 minutes in between  Stressed compliance    Latanoprost qhs OU  brimonidine bid OS  Betaxolol bid OS    Consult MGM    Nuclear sclerosis, bilateral  Surgery is not indicated at this time.   Monitor 12 months.        RTC 1-2 weeks for consult with MGM or PRN  Discussed above and all questions were answered.

## 2021-01-12 ENCOUNTER — OFFICE VISIT (OUTPATIENT)
Dept: OPHTHALMOLOGY | Facility: CLINIC | Age: 66
End: 2021-01-12
Payer: COMMERCIAL

## 2021-01-12 DIAGNOSIS — H04.129 DRY EYE: ICD-10-CM

## 2021-01-12 DIAGNOSIS — H40.1123 PRIMARY OPEN ANGLE GLAUCOMA (POAG) OF LEFT EYE, SEVERE STAGE: Primary | ICD-10-CM

## 2021-01-12 DIAGNOSIS — H40.1111 PRIMARY OPEN ANGLE GLAUCOMA (POAG) OF RIGHT EYE, MILD STAGE: ICD-10-CM

## 2021-01-12 PROCEDURE — 92002 INTRM OPH EXAM NEW PATIENT: CPT | Mod: S$GLB,,, | Performed by: OPHTHALMOLOGY

## 2021-01-12 PROCEDURE — 99999 PR PBB SHADOW E&M-EST. PATIENT-LVL III: CPT | Mod: PBBFAC,,, | Performed by: OPHTHALMOLOGY

## 2021-01-12 PROCEDURE — 92020 GONIOSCOPY: CPT | Mod: S$GLB,,, | Performed by: OPHTHALMOLOGY

## 2021-01-12 PROCEDURE — 92002 PR EYE EXAM, NEW PATIENT,INTERMED: ICD-10-PCS | Mod: S$GLB,,, | Performed by: OPHTHALMOLOGY

## 2021-01-12 PROCEDURE — 92020 PR SPECIAL EYE EVAL,GONIOSCOPY: ICD-10-PCS | Mod: S$GLB,,, | Performed by: OPHTHALMOLOGY

## 2021-01-12 PROCEDURE — 99999 PR PBB SHADOW E&M-EST. PATIENT-LVL III: ICD-10-PCS | Mod: PBBFAC,,, | Performed by: OPHTHALMOLOGY

## 2021-01-12 RX ORDER — DORZOLAMIDE HCL 20 MG/ML
1 SOLUTION/ DROPS OPHTHALMIC 2 TIMES DAILY
Qty: 10 ML | Refills: 6 | Status: SHIPPED | OUTPATIENT
Start: 2021-01-12 | End: 2022-06-29

## 2021-01-12 RX ORDER — CHOLECALCIFEROL (VITAMIN D3) 25 MCG
TABLET ORAL
COMMUNITY

## 2021-02-09 ENCOUNTER — OFFICE VISIT (OUTPATIENT)
Dept: OPHTHALMOLOGY | Facility: CLINIC | Age: 66
End: 2021-02-09
Payer: COMMERCIAL

## 2021-02-09 DIAGNOSIS — H25.13 NUCLEAR SCLEROSIS, BILATERAL: ICD-10-CM

## 2021-02-09 DIAGNOSIS — H40.1123 PRIMARY OPEN ANGLE GLAUCOMA (POAG) OF LEFT EYE, SEVERE STAGE: Primary | ICD-10-CM

## 2021-02-09 DIAGNOSIS — H40.1111 PRIMARY OPEN ANGLE GLAUCOMA (POAG) OF RIGHT EYE, MILD STAGE: ICD-10-CM

## 2021-02-09 DIAGNOSIS — H04.129 DRY EYE: ICD-10-CM

## 2021-02-09 PROCEDURE — 99999 PR PBB SHADOW E&M-EST. PATIENT-LVL III: ICD-10-PCS | Mod: PBBFAC,,, | Performed by: OPHTHALMOLOGY

## 2021-02-09 PROCEDURE — 99999 PR PBB SHADOW E&M-EST. PATIENT-LVL III: CPT | Mod: PBBFAC,,, | Performed by: OPHTHALMOLOGY

## 2021-02-09 PROCEDURE — 99214 OFFICE O/P EST MOD 30 MIN: CPT | Mod: S$GLB,,, | Performed by: OPHTHALMOLOGY

## 2021-02-09 PROCEDURE — 99214 PR OFFICE/OUTPT VISIT, EST, LEVL IV, 30-39 MIN: ICD-10-PCS | Mod: S$GLB,,, | Performed by: OPHTHALMOLOGY

## 2021-02-09 PROCEDURE — 1159F PR MEDICATION LIST DOCUMENTED IN MEDICAL RECORD: ICD-10-PCS | Mod: S$GLB,,, | Performed by: OPHTHALMOLOGY

## 2021-02-09 PROCEDURE — 1159F MED LIST DOCD IN RCRD: CPT | Mod: S$GLB,,, | Performed by: OPHTHALMOLOGY

## 2021-03-22 ENCOUNTER — TELEPHONE (OUTPATIENT)
Dept: OPHTHALMOLOGY | Facility: CLINIC | Age: 66
End: 2021-03-22

## 2021-04-28 ENCOUNTER — PATIENT MESSAGE (OUTPATIENT)
Dept: RESEARCH | Facility: HOSPITAL | Age: 66
End: 2021-04-28

## 2021-07-13 ENCOUNTER — OFFICE VISIT (OUTPATIENT)
Dept: OPHTHALMOLOGY | Facility: CLINIC | Age: 66
End: 2021-07-13
Payer: COMMERCIAL

## 2021-07-13 ENCOUNTER — OFFICE VISIT (OUTPATIENT)
Dept: OBSTETRICS AND GYNECOLOGY | Facility: CLINIC | Age: 66
End: 2021-07-13
Payer: COMMERCIAL

## 2021-07-13 VITALS
DIASTOLIC BLOOD PRESSURE: 74 MMHG | BODY MASS INDEX: 38.96 KG/M2 | HEIGHT: 68 IN | WEIGHT: 257.06 LBS | SYSTOLIC BLOOD PRESSURE: 128 MMHG

## 2021-07-13 DIAGNOSIS — H04.129 DRY EYE: ICD-10-CM

## 2021-07-13 DIAGNOSIS — H40.1123 PRIMARY OPEN ANGLE GLAUCOMA (POAG) OF LEFT EYE, SEVERE STAGE: Primary | ICD-10-CM

## 2021-07-13 DIAGNOSIS — H25.13 NUCLEAR SCLEROSIS, BILATERAL: ICD-10-CM

## 2021-07-13 DIAGNOSIS — Z12.4 ENCOUNTER FOR SCREENING FOR CERVICAL CANCER: ICD-10-CM

## 2021-07-13 DIAGNOSIS — H40.1111 PRIMARY OPEN ANGLE GLAUCOMA (POAG) OF RIGHT EYE, MILD STAGE: ICD-10-CM

## 2021-07-13 DIAGNOSIS — Z91.199 NON-COMPLIANCE: ICD-10-CM

## 2021-07-13 DIAGNOSIS — Z01.419 WELL WOMAN EXAM WITH ROUTINE GYNECOLOGICAL EXAM: Primary | ICD-10-CM

## 2021-07-13 PROCEDURE — 3288F PR FALLS RISK ASSESSMENT DOCUMENTED: ICD-10-PCS | Mod: CPTII,S$GLB,, | Performed by: NURSE PRACTITIONER

## 2021-07-13 PROCEDURE — 1101F PT FALLS ASSESS-DOCD LE1/YR: CPT | Mod: CPTII,S$GLB,, | Performed by: NURSE PRACTITIONER

## 2021-07-13 PROCEDURE — 1125F AMNT PAIN NOTED PAIN PRSNT: CPT | Mod: S$GLB,,, | Performed by: NURSE PRACTITIONER

## 2021-07-13 PROCEDURE — 99999 PR PBB SHADOW E&M-EST. PATIENT-LVL II: ICD-10-PCS | Mod: PBBFAC,,, | Performed by: OPHTHALMOLOGY

## 2021-07-13 PROCEDURE — 1159F MED LIST DOCD IN RCRD: CPT | Mod: S$GLB,,, | Performed by: OPHTHALMOLOGY

## 2021-07-13 PROCEDURE — 3288F FALL RISK ASSESSMENT DOCD: CPT | Mod: CPTII,S$GLB,, | Performed by: NURSE PRACTITIONER

## 2021-07-13 PROCEDURE — 99213 OFFICE O/P EST LOW 20 MIN: CPT | Mod: S$GLB,,, | Performed by: OPHTHALMOLOGY

## 2021-07-13 PROCEDURE — 3008F PR BODY MASS INDEX (BMI) DOCUMENTED: ICD-10-PCS | Mod: CPTII,S$GLB,, | Performed by: NURSE PRACTITIONER

## 2021-07-13 PROCEDURE — 99999 PR PBB SHADOW E&M-EST. PATIENT-LVL II: CPT | Mod: PBBFAC,,, | Performed by: OPHTHALMOLOGY

## 2021-07-13 PROCEDURE — 99999 PR PBB SHADOW E&M-EST. PATIENT-LVL IV: ICD-10-PCS | Mod: PBBFAC,,, | Performed by: NURSE PRACTITIONER

## 2021-07-13 PROCEDURE — 1101F PR PT FALLS ASSESS DOC 0-1 FALLS W/OUT INJ PAST YR: ICD-10-PCS | Mod: CPTII,S$GLB,, | Performed by: NURSE PRACTITIONER

## 2021-07-13 PROCEDURE — 1159F PR MEDICATION LIST DOCUMENTED IN MEDICAL RECORD: ICD-10-PCS | Mod: S$GLB,,, | Performed by: OPHTHALMOLOGY

## 2021-07-13 PROCEDURE — 88175 CYTOPATH C/V AUTO FLUID REDO: CPT | Performed by: NURSE PRACTITIONER

## 2021-07-13 PROCEDURE — 99999 PR PBB SHADOW E&M-EST. PATIENT-LVL IV: CPT | Mod: PBBFAC,,, | Performed by: NURSE PRACTITIONER

## 2021-07-13 PROCEDURE — 87624 HPV HI-RISK TYP POOLED RSLT: CPT | Performed by: NURSE PRACTITIONER

## 2021-07-13 PROCEDURE — 99387 PR PREVENTIVE VISIT,NEW,65 & OVER: ICD-10-PCS | Mod: S$GLB,,, | Performed by: NURSE PRACTITIONER

## 2021-07-13 PROCEDURE — 3008F BODY MASS INDEX DOCD: CPT | Mod: CPTII,S$GLB,, | Performed by: NURSE PRACTITIONER

## 2021-07-13 PROCEDURE — 99387 INIT PM E/M NEW PAT 65+ YRS: CPT | Mod: S$GLB,,, | Performed by: NURSE PRACTITIONER

## 2021-07-13 PROCEDURE — 1125F PR PAIN SEVERITY QUANTIFIED, PAIN PRESENT: ICD-10-PCS | Mod: S$GLB,,, | Performed by: NURSE PRACTITIONER

## 2021-07-13 PROCEDURE — 99213 PR OFFICE/OUTPT VISIT, EST, LEVL III, 20-29 MIN: ICD-10-PCS | Mod: S$GLB,,, | Performed by: OPHTHALMOLOGY

## 2021-07-13 RX ORDER — NITROFURANTOIN 25; 75 MG/1; MG/1
CAPSULE ORAL
COMMUNITY
Start: 2021-04-30 | End: 2021-07-13

## 2021-07-13 RX ORDER — PANTOPRAZOLE SODIUM 40 MG/1
1 TABLET, DELAYED RELEASE ORAL EVERY MORNING
COMMUNITY
Start: 2021-06-18

## 2021-07-13 RX ORDER — ERGOCALCIFEROL 1.25 MG/1
50000 CAPSULE ORAL
COMMUNITY
Start: 2020-10-12 | End: 2021-10-12

## 2021-07-13 RX ORDER — TAMSULOSIN HYDROCHLORIDE 0.4 MG/1
1 CAPSULE ORAL DAILY
COMMUNITY
Start: 2021-06-01

## 2021-07-13 RX ORDER — METOPROLOL SUCCINATE 25 MG/1
1 TABLET, EXTENDED RELEASE ORAL DAILY
COMMUNITY
Start: 2021-06-18 | End: 2023-04-03

## 2021-07-13 RX ORDER — TRAMADOL HYDROCHLORIDE 50 MG/1
TABLET ORAL
COMMUNITY
Start: 2021-06-18 | End: 2021-07-13

## 2021-07-19 LAB
FINAL PATHOLOGIC DIAGNOSIS: NORMAL
Lab: NORMAL

## 2021-07-23 LAB
HPV HR 12 DNA SPEC QL NAA+PROBE: NEGATIVE
HPV16 AG SPEC QL: NEGATIVE
HPV18 DNA SPEC QL NAA+PROBE: NEGATIVE

## 2021-08-17 ENCOUNTER — OFFICE VISIT (OUTPATIENT)
Dept: OPHTHALMOLOGY | Facility: CLINIC | Age: 66
End: 2021-08-17
Payer: COMMERCIAL

## 2021-08-17 DIAGNOSIS — Z91.199 NON-COMPLIANCE: ICD-10-CM

## 2021-08-17 DIAGNOSIS — H40.1123 PRIMARY OPEN ANGLE GLAUCOMA (POAG) OF LEFT EYE, SEVERE STAGE: Primary | ICD-10-CM

## 2021-08-17 DIAGNOSIS — H40.1111 PRIMARY OPEN ANGLE GLAUCOMA (POAG) OF RIGHT EYE, MILD STAGE: ICD-10-CM

## 2021-08-17 DIAGNOSIS — H04.129 DRY EYE: ICD-10-CM

## 2021-08-17 DIAGNOSIS — H52.7 REFRACTION DISORDER: ICD-10-CM

## 2021-08-17 DIAGNOSIS — H25.13 NUCLEAR SCLEROSIS, BILATERAL: ICD-10-CM

## 2021-08-17 PROCEDURE — 1159F MED LIST DOCD IN RCRD: CPT | Mod: CPTII,S$GLB,, | Performed by: OPHTHALMOLOGY

## 2021-08-17 PROCEDURE — 92015 PR REFRACTION: ICD-10-PCS | Mod: S$GLB,,, | Performed by: OPHTHALMOLOGY

## 2021-08-17 PROCEDURE — 1160F PR REVIEW ALL MEDS BY PRESCRIBER/CLIN PHARMACIST DOCUMENTED: ICD-10-PCS | Mod: CPTII,S$GLB,, | Performed by: OPHTHALMOLOGY

## 2021-08-17 PROCEDURE — 92015 DETERMINE REFRACTIVE STATE: CPT | Mod: S$GLB,,, | Performed by: OPHTHALMOLOGY

## 2021-08-17 PROCEDURE — 99999 PR PBB SHADOW E&M-EST. PATIENT-LVL III: ICD-10-PCS | Mod: PBBFAC,,, | Performed by: OPHTHALMOLOGY

## 2021-08-17 PROCEDURE — 99213 PR OFFICE/OUTPT VISIT, EST, LEVL III, 20-29 MIN: ICD-10-PCS | Mod: S$GLB,,, | Performed by: OPHTHALMOLOGY

## 2021-08-17 PROCEDURE — 99999 PR PBB SHADOW E&M-EST. PATIENT-LVL III: CPT | Mod: PBBFAC,,, | Performed by: OPHTHALMOLOGY

## 2021-08-17 PROCEDURE — 1159F PR MEDICATION LIST DOCUMENTED IN MEDICAL RECORD: ICD-10-PCS | Mod: CPTII,S$GLB,, | Performed by: OPHTHALMOLOGY

## 2021-08-17 PROCEDURE — 99213 OFFICE O/P EST LOW 20 MIN: CPT | Mod: S$GLB,,, | Performed by: OPHTHALMOLOGY

## 2021-08-17 PROCEDURE — 1160F RVW MEDS BY RX/DR IN RCRD: CPT | Mod: CPTII,S$GLB,, | Performed by: OPHTHALMOLOGY

## 2021-08-17 RX ORDER — TRAMADOL HYDROCHLORIDE 50 MG/1
50 TABLET ORAL 3 TIMES DAILY PRN
COMMUNITY
Start: 2021-06-18

## 2021-10-27 ENCOUNTER — OFFICE VISIT (OUTPATIENT)
Dept: OPHTHALMOLOGY | Facility: CLINIC | Age: 66
End: 2021-10-27
Payer: COMMERCIAL

## 2021-10-27 DIAGNOSIS — H40.1111 PRIMARY OPEN ANGLE GLAUCOMA (POAG) OF RIGHT EYE, MILD STAGE: Primary | ICD-10-CM

## 2021-10-27 DIAGNOSIS — H40.1123 PRIMARY OPEN ANGLE GLAUCOMA (POAG) OF LEFT EYE, SEVERE STAGE: ICD-10-CM

## 2021-10-27 DIAGNOSIS — H25.13 NUCLEAR SCLEROSIS, BILATERAL: ICD-10-CM

## 2021-10-27 DIAGNOSIS — H04.129 DRY EYE: ICD-10-CM

## 2021-10-27 DIAGNOSIS — H52.7 REFRACTIVE DISORDER: ICD-10-CM

## 2021-10-27 PROCEDURE — 1159F PR MEDICATION LIST DOCUMENTED IN MEDICAL RECORD: ICD-10-PCS | Mod: CPTII,S$GLB,, | Performed by: OPHTHALMOLOGY

## 2021-10-27 PROCEDURE — 99214 OFFICE O/P EST MOD 30 MIN: CPT | Mod: S$GLB,,, | Performed by: OPHTHALMOLOGY

## 2021-10-27 PROCEDURE — 99999 PR PBB SHADOW E&M-EST. PATIENT-LVL III: CPT | Mod: PBBFAC,,, | Performed by: OPHTHALMOLOGY

## 2021-10-27 PROCEDURE — 99214 PR OFFICE/OUTPT VISIT, EST, LEVL IV, 30-39 MIN: ICD-10-PCS | Mod: S$GLB,,, | Performed by: OPHTHALMOLOGY

## 2021-10-27 PROCEDURE — 92133 POSTERIOR SEGMENT OCT OPTIC NERVE(OCULAR COHERENCE TOMOGRAPHY) - OU - BOTH EYES: ICD-10-PCS | Mod: S$GLB,,, | Performed by: OPHTHALMOLOGY

## 2021-10-27 PROCEDURE — 92083 HUMPHREY VISUAL FIELD - OU - BOTH EYES: ICD-10-PCS | Mod: S$GLB,,, | Performed by: OPHTHALMOLOGY

## 2021-10-27 PROCEDURE — 1159F MED LIST DOCD IN RCRD: CPT | Mod: CPTII,S$GLB,, | Performed by: OPHTHALMOLOGY

## 2021-10-27 PROCEDURE — 99999 PR PBB SHADOW E&M-EST. PATIENT-LVL III: ICD-10-PCS | Mod: PBBFAC,,, | Performed by: OPHTHALMOLOGY

## 2021-10-27 PROCEDURE — 2023F PR DILATED RETINAL EXAM W/O EVID OF RETINOPATHY: ICD-10-PCS | Mod: CPTII,S$GLB,, | Performed by: OPHTHALMOLOGY

## 2021-10-27 PROCEDURE — 92083 EXTENDED VISUAL FIELD XM: CPT | Mod: S$GLB,,, | Performed by: OPHTHALMOLOGY

## 2021-10-27 PROCEDURE — 1160F RVW MEDS BY RX/DR IN RCRD: CPT | Mod: CPTII,S$GLB,, | Performed by: OPHTHALMOLOGY

## 2021-10-27 PROCEDURE — 1160F PR REVIEW ALL MEDS BY PRESCRIBER/CLIN PHARMACIST DOCUMENTED: ICD-10-PCS | Mod: CPTII,S$GLB,, | Performed by: OPHTHALMOLOGY

## 2021-10-27 PROCEDURE — 92133 CPTRZD OPH DX IMG PST SGM ON: CPT | Mod: S$GLB,,, | Performed by: OPHTHALMOLOGY

## 2021-10-27 PROCEDURE — 2023F DILAT RTA XM W/O RTNOPTHY: CPT | Mod: CPTII,S$GLB,, | Performed by: OPHTHALMOLOGY

## 2021-10-27 RX ORDER — DORZOLAMIDE HCL 20 MG/ML
1 SOLUTION/ DROPS OPHTHALMIC 2 TIMES DAILY
Qty: 12 ML | Refills: 3 | Status: SHIPPED | OUTPATIENT
Start: 2021-10-27 | End: 2022-03-09 | Stop reason: SDUPTHER

## 2021-10-27 RX ORDER — NETARSUDIL AND LATANOPROST OPHTHALMIC SOLUTION, 0.02%/0.005% .2; .05 MG/ML; MG/ML
1 SOLUTION/ DROPS OPHTHALMIC; TOPICAL DAILY
Qty: 2.5 ML | Refills: 3 | Status: SHIPPED | OUTPATIENT
Start: 2021-10-27 | End: 2022-03-09 | Stop reason: ALTCHOICE

## 2021-10-27 RX ORDER — BRIMONIDINE TARTRATE 2 MG/ML
1 SOLUTION/ DROPS OPHTHALMIC 2 TIMES DAILY
Qty: 12 ML | Refills: 3 | Status: SHIPPED | OUTPATIENT
Start: 2021-10-27 | End: 2022-12-29

## 2021-12-07 ENCOUNTER — OFFICE VISIT (OUTPATIENT)
Dept: OPHTHALMOLOGY | Facility: CLINIC | Age: 66
End: 2021-12-07
Payer: COMMERCIAL

## 2021-12-07 DIAGNOSIS — H40.1111 PRIMARY OPEN ANGLE GLAUCOMA (POAG) OF RIGHT EYE, MILD STAGE: ICD-10-CM

## 2021-12-07 DIAGNOSIS — Z91.199 NON-COMPLIANCE: ICD-10-CM

## 2021-12-07 DIAGNOSIS — H04.129 DRY EYE: ICD-10-CM

## 2021-12-07 DIAGNOSIS — H25.13 NUCLEAR SCLEROSIS, BILATERAL: ICD-10-CM

## 2021-12-07 DIAGNOSIS — H40.1123 PRIMARY OPEN ANGLE GLAUCOMA (POAG) OF LEFT EYE, SEVERE STAGE: Primary | ICD-10-CM

## 2021-12-07 PROCEDURE — 99214 OFFICE O/P EST MOD 30 MIN: CPT | Mod: S$GLB,,, | Performed by: OPHTHALMOLOGY

## 2021-12-07 PROCEDURE — 99999 PR PBB SHADOW E&M-EST. PATIENT-LVL III: CPT | Mod: PBBFAC,,, | Performed by: OPHTHALMOLOGY

## 2021-12-07 PROCEDURE — 99999 PR PBB SHADOW E&M-EST. PATIENT-LVL III: ICD-10-PCS | Mod: PBBFAC,,, | Performed by: OPHTHALMOLOGY

## 2021-12-07 PROCEDURE — 99214 PR OFFICE/OUTPT VISIT, EST, LEVL IV, 30-39 MIN: ICD-10-PCS | Mod: S$GLB,,, | Performed by: OPHTHALMOLOGY

## 2022-01-07 ENCOUNTER — TELEPHONE (OUTPATIENT)
Dept: OPHTHALMOLOGY | Facility: CLINIC | Age: 67
End: 2022-01-07
Payer: COMMERCIAL

## 2022-01-07 NOTE — TELEPHONE ENCOUNTER
Called patient to notify them of appointment change. left message on vm and made appt.  And she is  active on patient portal

## 2022-01-21 ENCOUNTER — PATIENT MESSAGE (OUTPATIENT)
Dept: OPHTHALMOLOGY | Facility: CLINIC | Age: 67
End: 2022-01-21
Payer: COMMERCIAL

## 2022-01-21 RX ORDER — BETAXOLOL HYDROCHLORIDE 5 MG/ML
1 SOLUTION/ DROPS OPHTHALMIC 2 TIMES DAILY
Qty: 10 ML | Refills: 4 | Status: SHIPPED | OUTPATIENT
Start: 2022-01-21 | End: 2022-06-29

## 2022-02-08 ENCOUNTER — OFFICE VISIT (OUTPATIENT)
Dept: OPHTHALMOLOGY | Facility: CLINIC | Age: 67
End: 2022-02-08
Payer: COMMERCIAL

## 2022-02-08 DIAGNOSIS — Z91.199 NON-COMPLIANCE: ICD-10-CM

## 2022-02-08 DIAGNOSIS — H25.13 NUCLEAR SCLEROSIS, BILATERAL: ICD-10-CM

## 2022-02-08 DIAGNOSIS — H40.1123 PRIMARY OPEN ANGLE GLAUCOMA (POAG) OF LEFT EYE, SEVERE STAGE: ICD-10-CM

## 2022-02-08 DIAGNOSIS — H40.1111 PRIMARY OPEN ANGLE GLAUCOMA (POAG) OF RIGHT EYE, MILD STAGE: Primary | ICD-10-CM

## 2022-02-08 DIAGNOSIS — H04.129 DRY EYE: ICD-10-CM

## 2022-02-08 PROCEDURE — 1159F PR MEDICATION LIST DOCUMENTED IN MEDICAL RECORD: ICD-10-PCS | Mod: CPTII,S$GLB,, | Performed by: OPHTHALMOLOGY

## 2022-02-08 PROCEDURE — 99214 OFFICE O/P EST MOD 30 MIN: CPT | Mod: S$GLB,,, | Performed by: OPHTHALMOLOGY

## 2022-02-08 PROCEDURE — 99214 PR OFFICE/OUTPT VISIT, EST, LEVL IV, 30-39 MIN: ICD-10-PCS | Mod: S$GLB,,, | Performed by: OPHTHALMOLOGY

## 2022-02-08 PROCEDURE — 99999 PR PBB SHADOW E&M-EST. PATIENT-LVL III: CPT | Mod: PBBFAC,,, | Performed by: OPHTHALMOLOGY

## 2022-02-08 PROCEDURE — 1160F RVW MEDS BY RX/DR IN RCRD: CPT | Mod: CPTII,S$GLB,, | Performed by: OPHTHALMOLOGY

## 2022-02-08 PROCEDURE — 1160F PR REVIEW ALL MEDS BY PRESCRIBER/CLIN PHARMACIST DOCUMENTED: ICD-10-PCS | Mod: CPTII,S$GLB,, | Performed by: OPHTHALMOLOGY

## 2022-02-08 PROCEDURE — 99999 PR PBB SHADOW E&M-EST. PATIENT-LVL III: ICD-10-PCS | Mod: PBBFAC,,, | Performed by: OPHTHALMOLOGY

## 2022-02-08 PROCEDURE — 1159F MED LIST DOCD IN RCRD: CPT | Mod: CPTII,S$GLB,, | Performed by: OPHTHALMOLOGY

## 2022-02-08 RX ORDER — BIMATOPROST 0.1 MG/ML
1 SOLUTION/ DROPS OPHTHALMIC NIGHTLY
Qty: 2 ML | Refills: 11 | Status: SHIPPED | OUTPATIENT
Start: 2022-02-08 | End: 2022-07-27 | Stop reason: ALTCHOICE

## 2022-02-08 NOTE — PROGRESS NOTES
HPI     One month glaucoma check (IOP check and consider SLT OS)    Patient states that the nerve in her eyelids jump since she started the   Rocklatan. Patient states that both of her eyes are red.    Primary open angle glaucoma (POAG) of left eye, severe stage   Primary open angle glaucoma (POAG) of right eye, mild stage   Nuclear sclerosis, bilateral   Myopia with astigmatism and presbyopia, bilateral   GCL: 26/20 19/2021    Rocklatan OU QHS   Dorzolamide BID OU   Brimonidine OS TID   Betaxalol BID OS  Genteal Gel QHS OU    Last edited by Faina Ibrahim MA on 2/8/2022  8:50 AM. (History)        ROS     Positive for: Eyes    Negative for: Constitutional, Gastrointestinal, Neurological, Skin,   Genitourinary, Musculoskeletal, HENT, Endocrine, Cardiovascular,   Respiratory, Psychiatric, Allergic/Imm, Heme/Lymph    Last edited by Noble Guillory MD on 2/8/2022  9:02 AM. (History)        Assessment /Plan     For exam results, see Encounter Report.      ICD-10-CM ICD-9-CM    1. Primary open angle glaucoma (POAG) of right eye, mild stage  H40.1111 365.11 IOP OU remains above target despite Rocklatan use. Will d/c at this time, and begin Lumigan qd OU.     IOP not within acceptable range relative to target IOP with risk of irreversible visual loss. Additional treatment required.  Discussed options, risks, and benefits of additional medication, SLT laser, or incisional glaucoma surgery.     Recommend Lumigan vs. SLT    Patient defers SLT at this time, and wishes to begin Lumigan    Reviewed importance of continued compliance with treatment and follow up.      365.71    2. Primary open angle glaucoma (POAG) of left eye, severe stage  H40.1123 365.11 See above.     365.73    3. Dry eye  H04.129 375.15 Findings and symptoms consistent with mild dry eyes.   Recommend regular use of Artificial Tears. These should be thought of as Ocular Surface Moisturizers similar to skin moisturizers in that regular use is required to  achieve maximum benefit.  Specifically, I recommend the following:    Systane Ultra or Refresh Optive Ever 3 : 3-4 times a day.   The first dose upon awakening is most important because we do not make tears at night.  Avoid generic products as they contain Benzalkonium Chloride as a preservative. This is a very irritating chemical and can make your eyes worse.   4. Nuclear sclerosis, bilateral  H25.13 366.16 You were found to have an early cataract in your eye(s) today, however the cataract is not affecting your activities of daily living, such as reading and driving.You do not need  surgery at this time. We will recheck your cataract at your next visit. You are welcome to call for an earlier appointment if your vision gets worse.    5. Non-compliance  Z91.19 V15.81 I have discussed with her the great importance of following the treatment plan exactly as directed in order to achieve a good medical outcome. Discussed in detail the appropriate drop application.    Patient understands verbally.         Return to clinic 3-4 weeks for IOP check on Lumigan trial           Lumigan OU QHS   Dorzolamide BID OU   Brimonidine OS TID   Betaxalol BID OS  Genteal Gel QHS OU

## 2022-03-09 ENCOUNTER — OFFICE VISIT (OUTPATIENT)
Dept: OPHTHALMOLOGY | Facility: CLINIC | Age: 67
End: 2022-03-09
Payer: COMMERCIAL

## 2022-03-09 DIAGNOSIS — H04.129 DRY EYE: ICD-10-CM

## 2022-03-09 DIAGNOSIS — H40.1111 PRIMARY OPEN ANGLE GLAUCOMA (POAG) OF RIGHT EYE, MILD STAGE: ICD-10-CM

## 2022-03-09 DIAGNOSIS — H25.13 NUCLEAR SCLEROSIS, BILATERAL: ICD-10-CM

## 2022-03-09 DIAGNOSIS — Z91.199 NON-COMPLIANCE: ICD-10-CM

## 2022-03-09 DIAGNOSIS — H40.1123 PRIMARY OPEN ANGLE GLAUCOMA (POAG) OF LEFT EYE, SEVERE STAGE: Primary | ICD-10-CM

## 2022-03-09 PROCEDURE — 1160F PR REVIEW ALL MEDS BY PRESCRIBER/CLIN PHARMACIST DOCUMENTED: ICD-10-PCS | Mod: CPTII,S$GLB,, | Performed by: OPHTHALMOLOGY

## 2022-03-09 PROCEDURE — 99214 OFFICE O/P EST MOD 30 MIN: CPT | Mod: S$GLB,,, | Performed by: OPHTHALMOLOGY

## 2022-03-09 PROCEDURE — 99214 PR OFFICE/OUTPT VISIT, EST, LEVL IV, 30-39 MIN: ICD-10-PCS | Mod: S$GLB,,, | Performed by: OPHTHALMOLOGY

## 2022-03-09 PROCEDURE — 1159F MED LIST DOCD IN RCRD: CPT | Mod: CPTII,S$GLB,, | Performed by: OPHTHALMOLOGY

## 2022-03-09 PROCEDURE — 1160F RVW MEDS BY RX/DR IN RCRD: CPT | Mod: CPTII,S$GLB,, | Performed by: OPHTHALMOLOGY

## 2022-03-09 PROCEDURE — 99999 PR PBB SHADOW E&M-EST. PATIENT-LVL III: CPT | Mod: PBBFAC,,, | Performed by: OPHTHALMOLOGY

## 2022-03-09 PROCEDURE — 1159F PR MEDICATION LIST DOCUMENTED IN MEDICAL RECORD: ICD-10-PCS | Mod: CPTII,S$GLB,, | Performed by: OPHTHALMOLOGY

## 2022-03-09 PROCEDURE — 99999 PR PBB SHADOW E&M-EST. PATIENT-LVL III: ICD-10-PCS | Mod: PBBFAC,,, | Performed by: OPHTHALMOLOGY

## 2022-03-09 NOTE — PROGRESS NOTES
HPI     Glaucoma     Comments: Pt here for 4 wk Lumigan trial. No pain or discomfort. VA   stable. 100% compliant with gtts.               Comments     Primary open angle glaucoma (POAG) of left eye, severe stage   Primary open angle glaucoma (POAG) of right eye, mild stage   Nuclear sclerosis, bilateral   Myopia with astigmatism and presbyopia, bilateral       GCL: 26/20 10/2021        Lumigan OU QHS   Dorzolamide BID OU   Brimonidine OS TID   Betaxalol BID OS  Genteal Gel QHS OU          Last edited by Urban Martin MA on 3/9/2022  7:46 AM. (History)          Dorzolamide BID OU   Brimonidine OS TID   Betaxalol BID OS  Genteal Gel QHS OU   Assessment /Plan     For exam results, see Encounter Report.      ICD-10-CM ICD-9-CM    1. Primary open angle glaucoma (POAG) of left eye, severe stage  H40.1123 365.11 IOP not within acceptable range relative to target IOP with risk of irreversible visual loss. Additional treatment required.  Discussed options, risks, and benefits of additional medication, SLT laser, or incisional glaucoma surgery.     recommend slt or surgery    Patient chooses meds only - discussed the risk of visual loss    Reviewed importance of continued compliance with treatment and follow up.        365.73    2. Primary open angle glaucoma (POAG) of right eye, mild stage  H40.1111 365.11 Would likely benefit from Slt -      365.71    3. Dry eye  H04.129 375.15    4. Nuclear sclerosis, bilateral  H25.13 366.16    5. Non-compliance  Z91.19 V15.81        Explained that patient will likely lose vision without surgery or laser - discussed that laser is not 100% affective.    Resume Latanoprost in place of Lumigan  Dorzolamide BID OU   Brimonidine OS TID   Betaxalol BID OS  Genteal Gel QHS OU     Return to clinic 2 months with Hvf

## 2022-07-01 ENCOUNTER — OFFICE VISIT (OUTPATIENT)
Dept: OPHTHALMOLOGY | Facility: CLINIC | Age: 67
End: 2022-07-01
Payer: COMMERCIAL

## 2022-07-01 DIAGNOSIS — H04.129 DRY EYE: ICD-10-CM

## 2022-07-01 DIAGNOSIS — Z91.199 NON-COMPLIANCE: ICD-10-CM

## 2022-07-01 DIAGNOSIS — H40.1111 PRIMARY OPEN ANGLE GLAUCOMA (POAG) OF RIGHT EYE, MILD STAGE: ICD-10-CM

## 2022-07-01 DIAGNOSIS — H25.13 NUCLEAR SCLEROSIS, BILATERAL: ICD-10-CM

## 2022-07-01 DIAGNOSIS — H40.1123 PRIMARY OPEN ANGLE GLAUCOMA (POAG) OF LEFT EYE, SEVERE STAGE: Primary | ICD-10-CM

## 2022-07-01 PROCEDURE — 1160F RVW MEDS BY RX/DR IN RCRD: CPT | Mod: CPTII,S$GLB,, | Performed by: OPHTHALMOLOGY

## 2022-07-01 PROCEDURE — 1159F PR MEDICATION LIST DOCUMENTED IN MEDICAL RECORD: ICD-10-PCS | Mod: CPTII,S$GLB,, | Performed by: OPHTHALMOLOGY

## 2022-07-01 PROCEDURE — 99999 PR PBB SHADOW E&M-EST. PATIENT-LVL II: ICD-10-PCS | Mod: PBBFAC,,, | Performed by: OPHTHALMOLOGY

## 2022-07-01 PROCEDURE — 2023F PR DILATED RETINAL EXAM W/O EVID OF RETINOPATHY: ICD-10-PCS | Mod: CPTII,S$GLB,, | Performed by: OPHTHALMOLOGY

## 2022-07-01 PROCEDURE — 2023F DILAT RTA XM W/O RTNOPTHY: CPT | Mod: CPTII,S$GLB,, | Performed by: OPHTHALMOLOGY

## 2022-07-01 PROCEDURE — 92083 EXTENDED VISUAL FIELD XM: CPT | Mod: S$GLB,,, | Performed by: OPHTHALMOLOGY

## 2022-07-01 PROCEDURE — 1160F PR REVIEW ALL MEDS BY PRESCRIBER/CLIN PHARMACIST DOCUMENTED: ICD-10-PCS | Mod: CPTII,S$GLB,, | Performed by: OPHTHALMOLOGY

## 2022-07-01 PROCEDURE — 92083 HUMPHREY VISUAL FIELD - OU - BOTH EYES: ICD-10-PCS | Mod: S$GLB,,, | Performed by: OPHTHALMOLOGY

## 2022-07-01 PROCEDURE — 99214 OFFICE O/P EST MOD 30 MIN: CPT | Mod: S$GLB,,, | Performed by: OPHTHALMOLOGY

## 2022-07-01 PROCEDURE — 99999 PR PBB SHADOW E&M-EST. PATIENT-LVL II: CPT | Mod: PBBFAC,,, | Performed by: OPHTHALMOLOGY

## 2022-07-01 PROCEDURE — 99214 PR OFFICE/OUTPT VISIT, EST, LEVL IV, 30-39 MIN: ICD-10-PCS | Mod: S$GLB,,, | Performed by: OPHTHALMOLOGY

## 2022-07-01 PROCEDURE — 1159F MED LIST DOCD IN RCRD: CPT | Mod: CPTII,S$GLB,, | Performed by: OPHTHALMOLOGY

## 2022-07-01 RX ORDER — NEPAFENAC 3 MG/ML
1 SUSPENSION/ DROPS OPHTHALMIC DAILY
Qty: 3 ML | Refills: 0 | Status: SHIPPED | OUTPATIENT
Start: 2022-07-01 | End: 2022-07-06

## 2022-07-20 DIAGNOSIS — Z76.89 ESTABLISHING CARE WITH NEW DOCTOR, ENCOUNTER FOR: Primary | ICD-10-CM

## 2022-07-25 ENCOUNTER — HOSPITAL ENCOUNTER (OUTPATIENT)
Dept: CARDIOLOGY | Facility: HOSPITAL | Age: 67
Discharge: HOME | End: 2022-07-25
Attending: INTERNAL MEDICINE
Payer: COMMERCIAL

## 2022-07-25 ENCOUNTER — OFFICE VISIT (OUTPATIENT)
Dept: CARDIOLOGY | Facility: CLINIC | Age: 67
End: 2022-07-25
Payer: COMMERCIAL

## 2022-07-25 ENCOUNTER — LAB VISIT (OUTPATIENT)
Dept: LAB | Facility: HOSPITAL | Age: 67
End: 2022-07-25
Attending: INTERNAL MEDICINE
Payer: COMMERCIAL

## 2022-07-25 VITALS
SYSTOLIC BLOOD PRESSURE: 144 MMHG | DIASTOLIC BLOOD PRESSURE: 82 MMHG | HEART RATE: 73 BPM | HEIGHT: 68 IN | OXYGEN SATURATION: 94 % | RESPIRATION RATE: 16 BRPM | WEIGHT: 260 LBS | BODY MASS INDEX: 39.4 KG/M2

## 2022-07-25 DIAGNOSIS — R07.9 CHEST PAIN, UNSPECIFIED TYPE: ICD-10-CM

## 2022-07-25 DIAGNOSIS — J45.909 ASTHMA, UNSPECIFIED ASTHMA SEVERITY, UNSPECIFIED WHETHER COMPLICATED, UNSPECIFIED WHETHER PERSISTENT: ICD-10-CM

## 2022-07-25 DIAGNOSIS — Z95.5 S/P CORONARY ARTERY STENT PLACEMENT: ICD-10-CM

## 2022-07-25 DIAGNOSIS — R06.09 DOE (DYSPNEA ON EXERTION): ICD-10-CM

## 2022-07-25 DIAGNOSIS — R07.89 ATYPICAL CHEST PAIN: Primary | ICD-10-CM

## 2022-07-25 DIAGNOSIS — E78.5 HYPERLIPIDEMIA, UNSPECIFIED HYPERLIPIDEMIA TYPE: ICD-10-CM

## 2022-07-25 DIAGNOSIS — I10 ESSENTIAL HYPERTENSION: ICD-10-CM

## 2022-07-25 DIAGNOSIS — Z76.89 ESTABLISHING CARE WITH NEW DOCTOR, ENCOUNTER FOR: ICD-10-CM

## 2022-07-25 DIAGNOSIS — M19.90 ARTHRITIS: ICD-10-CM

## 2022-07-25 DIAGNOSIS — E66.01 MORBID OBESITY WITH BMI OF 40.0-44.9, ADULT: ICD-10-CM

## 2022-07-25 LAB — BNP SERPL-MCNC: 89 PG/ML (ref 0–99)

## 2022-07-25 PROCEDURE — 1101F PR PT FALLS ASSESS DOC 0-1 FALLS W/OUT INJ PAST YR: ICD-10-PCS | Mod: CPTII,S$GLB,, | Performed by: INTERNAL MEDICINE

## 2022-07-25 PROCEDURE — 1101F PT FALLS ASSESS-DOCD LE1/YR: CPT | Mod: CPTII,S$GLB,, | Performed by: INTERNAL MEDICINE

## 2022-07-25 PROCEDURE — 99999 PR PBB SHADOW E&M-EST. PATIENT-LVL IV: CPT | Mod: PBBFAC,,, | Performed by: INTERNAL MEDICINE

## 2022-07-25 PROCEDURE — 1159F MED LIST DOCD IN RCRD: CPT | Mod: CPTII,S$GLB,, | Performed by: INTERNAL MEDICINE

## 2022-07-25 PROCEDURE — 99999 PR PBB SHADOW E&M-EST. PATIENT-LVL IV: ICD-10-PCS | Mod: PBBFAC,,, | Performed by: INTERNAL MEDICINE

## 2022-07-25 PROCEDURE — 83880 ASSAY OF NATRIURETIC PEPTIDE: CPT | Performed by: INTERNAL MEDICINE

## 2022-07-25 PROCEDURE — 1126F AMNT PAIN NOTED NONE PRSNT: CPT | Mod: CPTII,S$GLB,, | Performed by: INTERNAL MEDICINE

## 2022-07-25 PROCEDURE — 3079F DIAST BP 80-89 MM HG: CPT | Mod: CPTII,S$GLB,, | Performed by: INTERNAL MEDICINE

## 2022-07-25 PROCEDURE — 3008F PR BODY MASS INDEX (BMI) DOCUMENTED: ICD-10-PCS | Mod: CPTII,S$GLB,, | Performed by: INTERNAL MEDICINE

## 2022-07-25 PROCEDURE — 1159F PR MEDICATION LIST DOCUMENTED IN MEDICAL RECORD: ICD-10-PCS | Mod: CPTII,S$GLB,, | Performed by: INTERNAL MEDICINE

## 2022-07-25 PROCEDURE — 3079F PR MOST RECENT DIASTOLIC BLOOD PRESSURE 80-89 MM HG: ICD-10-PCS | Mod: CPTII,S$GLB,, | Performed by: INTERNAL MEDICINE

## 2022-07-25 PROCEDURE — 99204 PR OFFICE/OUTPT VISIT, NEW, LEVL IV, 45-59 MIN: ICD-10-PCS | Mod: S$GLB,,, | Performed by: INTERNAL MEDICINE

## 2022-07-25 PROCEDURE — 93005 ELECTROCARDIOGRAM TRACING: CPT

## 2022-07-25 PROCEDURE — 3077F SYST BP >= 140 MM HG: CPT | Mod: CPTII,S$GLB,, | Performed by: INTERNAL MEDICINE

## 2022-07-25 PROCEDURE — 3288F FALL RISK ASSESSMENT DOCD: CPT | Mod: CPTII,S$GLB,, | Performed by: INTERNAL MEDICINE

## 2022-07-25 PROCEDURE — 3008F BODY MASS INDEX DOCD: CPT | Mod: CPTII,S$GLB,, | Performed by: INTERNAL MEDICINE

## 2022-07-25 PROCEDURE — 3077F PR MOST RECENT SYSTOLIC BLOOD PRESSURE >= 140 MM HG: ICD-10-PCS | Mod: CPTII,S$GLB,, | Performed by: INTERNAL MEDICINE

## 2022-07-25 PROCEDURE — 36415 COLL VENOUS BLD VENIPUNCTURE: CPT | Performed by: INTERNAL MEDICINE

## 2022-07-25 PROCEDURE — 93010 EKG 12-LEAD: ICD-10-PCS | Mod: ,,, | Performed by: STUDENT IN AN ORGANIZED HEALTH CARE EDUCATION/TRAINING PROGRAM

## 2022-07-25 PROCEDURE — 1126F PR PAIN SEVERITY QUANTIFIED, NO PAIN PRESENT: ICD-10-PCS | Mod: CPTII,S$GLB,, | Performed by: INTERNAL MEDICINE

## 2022-07-25 PROCEDURE — 3288F PR FALLS RISK ASSESSMENT DOCUMENTED: ICD-10-PCS | Mod: CPTII,S$GLB,, | Performed by: INTERNAL MEDICINE

## 2022-07-25 PROCEDURE — 99204 OFFICE O/P NEW MOD 45 MIN: CPT | Mod: S$GLB,,, | Performed by: INTERNAL MEDICINE

## 2022-07-25 PROCEDURE — 93010 ELECTROCARDIOGRAM REPORT: CPT | Mod: ,,, | Performed by: STUDENT IN AN ORGANIZED HEALTH CARE EDUCATION/TRAINING PROGRAM

## 2022-07-25 RX ORDER — ISOSORBIDE MONONITRATE 60 MG/1
60 TABLET, EXTENDED RELEASE ORAL DAILY
Qty: 30 TABLET | Refills: 11 | Status: SHIPPED | OUTPATIENT
Start: 2022-07-25 | End: 2023-11-27 | Stop reason: SDUPTHER

## 2022-07-25 NOTE — PROGRESS NOTES
"  Subjective:   Patient ID:  Kate Lira is a 66 y.o. female who presents for evaluation of No chief complaint on file.      HPI  67 yo female, ostial LAD stent in  with Dr Bateman, no other residual lesions   Had a normal nuclear stress in 2019   Review of her last office visit, mention of sticking underneath the left breast chest pain followed by normal nuc in 2019   Today still complains of moderate chest pain, described as pressure and sharp, mostly for the last week, sometimes " sticks me" , but can be worse with exertion , radiates toward her left shoulder, it was mildy reproducible with lifting arm against resistance  She also complains of THAKKAR NYHA 1 ,  no leg swelling or orthopnea  Denies snoring , or daytime sleepiness   BP mildly elevated today   She does also have asthma but denies any attacks or wheezing     Past Medical History:   Diagnosis Date    Arthritis     Asthma     Glaucoma     Hypertension        Past Surgical History:   Procedure Laterality Date    CHOLECYSTECTOMY      CORONARY ANGIOPLASTY  2015    INJECTION OF ANESTHETIC AGENT AROUND MEDIAL BRANCH NERVES INNERVATING LUMBAR FACET JOINT Bilateral 2020    Procedure: Bilateral L3-5 MBB with local;  Surgeon: Roger Bhandari MD;  [facility];  Service: Pain Management;  Laterality: Bilateral;    KNEE SURGERY      left    TUBAL LIGATION         Social History     Tobacco Use    Smoking status: Never Smoker    Smokeless tobacco: Never Used   Substance Use Topics    Alcohol use: No    Drug use: No       Family History   Problem Relation Age of Onset    Hypertension Mother     Heart disease Mother     Heart attack Mother 64        MI    Diabetes Father     Osteoarthritis Sister     Heart disease Sister     Cancer Sister         1  sister had cervical ca    Osteoarthritis Brother     Heart disease Brother        Review of Systems   Constitutional: Negative for fever and malaise/fatigue. "   HENT: Negative for sore throat.    Eyes: Negative for blurred vision.   Cardiovascular: Positive for chest pain and dyspnea on exertion. Negative for claudication, cyanosis, irregular heartbeat, leg swelling, near-syncope, orthopnea, palpitations, paroxysmal nocturnal dyspnea and syncope.   Respiratory: Negative for cough and hemoptysis.    Hematologic/Lymphatic: Negative for bleeding problem.   Skin: Negative for rash.   Musculoskeletal: Negative for falls.   Gastrointestinal: Negative for abdominal pain.   Genitourinary: Negative.    Neurological: Negative.    Psychiatric/Behavioral: Negative for altered mental status and substance abuse.       Current Outpatient Medications on File Prior to Visit   Medication Sig    acetaminophen (TYLENOL) 650 MG TbSR Take 650 mg by mouth every 8 (eight) hours.    albuterol (PROVENTIL HFA) 90 mcg/actuation inhaler Inhale 2 puffs into the lungs every 4 (four) hours as needed for Wheezing. 2 puffs two times daily    aspirin (ECOTRIN) 81 MG EC tablet Take 1 tablet (81 mg total) by mouth once daily.    atorvastatin (LIPITOR) 20 MG tablet Take 1 tablet (20 mg total) by mouth every evening.    betaxolol 0.5% (BETOPTIC-S) 0.5 % Drop INSTILL 1 DROP IN LEFT EYE TWICE DAILY    bimatoprost (LUMIGAN) 0.01 % Drop Place 1 drop into both eyes every evening.    brimonidine 0.2% (ALPHAGAN) 0.2 % Drop INSTILL 1 DROP IN LEFT EYE THREE TIMES DAILY    brimonidine 0.2% (ALPHAGAN) 0.2 % Drop Place 1 drop into both eyes 2 (two) times a day.    carboxymethylcell/hypromellose (GENTEAL GEL OPHT) Apply to eye.    diclofenac sodium (VOLTAREN) 1 % Gel Apply topically.    dorzolamide (TRUSOPT) 2 % ophthalmic solution INSTILL 1 DROP IN BOTH EYES TWICE DAILY    fluticasone furoate-vilanterol (BREO) 100-25 mcg/dose diskus inhaler Inhale 1 puff into the lungs.    hydrochlorothiazide (HYDRODIURIL) 25 MG tablet TAKE ONE TABLET BY MOUTH EVERY DAY    hydrOXYzine (VISTARIL) 100 MG capsule Take 1 capsule  by mouth 2 (two) times daily as needed.    latanoprost 0.005 % ophthalmic solution INSTILL 1 DROP IN BOTH EYES EVERY EVENING    metoprolol succinate (TOPROL-XL) 25 MG 24 hr tablet Take 1 tablet by mouth once daily.    nitroGLYCERIN (NITROSTAT) 0.4 MG SL tablet Place 1 tablet (0.4 mg total) under the tongue every 5 (five) minutes as needed for Chest pain.    oxybutynin (DITROPAN XL) 15 MG TR24 Take 5 mg by mouth once daily.    pantoprazole (PROTONIX) 40 MG tablet Take 1 tablet by mouth every morning.    tamsulosin (FLOMAX) 0.4 mg Cap Take 1 capsule by mouth once daily.    vitamin D (VITAMIN D3) 1000 units Tab Take by mouth.    [DISCONTINUED] isosorbide mononitrate (IMDUR) 30 MG 24 hr tablet Take 1 tablet (30 mg total) by mouth once daily.    traMADoL (ULTRAM) 50 mg tablet Take 50 mg by mouth 3 (three) times daily as needed.     No current facility-administered medications on file prior to visit.       Objective:   Objective:  Wt Readings from Last 3 Encounters:   07/25/22 117.9 kg (260 lb)   07/13/21 116.6 kg (257 lb 0.9 oz)   07/18/20 116.3 kg (256 lb 6.3 oz)     Temp Readings from Last 3 Encounters:   07/18/20 98.2 °F (36.8 °C)   05/25/20 98.4 °F (36.9 °C) (Temporal)   12/07/16 97.9 °F (36.6 °C) (Oral)     BP Readings from Last 3 Encounters:   07/25/22 (!) 144/82   07/13/21 128/74   07/18/20 (!) 168/72     Pulse Readings from Last 3 Encounters:   07/25/22 73   07/18/20 (!) 53   07/09/20 73       Physical Exam  Vitals reviewed.   Constitutional:       Appearance: She is well-developed.   HENT:      Head: Normocephalic and atraumatic.   Eyes:      General: No scleral icterus.     Conjunctiva/sclera: Conjunctivae normal.   Cardiovascular:      Rate and Rhythm: Normal rate and regular rhythm.      Pulses: Intact distal pulses.      Heart sounds: Normal heart sounds. No murmur heard.  Pulmonary:      Effort: No respiratory distress.      Breath sounds: No wheezing or rales.   Chest:      Chest wall: No  tenderness.   Abdominal:      General: Bowel sounds are normal. There is no distension.      Palpations: Abdomen is soft.      Tenderness: There is no guarding.   Musculoskeletal:         General: Normal range of motion.      Cervical back: Normal range of motion and neck supple.   Skin:     General: Skin is warm.   Neurological:      Mental Status: She is alert and oriented to person, place, and time.         Lab Results   Component Value Date    CHOL 144 02/07/2017    CHOL 157 07/29/2016    CHOL 147 04/20/2015     Lab Results   Component Value Date    HDL 45 02/07/2017    HDL 48 07/29/2016    HDL 45 04/20/2015     Lab Results   Component Value Date    LDLCALC 89.4 02/07/2017    LDLCALC 98.8 07/29/2016    LDLCALC 92.0 04/20/2015     Lab Results   Component Value Date    TRIG 48 02/07/2017    TRIG 51 07/29/2016    TRIG 50 04/20/2015     Lab Results   Component Value Date    CHOLHDL 31.3 02/07/2017    CHOLHDL 30.6 07/29/2016    CHOLHDL 30.6 04/20/2015       Chemistry        Component Value Date/Time     07/18/2020 1425    K 4.4 07/18/2020 1425     07/18/2020 1425    CO2 31 (H) 07/18/2020 1425    BUN 19 07/18/2020 1425    CREATININE 1.6 (H) 07/18/2020 1425     07/18/2020 1425        Component Value Date/Time    CALCIUM 10.2 07/18/2020 1425    ALKPHOS 65 07/18/2020 1425    AST 23 07/18/2020 1425    ALT 22 07/18/2020 1425    BILITOT 0.7 07/18/2020 1425    ESTGFRAFRICA 39 (A) 07/18/2020 1425    EGFRNONAA 34 (A) 07/18/2020 1425          No results found for: TSH  Lab Results   Component Value Date    INR 1.1 12/07/2016    INR 1.0 01/28/2015     Lab Results   Component Value Date    WBC 5.86 07/18/2020    HGB 13.3 07/18/2020    HCT 41.9 07/18/2020    MCV 98 07/18/2020     07/18/2020     BNP  @LABRCNTIP(BNP,BNPTRIAGEBLO)@  CrCl cannot be calculated (Patient's most recent lab result is older than the maximum 7 days allowed.).     Imaging:  ======  No results found for this or any previous  visit.    No results found for this or any previous visit.    No results found for this or any previous visit.    Results for orders placed during the hospital encounter of 12/07/16    X-Ray Chest PA And Lateral    Narrative  EXAM: Chest X-ray PA and Lateral    CLINICAL HISTORY:     Chest pain, unspecified    COMPARISON STUDIES:     01/28/2015    FINDINGS:  Cardiomegaly.  Tortuous descending thoracic aorta.  Lungs appear clear.  Thoracic scoliosis convex to the right.. Ribs appear intact.  IMPRESSION:  No acute findings.  Please see above      Electronically signed by: WILLI MARTINEZ MD  Date:     12/07/16  Time:    08:02    No results found for this or any previous visit.    No valid procedures specified.    Diagnostic Results:  ECG: Reviewed  Nsr., lvh    The ASCVD Risk score (Martin NINO Jr., et al., 2013) failed to calculate for the following reasons:    Cannot find a previous HDL lab    Cannot find a previous total cholesterol lab    Assessment and Plan:   Atypical chest pain  -     Nuclear Stress - Cardiology Interpreted; Future    THAKKAR (dyspnea on exertion)  -     B-TYPE NATRIURETIC PEPTIDE; Future; Expected date: 07/25/2022    Chest pain, unspecified type  -     isosorbide mononitrate (IMDUR) 60 MG 24 hr tablet; Take 1 tablet (60 mg total) by mouth once daily.  Dispense: 30 tablet; Refill: 11    Establishing care with new doctor, encounter for    Hyperlipidemia, unspecified hyperlipidemia type    BMI 39.0-39.9,adult    S/P coronary artery stent placement    Morbid obesity with BMI of 40.0-44.9, adult    Asthma, unspecified asthma severity, unspecified whether complicated, unspecified whether persistent    Essential hypertension    Arthritis    cw asa, lipitor and toprol   Reviewed angiogram in 2015 with ostial LAD stent   Increase imdur , BP not at goal   CW hctz   Reviewed all tests and above medical conditions with patient in detail and formulated treatment plan.  Risk factor modification discussed.   Cardiac  low salt diet discussed.  Maintaining healthy weight and weight loss goals were discussed in clinic.  Atypical chest pain, however also has some component of angina equivalent with claire and ostial LAD stent in 2015   Discussed possible LHC should stress test evidence of coronary ischemia.      Follow up in 6 months

## 2022-07-26 ENCOUNTER — OUTSIDE PLACE OF SERVICE (OUTPATIENT)
Dept: OPHTHALMOLOGY | Facility: CLINIC | Age: 67
End: 2022-07-26
Payer: MEDICARE

## 2022-07-26 DIAGNOSIS — H40.1123 PRIMARY OPEN ANGLE GLAUCOMA (POAG) OF LEFT EYE, SEVERE STAGE: Primary | ICD-10-CM

## 2022-07-26 PROCEDURE — 65855 TRABECULOPLASTY LASER SURG: CPT | Mod: LT,,, | Performed by: OPHTHALMOLOGY

## 2022-07-26 PROCEDURE — 65855 PR TRABECULOPLASTY LASER SURGERY: ICD-10-PCS | Mod: LT,,, | Performed by: OPHTHALMOLOGY

## 2022-07-27 ENCOUNTER — OFFICE VISIT (OUTPATIENT)
Dept: OPHTHALMOLOGY | Facility: CLINIC | Age: 67
End: 2022-07-27
Payer: COMMERCIAL

## 2022-07-27 DIAGNOSIS — Z98.890 POST-OPERATIVE STATE: Primary | ICD-10-CM

## 2022-07-27 DIAGNOSIS — H40.1123 PRIMARY OPEN ANGLE GLAUCOMA (POAG) OF LEFT EYE, SEVERE STAGE: ICD-10-CM

## 2022-07-27 PROCEDURE — 99024 PR POST-OP FOLLOW-UP VISIT: ICD-10-PCS | Mod: S$GLB,,, | Performed by: OPHTHALMOLOGY

## 2022-07-27 PROCEDURE — 1160F RVW MEDS BY RX/DR IN RCRD: CPT | Mod: CPTII,S$GLB,, | Performed by: OPHTHALMOLOGY

## 2022-07-27 PROCEDURE — 1159F MED LIST DOCD IN RCRD: CPT | Mod: CPTII,S$GLB,, | Performed by: OPHTHALMOLOGY

## 2022-07-27 PROCEDURE — 1160F PR REVIEW ALL MEDS BY PRESCRIBER/CLIN PHARMACIST DOCUMENTED: ICD-10-PCS | Mod: CPTII,S$GLB,, | Performed by: OPHTHALMOLOGY

## 2022-07-27 PROCEDURE — 99024 POSTOP FOLLOW-UP VISIT: CPT | Mod: S$GLB,,, | Performed by: OPHTHALMOLOGY

## 2022-07-27 PROCEDURE — 1159F PR MEDICATION LIST DOCUMENTED IN MEDICAL RECORD: ICD-10-PCS | Mod: CPTII,S$GLB,, | Performed by: OPHTHALMOLOGY

## 2022-07-27 PROCEDURE — 99999 PR PBB SHADOW E&M-EST. PATIENT-LVL III: ICD-10-PCS | Mod: PBBFAC,,, | Performed by: OPHTHALMOLOGY

## 2022-07-27 PROCEDURE — 99999 PR PBB SHADOW E&M-EST. PATIENT-LVL III: CPT | Mod: PBBFAC,,, | Performed by: OPHTHALMOLOGY

## 2022-07-27 RX ORDER — LATANOPROST 50 UG/ML
1 SOLUTION/ DROPS OPHTHALMIC NIGHTLY
Qty: 2.5 ML | Refills: 11 | Status: SHIPPED | OUTPATIENT
Start: 2022-07-27 | End: 2022-07-29 | Stop reason: SDUPTHER

## 2022-07-27 RX ORDER — LATANOPROST 50 UG/ML
1 SOLUTION/ DROPS OPHTHALMIC NIGHTLY
Qty: 2.5 ML | Refills: 11 | Status: SHIPPED | OUTPATIENT
Start: 2022-07-27 | End: 2022-07-27 | Stop reason: SDUPTHER

## 2022-07-27 NOTE — PROGRESS NOTES
HPI     Post-op Evaluation     Comments: Pt reports for 1 day post op SLT OS. Denies any pain or   irritation. Va stable. 100% compliant with gtts.               Comments     Primary open angle glaucoma (POAG) of left eye, severe stage   Primary open angle glaucoma (POAG) of right eye, mild stage   Nuclear sclerosis, bilateral   Myopia with astigmatism and presbyopia, bilateral       GCL: 26/20 10/2021    Latanoprost more effective than Lumigan    Latanoprost OU QHS   Dorzolamide BID OU   Brimonidine OS TID   Betaxalol BID OS  Genteal Gel QHS OU            Last edited by Noble Krishnan on 7/27/2022  8:49 AM. (History)            Assessment /Plan     For exam results, see Encounter Report.      ICD-10-CM ICD-9-CM    1. Post-operative state  Z98.890 V45.89    2. Primary open angle glaucoma (POAG) of left eye, severe stage  H40.1123 365.11      365.73        S/p 1 day SLT OS  Doing well   Keto QID OS x 5 days      Return to clinic as scheduled in 3 weeks       Latanoprost OU QHS   Dorzolamide BID OU   Brimonidine OS TID   Betaxalol BID OS  Genteal Gel QHS OU

## 2022-07-29 DIAGNOSIS — H40.1111 PRIMARY OPEN ANGLE GLAUCOMA (POAG) OF RIGHT EYE, MILD STAGE: ICD-10-CM

## 2022-07-29 DIAGNOSIS — H40.1123 PRIMARY OPEN ANGLE GLAUCOMA (POAG) OF LEFT EYE, SEVERE STAGE: ICD-10-CM

## 2022-07-29 RX ORDER — LATANOPROST 50 UG/ML
1 SOLUTION/ DROPS OPHTHALMIC NIGHTLY
Qty: 2.5 ML | Refills: 11 | Status: SHIPPED | OUTPATIENT
Start: 2022-07-29 | End: 2023-07-20

## 2022-07-29 RX ORDER — BRIMONIDINE TARTRATE 2 MG/ML
SOLUTION/ DROPS OPHTHALMIC
Qty: 15 ML | Refills: 4 | Status: SHIPPED | OUTPATIENT
Start: 2022-07-29 | End: 2022-08-16 | Stop reason: SDUPTHER

## 2022-07-29 RX ORDER — DORZOLAMIDE HCL 20 MG/ML
1 SOLUTION/ DROPS OPHTHALMIC 2 TIMES DAILY
Qty: 10 ML | Refills: 6 | Status: SHIPPED | OUTPATIENT
Start: 2022-07-29 | End: 2023-03-06

## 2022-08-16 ENCOUNTER — OFFICE VISIT (OUTPATIENT)
Dept: OPHTHALMOLOGY | Facility: CLINIC | Age: 67
End: 2022-08-16
Payer: COMMERCIAL

## 2022-08-16 DIAGNOSIS — H04.129 DRY EYE: ICD-10-CM

## 2022-08-16 DIAGNOSIS — H40.1111 PRIMARY OPEN ANGLE GLAUCOMA (POAG) OF RIGHT EYE, MILD STAGE: ICD-10-CM

## 2022-08-16 DIAGNOSIS — H25.13 NUCLEAR SCLEROSIS, BILATERAL: ICD-10-CM

## 2022-08-16 DIAGNOSIS — H40.1123 PRIMARY OPEN ANGLE GLAUCOMA (POAG) OF LEFT EYE, SEVERE STAGE: Primary | ICD-10-CM

## 2022-08-16 PROCEDURE — 1159F PR MEDICATION LIST DOCUMENTED IN MEDICAL RECORD: ICD-10-PCS | Mod: CPTII,S$GLB,, | Performed by: OPHTHALMOLOGY

## 2022-08-16 PROCEDURE — 99999 PR PBB SHADOW E&M-EST. PATIENT-LVL III: CPT | Mod: PBBFAC,,, | Performed by: OPHTHALMOLOGY

## 2022-08-16 PROCEDURE — 1160F PR REVIEW ALL MEDS BY PRESCRIBER/CLIN PHARMACIST DOCUMENTED: ICD-10-PCS | Mod: CPTII,S$GLB,, | Performed by: OPHTHALMOLOGY

## 2022-08-16 PROCEDURE — 99214 PR OFFICE/OUTPT VISIT, EST, LEVL IV, 30-39 MIN: ICD-10-PCS | Mod: S$GLB,,, | Performed by: OPHTHALMOLOGY

## 2022-08-16 PROCEDURE — 1160F RVW MEDS BY RX/DR IN RCRD: CPT | Mod: CPTII,S$GLB,, | Performed by: OPHTHALMOLOGY

## 2022-08-16 PROCEDURE — 99999 PR PBB SHADOW E&M-EST. PATIENT-LVL III: ICD-10-PCS | Mod: PBBFAC,,, | Performed by: OPHTHALMOLOGY

## 2022-08-16 PROCEDURE — 99214 OFFICE O/P EST MOD 30 MIN: CPT | Mod: S$GLB,,, | Performed by: OPHTHALMOLOGY

## 2022-08-16 PROCEDURE — 1159F MED LIST DOCD IN RCRD: CPT | Mod: CPTII,S$GLB,, | Performed by: OPHTHALMOLOGY

## 2022-08-16 NOTE — PROGRESS NOTES
HPI     Glaucoma     Comments: 3 week IOP check    Patient states OU is doing well, no changes in VA and using all drops as   directed.              Comments     Primary open angle glaucoma (POAG) of left eye, severe stage   Primary open angle glaucoma (POAG) of right eye, mild stage   Nuclear sclerosis, bilateral   Myopia with astigmatism and presbyopia, bilateral   SLT OS 7/26/22    GCL: 26/20 10/2021    Latanoprost more effective than Lumigan    Latanoprost OU QHS   Dorzolamide BID OU   Brimonidine OS TID   Betaxalol BID OS  Genteal Gel QHS OU          Last edited by Alma Pettit, Patient Care Assistant on 8/16/2022  9:05   AM. (History)            Assessment /Plan     For exam results, see Encounter Report.      ICD-10-CM ICD-9-CM    1. Primary open angle glaucoma (POAG) of left eye, severe stage  H40.1123 365.11 iop stable at this time , yet will likely need sx in the future   Will watch IOP very close   Encourage compliance with meds and follow up      365.73    2. Primary open angle glaucoma (POAG) of right eye, mild stage  H40.1111 365.11 See above      365.71    3. Dry eye  H04.129 375.15 Stable    4. Nuclear sclerosis, bilateral  H25.13 366.16   You were found to have an early cataract in your eye(s) today, however the cataract is not affecting your activities of daily living, such as reading and driving.You do not need  surgery at this time. We will recheck your cataract at your next visit. You are welcome to call for an earlier appointment if your vision gets worse.          Return to clinic 2 months HVF, GOCT AND DOA     Latanoprost OU QHS   Dorzolamide BID OU   Brimonidine OS TID   Betaxalol BID OS  Genteal Gel QHS OU

## 2022-08-18 ENCOUNTER — HOSPITAL ENCOUNTER (OUTPATIENT)
Dept: RADIOLOGY | Facility: HOSPITAL | Age: 67
Discharge: HOME | End: 2022-08-18
Attending: INTERNAL MEDICINE
Payer: COMMERCIAL

## 2022-08-18 ENCOUNTER — HOSPITAL ENCOUNTER (OUTPATIENT)
Dept: CARDIOLOGY | Facility: HOSPITAL | Age: 67
Discharge: HOME | End: 2022-08-18
Attending: INTERNAL MEDICINE
Payer: MEDICARE

## 2022-08-18 DIAGNOSIS — R07.89 ATYPICAL CHEST PAIN: ICD-10-CM

## 2022-08-18 LAB
CV PHARM DOSE: 32.3 MG
CV STRESS BASE HR: 62 BPM
DIASTOLIC BLOOD PRESSURE: 76 MMHG
NUC REST EJECTION FRACTION: 64
NUC STRESS EJECTION FRACTION: 63 %
OHS CV CPX 1 MINUTE RECOVERY HEART RATE: 83 BPM
OHS CV CPX 85 PERCENT MAX PREDICTED HEART RATE MALE: 125
OHS CV CPX ESTIMATED METS: 0
OHS CV CPX MAX PREDICTED HEART RATE: 147
OHS CV CPX PATIENT IS FEMALE: 1
OHS CV CPX PATIENT IS MALE: 0
OHS CV CPX PEAK DIASTOLIC BLOOD PRESSURE: 69 MMHG
OHS CV CPX PEAK HEAR RATE: 84 BPM
OHS CV CPX PEAK RATE PRESSURE PRODUCT: NORMAL
OHS CV CPX PEAK SYSTOLIC BLOOD PRESSURE: 139 MMHG
OHS CV CPX PERCENT MAX PREDICTED HEART RATE ACHIEVED: 57
OHS CV CPX RATE PRESSURE PRODUCT PRESENTING: 8370
OHS CV PHARM TIME: 49 MIN
SYSTOLIC BLOOD PRESSURE: 135 MMHG

## 2022-08-18 PROCEDURE — 63600175 PHARM REV CODE 636 W HCPCS: Performed by: INTERNAL MEDICINE

## 2022-08-18 PROCEDURE — 93016 CV STRESS TEST SUPVJ ONLY: CPT | Mod: ,,, | Performed by: INTERNAL MEDICINE

## 2022-08-18 PROCEDURE — 93018 CV STRESS TEST I&R ONLY: CPT | Mod: ,,, | Performed by: INTERNAL MEDICINE

## 2022-08-18 PROCEDURE — 93017 CV STRESS TEST TRACING ONLY: CPT

## 2022-08-18 PROCEDURE — 78452 STRESS TEST WITH MYOCARDIAL PERFUSION (CUPID ONLY): ICD-10-PCS | Mod: 26,,, | Performed by: INTERNAL MEDICINE

## 2022-08-18 PROCEDURE — 78452 HT MUSCLE IMAGE SPECT MULT: CPT | Mod: 26,,, | Performed by: INTERNAL MEDICINE

## 2022-08-18 PROCEDURE — 93018 STRESS TEST WITH MYOCARDIAL PERFUSION (CUPID ONLY): ICD-10-PCS | Mod: ,,, | Performed by: INTERNAL MEDICINE

## 2022-08-18 PROCEDURE — 93016 STRESS TEST WITH MYOCARDIAL PERFUSION (CUPID ONLY): ICD-10-PCS | Mod: ,,, | Performed by: INTERNAL MEDICINE

## 2022-08-18 PROCEDURE — A9502 TC99M TETROFOSMIN: HCPCS

## 2022-08-18 RX ORDER — REGADENOSON 0.08 MG/ML
0.4 INJECTION, SOLUTION INTRAVENOUS ONCE
Status: COMPLETED | OUTPATIENT
Start: 2022-08-18 | End: 2022-08-18

## 2022-08-18 RX ADMIN — REGADENOSON 0.4 MG: 0.08 INJECTION, SOLUTION INTRAVENOUS at 09:08

## 2022-09-06 ENCOUNTER — TELEPHONE (OUTPATIENT)
Dept: CARDIOLOGY | Facility: CLINIC | Age: 67
End: 2022-09-06
Payer: MEDICARE

## 2022-09-06 DIAGNOSIS — Z95.5 S/P CORONARY ARTERY STENT PLACEMENT: ICD-10-CM

## 2022-09-06 DIAGNOSIS — I25.10 CORONARY ARTERY DISEASE, OCCLUSIVE: Chronic | ICD-10-CM

## 2022-09-06 DIAGNOSIS — R94.39 ABNORMAL STRESS TEST: Primary | ICD-10-CM

## 2022-09-06 DIAGNOSIS — R06.09 DOE (DYSPNEA ON EXERTION): ICD-10-CM

## 2022-09-06 NOTE — TELEPHONE ENCOUNTER
Called about Abnormal stress test with TID ratio of 1.56.    And no ischemia seen however transient ischemic dilation.    She still reports dyspnea with exertion and atypical chest pain.  She is on beta-blockers and Imdur.  History of ostial LAD stent 2015.    Will proceed with a right and left heart catheterization  Patient is agreeable.

## 2022-09-08 ENCOUNTER — LAB VISIT (OUTPATIENT)
Dept: LAB | Facility: HOSPITAL | Age: 67
End: 2022-09-08
Attending: INTERNAL MEDICINE
Payer: COMMERCIAL

## 2022-09-08 DIAGNOSIS — R06.09 DOE (DYSPNEA ON EXERTION): ICD-10-CM

## 2022-09-08 DIAGNOSIS — Z95.5 S/P CORONARY ARTERY STENT PLACEMENT: ICD-10-CM

## 2022-09-08 DIAGNOSIS — R94.39 ABNORMAL STRESS TEST: ICD-10-CM

## 2022-09-08 DIAGNOSIS — I25.10 CORONARY ARTERY DISEASE, OCCLUSIVE: Chronic | ICD-10-CM

## 2022-09-08 LAB
ANION GAP SERPL CALC-SCNC: 6 MMOL/L (ref 8–16)
APTT BLDCRRT: 26.9 SEC (ref 21–32)
BUN SERPL-MCNC: 23 MG/DL (ref 8–23)
CALCIUM SERPL-MCNC: 10.1 MG/DL (ref 8.7–10.5)
CHLORIDE SERPL-SCNC: 103 MMOL/L (ref 95–110)
CO2 SERPL-SCNC: 32 MMOL/L (ref 23–29)
CREAT SERPL-MCNC: 1.4 MG/DL (ref 0.5–1.4)
ERYTHROCYTE [DISTWIDTH] IN BLOOD BY AUTOMATED COUNT: 13.3 % (ref 11.5–14.5)
EST. GFR  (NO RACE VARIABLE): 41.2 ML/MIN/1.73 M^2
GLUCOSE SERPL-MCNC: 116 MG/DL (ref 70–110)
HCT VFR BLD AUTO: 39.7 % (ref 37–48.5)
HGB BLD-MCNC: 12.7 G/DL (ref 12–16)
INR PPP: 1 (ref 0.8–1.2)
MCH RBC QN AUTO: 30.9 PG (ref 27–31)
MCHC RBC AUTO-ENTMCNC: 32 G/DL (ref 32–36)
MCV RBC AUTO: 97 FL (ref 82–98)
PLATELET # BLD AUTO: 195 K/UL (ref 150–450)
PMV BLD AUTO: 10.9 FL (ref 9.2–12.9)
POTASSIUM SERPL-SCNC: 4.1 MMOL/L (ref 3.5–5.1)
PROTHROMBIN TIME: 10.7 SEC (ref 9–12.5)
RBC # BLD AUTO: 4.11 M/UL (ref 4–5.4)
SODIUM SERPL-SCNC: 141 MMOL/L (ref 136–145)
WBC # BLD AUTO: 5.7 K/UL (ref 3.9–12.7)

## 2022-09-08 PROCEDURE — 85730 THROMBOPLASTIN TIME PARTIAL: CPT | Performed by: INTERNAL MEDICINE

## 2022-09-08 PROCEDURE — 85610 PROTHROMBIN TIME: CPT | Performed by: INTERNAL MEDICINE

## 2022-09-08 PROCEDURE — 36415 COLL VENOUS BLD VENIPUNCTURE: CPT | Performed by: INTERNAL MEDICINE

## 2022-09-08 PROCEDURE — 80048 BASIC METABOLIC PNL TOTAL CA: CPT | Performed by: INTERNAL MEDICINE

## 2022-09-08 PROCEDURE — 85027 COMPLETE CBC AUTOMATED: CPT | Performed by: INTERNAL MEDICINE

## 2022-09-23 ENCOUNTER — NURSE TRIAGE (OUTPATIENT)
Dept: ADMINISTRATIVE | Facility: CLINIC | Age: 67
End: 2022-09-23
Payer: MEDICARE

## 2022-09-23 ENCOUNTER — HOSPITAL ENCOUNTER (OUTPATIENT)
Facility: HOSPITAL | Age: 67
Discharge: HOME | End: 2022-09-23
Attending: INTERNAL MEDICINE | Admitting: INTERNAL MEDICINE
Payer: MEDICARE

## 2022-09-23 DIAGNOSIS — Z95.820 S/P ANGIOPLASTY WITH STENT: ICD-10-CM

## 2022-09-23 DIAGNOSIS — I20.9 ANGINA PECTORIS: ICD-10-CM

## 2022-09-23 DIAGNOSIS — I11.9 CARDIOMYOPATHY DUE TO HYPERTENSION, WITHOUT HEART FAILURE: ICD-10-CM

## 2022-09-23 DIAGNOSIS — I43 CARDIOMYOPATHY DUE TO HYPERTENSION, WITHOUT HEART FAILURE: ICD-10-CM

## 2022-09-23 DIAGNOSIS — R06.09 DOE (DYSPNEA ON EXERTION): ICD-10-CM

## 2022-09-23 DIAGNOSIS — Z95.5 S/P CORONARY ARTERY STENT PLACEMENT: Primary | ICD-10-CM

## 2022-09-23 LAB
ALLENS TEST: ABNORMAL
DELSYS: ABNORMAL
HCO3 UR-SCNC: 28.6 MMOL/L (ref 24–28)
MODE: ABNORMAL
PCO2 BLDA: 48.5 MMHG (ref 35–45)
PH SMN: 7.38 [PH] (ref 7.35–7.45)
PO2 BLDA: 35 MMHG (ref 40–60)
POC BE: 3 MMOL/L
POC SATURATED O2: 64 % (ref 95–100)
SAMPLE: ABNORMAL
SITE: ABNORMAL

## 2022-09-23 PROCEDURE — 99152 MOD SED SAME PHYS/QHP 5/>YRS: CPT | Mod: ,,, | Performed by: INTERNAL MEDICINE

## 2022-09-23 PROCEDURE — 99152 MOD SED SAME PHYS/QHP 5/>YRS: CPT | Performed by: INTERNAL MEDICINE

## 2022-09-23 PROCEDURE — 99153 MOD SED SAME PHYS/QHP EA: CPT | Performed by: INTERNAL MEDICINE

## 2022-09-23 PROCEDURE — 93460 PR CATH PLACE/CORON ANGIO, IMG SUPER/INTERP,R&L HRT CATH, L HRT VENTRIC: ICD-10-PCS | Mod: 26,,, | Performed by: INTERNAL MEDICINE

## 2022-09-23 PROCEDURE — 25000003 PHARM REV CODE 250: Performed by: INTERNAL MEDICINE

## 2022-09-23 PROCEDURE — 25500020 PHARM REV CODE 255: Performed by: INTERNAL MEDICINE

## 2022-09-23 PROCEDURE — C1894 INTRO/SHEATH, NON-LASER: HCPCS | Performed by: INTERNAL MEDICINE

## 2022-09-23 PROCEDURE — 93460 R&L HRT ART/VENTRICLE ANGIO: CPT | Performed by: INTERNAL MEDICINE

## 2022-09-23 PROCEDURE — 82803 BLOOD GASES ANY COMBINATION: CPT

## 2022-09-23 PROCEDURE — 93460 R&L HRT ART/VENTRICLE ANGIO: CPT | Mod: 26,,, | Performed by: INTERNAL MEDICINE

## 2022-09-23 PROCEDURE — 99900035 HC TECH TIME PER 15 MIN (STAT)

## 2022-09-23 PROCEDURE — C1769 GUIDE WIRE: HCPCS | Performed by: INTERNAL MEDICINE

## 2022-09-23 PROCEDURE — 99152 PR MOD CONSCIOUS SEDATION, SAME PHYS, 5+ YRS, FIRST 15 MIN: ICD-10-PCS | Mod: ,,, | Performed by: INTERNAL MEDICINE

## 2022-09-23 PROCEDURE — C1751 CATH, INF, PER/CENT/MIDLINE: HCPCS | Performed by: INTERNAL MEDICINE

## 2022-09-23 PROCEDURE — 63600175 PHARM REV CODE 636 W HCPCS: Performed by: INTERNAL MEDICINE

## 2022-09-23 RX ORDER — ONDANSETRON 8 MG/1
8 TABLET, ORALLY DISINTEGRATING ORAL EVERY 8 HOURS PRN
Status: DISCONTINUED | OUTPATIENT
Start: 2022-09-23 | End: 2022-09-23 | Stop reason: HOSPADM

## 2022-09-23 RX ORDER — DIPHENHYDRAMINE HCL 50 MG
50 CAPSULE ORAL ONCE
Status: DISCONTINUED | OUTPATIENT
Start: 2022-09-23 | End: 2022-09-23 | Stop reason: HOSPADM

## 2022-09-23 RX ORDER — NAPROXEN SODIUM 220 MG/1
81 TABLET, FILM COATED ORAL ONCE
Status: COMPLETED | OUTPATIENT
Start: 2022-09-23 | End: 2022-09-23

## 2022-09-23 RX ORDER — FENTANYL CITRATE 50 UG/ML
INJECTION, SOLUTION INTRAMUSCULAR; INTRAVENOUS
Status: DISCONTINUED | OUTPATIENT
Start: 2022-09-23 | End: 2022-09-23 | Stop reason: HOSPADM

## 2022-09-23 RX ORDER — SODIUM CHLORIDE 9 MG/ML
INJECTION, SOLUTION INTRAVENOUS CONTINUOUS
Status: ACTIVE | OUTPATIENT
Start: 2022-09-23 | End: 2022-09-23

## 2022-09-23 RX ORDER — DIPHENHYDRAMINE HCL 50 MG
50 CAPSULE ORAL ONCE
Status: COMPLETED | OUTPATIENT
Start: 2022-09-23 | End: 2022-09-23

## 2022-09-23 RX ORDER — ACETAMINOPHEN 325 MG/1
650 TABLET ORAL EVERY 4 HOURS PRN
Status: DISCONTINUED | OUTPATIENT
Start: 2022-09-23 | End: 2022-09-23 | Stop reason: HOSPADM

## 2022-09-23 RX ORDER — NAPROXEN SODIUM 220 MG/1
81 TABLET, FILM COATED ORAL ONCE
Status: DISCONTINUED | OUTPATIENT
Start: 2022-09-23 | End: 2022-09-23 | Stop reason: HOSPADM

## 2022-09-23 RX ORDER — MIDAZOLAM HYDROCHLORIDE 1 MG/ML
INJECTION, SOLUTION INTRAMUSCULAR; INTRAVENOUS
Status: DISCONTINUED | OUTPATIENT
Start: 2022-09-23 | End: 2022-09-23 | Stop reason: HOSPADM

## 2022-09-23 RX ORDER — LIDOCAINE HYDROCHLORIDE 20 MG/ML
INJECTION, SOLUTION EPIDURAL; INFILTRATION; INTRACAUDAL; PERINEURAL
Status: DISCONTINUED | OUTPATIENT
Start: 2022-09-23 | End: 2022-09-23 | Stop reason: HOSPADM

## 2022-09-23 RX ORDER — SODIUM CHLORIDE 9 MG/ML
INJECTION, SOLUTION INTRAVENOUS CONTINUOUS
Status: DISCONTINUED | OUTPATIENT
Start: 2022-09-23 | End: 2022-09-23 | Stop reason: HOSPADM

## 2022-09-23 RX ADMIN — DIPHENHYDRAMINE HYDROCHLORIDE 50 MG: 50 CAPSULE ORAL at 08:09

## 2022-09-23 RX ADMIN — SODIUM CHLORIDE: 0.9 INJECTION, SOLUTION INTRAVENOUS at 08:09

## 2022-09-23 RX ADMIN — ASPIRIN 81 MG CHEWABLE TABLET 81 MG: 81 TABLET CHEWABLE at 08:09

## 2022-09-23 NOTE — Clinical Note
The right groin was prepped. The site was prepped with ChloraPrep. The site was clipped. The patient was draped. The patient was positioned supine.

## 2022-09-23 NOTE — H&P
"  Subjective:   Patient ID:  Kate Lira is a 66 y.o. female who presents for evaluation of No chief complaint on file.      HPI  9.23.2022  R/lhc   , abnormal stress test with LV dilation     Results for orders placed during the hospital encounter of 08/18/22    Nuclear Stress - Cardiology Interpreted    Interpretation Summary    Normal myocardial perfusion scan. There is no evidence of myocardial ischemia or infarction.    The gated perfusion images showed an ejection fraction of 64% at rest. The gated perfusion images showed an ejection fraction of 63% post stress.    The EKG portion of this study is negative for ischemia.    The patient reported no chest pain during the stress test.    T.i.d. ratio of 1.56    8.2022  65 yo female, ostial LAD stent in 2015 with Dr Bateman, no other residual lesions   Had a normal nuclear stress in 2019   Review of her last office visit, mention of sticking underneath the left breast chest pain followed by normal nuc in 2019   Today still complains of moderate chest pain, described as pressure and sharp, mostly for the last week, sometimes " sticks me" , but can be worse with exertion , radiates toward her left shoulder, it was mildy reproducible with lifting arm against resistance  She also complains of THAKKAR NYHA 1 ,  no leg swelling or orthopnea  Denies snoring , or daytime sleepiness   BP mildly elevated today   She does also have asthma but denies any attacks or wheezing     Past Medical History:   Diagnosis Date    Arthritis     Asthma     Glaucoma     Hypertension        Past Surgical History:   Procedure Laterality Date    CHOLECYSTECTOMY      CORONARY ANGIOPLASTY  02/2015    INJECTION OF ANESTHETIC AGENT AROUND MEDIAL BRANCH NERVES INNERVATING LUMBAR FACET JOINT Bilateral 5/25/2020    Procedure: Bilateral L3-5 MBB with local;  Surgeon: Roger Bhandari MD;  Location: [facility];  Service: Pain Management;  Laterality: Bilateral;    KNEE SURGERY      left    TUBAL " LIGATION         Social History     Tobacco Use    Smoking status: Never    Smokeless tobacco: Never   Substance Use Topics    Alcohol use: No    Drug use: No       Family History   Problem Relation Age of Onset    Hypertension Mother     Heart disease Mother     Heart attack Mother 64        MI    Diabetes Father     Osteoarthritis Sister     Heart disease Sister     Cancer Sister         1  sister had cervical ca    Osteoarthritis Brother     Heart disease Brother        Review of Systems   Constitutional: Negative for fever and malaise/fatigue.   HENT:  Negative for sore throat.    Eyes:  Negative for blurred vision.   Cardiovascular:  Positive for chest pain and dyspnea on exertion. Negative for claudication, cyanosis, irregular heartbeat, leg swelling, near-syncope, orthopnea, palpitations, paroxysmal nocturnal dyspnea and syncope.   Respiratory:  Negative for cough and hemoptysis.    Hematologic/Lymphatic: Negative for bleeding problem.   Skin:  Negative for rash.   Musculoskeletal:  Negative for falls.   Gastrointestinal:  Negative for abdominal pain.   Genitourinary: Negative.    Neurological: Negative.    Psychiatric/Behavioral:  Negative for altered mental status and substance abuse.      No current facility-administered medications on file prior to encounter.     Current Outpatient Medications on File Prior to Encounter   Medication Sig    acetaminophen (TYLENOL) 650 MG TbSR Take 650 mg by mouth every 8 (eight) hours.    albuterol (PROVENTIL HFA) 90 mcg/actuation inhaler Inhale 2 puffs into the lungs every 4 (four) hours as needed for Wheezing. 2 puffs two times daily    aspirin (ECOTRIN) 81 MG EC tablet Take 1 tablet (81 mg total) by mouth once daily.    atorvastatin (LIPITOR) 20 MG tablet Take 1 tablet (20 mg total) by mouth every evening.    betaxolol 0.5% (BETOPTIC-S) 0.5 % Drop INSTILL 1 DROP IN LEFT EYE TWICE DAILY    brimonidine 0.2% (ALPHAGAN) 0.2 % Drop Place 1 drop into both eyes 2 (two)  times a day.    carboxymethylcell/hypromellose (GENTEAL GEL OPHT) Apply to eye.    diclofenac sodium (VOLTAREN) 1 % Gel Apply topically.    dorzolamide (TRUSOPT) 2 % ophthalmic solution Place 1 drop into both eyes 2 (two) times daily.    fluticasone furoate-vilanterol (BREO) 100-25 mcg/dose diskus inhaler Inhale 1 puff into the lungs.    hydrochlorothiazide (HYDRODIURIL) 25 MG tablet TAKE ONE TABLET BY MOUTH EVERY DAY    hydrOXYzine (VISTARIL) 100 MG capsule Take 1 capsule by mouth 2 (two) times daily as needed.    isosorbide mononitrate (IMDUR) 60 MG 24 hr tablet Take 1 tablet (60 mg total) by mouth once daily.    latanoprost 0.005 % ophthalmic solution Place 1 drop into both eyes every evening.    metoprolol succinate (TOPROL-XL) 25 MG 24 hr tablet Take 1 tablet by mouth once daily.    oxybutynin (DITROPAN XL) 15 MG TR24 Take 5 mg by mouth once daily.    pantoprazole (PROTONIX) 40 MG tablet Take 1 tablet by mouth every morning.    tamsulosin (FLOMAX) 0.4 mg Cap Take 1 capsule by mouth once daily.    vitamin D (VITAMIN D3) 1000 units Tab Take by mouth.    nitroGLYCERIN (NITROSTAT) 0.4 MG SL tablet Place 1 tablet (0.4 mg total) under the tongue every 5 (five) minutes as needed for Chest pain.    traMADoL (ULTRAM) 50 mg tablet Take 50 mg by mouth 3 (three) times daily as needed.       Objective:   Objective:  Wt Readings from Last 3 Encounters:   09/23/22 117.9 kg (259 lb 14.8 oz)   07/25/22 117.9 kg (260 lb)   07/13/21 116.6 kg (257 lb 0.9 oz)     Temp Readings from Last 3 Encounters:   09/23/22 98.1 °F (36.7 °C) (Temporal)   07/18/20 98.2 °F (36.8 °C)   05/25/20 98.4 °F (36.9 °C) (Temporal)     BP Readings from Last 3 Encounters:   09/23/22 135/88   07/25/22 (!) 144/82   07/13/21 128/74     Pulse Readings from Last 3 Encounters:   09/23/22 65   07/25/22 73   07/18/20 (!) 53       Physical Exam  Vitals reviewed.   Constitutional:       Appearance: She is well-developed.   HENT:      Head: Normocephalic and  atraumatic.   Eyes:      General: No scleral icterus.     Conjunctiva/sclera: Conjunctivae normal.   Cardiovascular:      Rate and Rhythm: Normal rate and regular rhythm.      Pulses: Intact distal pulses.      Heart sounds: Normal heart sounds. No murmur heard.  Pulmonary:      Effort: No respiratory distress.      Breath sounds: No wheezing or rales.   Chest:      Chest wall: No tenderness.   Abdominal:      General: Bowel sounds are normal. There is no distension.      Palpations: Abdomen is soft.      Tenderness: There is no guarding.   Musculoskeletal:         General: Normal range of motion.      Cervical back: Normal range of motion and neck supple.   Skin:     General: Skin is warm.   Neurological:      Mental Status: She is alert and oriented to person, place, and time.       Lab Results   Component Value Date    CHOL 144 02/07/2017    CHOL 157 07/29/2016    CHOL 147 04/20/2015     Lab Results   Component Value Date    HDL 45 02/07/2017    HDL 48 07/29/2016    HDL 45 04/20/2015     Lab Results   Component Value Date    LDLCALC 89.4 02/07/2017    LDLCALC 98.8 07/29/2016    LDLCALC 92.0 04/20/2015     Lab Results   Component Value Date    TRIG 48 02/07/2017    TRIG 51 07/29/2016    TRIG 50 04/20/2015     Lab Results   Component Value Date    CHOLHDL 31.3 02/07/2017    CHOLHDL 30.6 07/29/2016    CHOLHDL 30.6 04/20/2015       Chemistry        Component Value Date/Time     09/08/2022 1003    K 4.1 09/08/2022 1003     09/08/2022 1003    CO2 32 (H) 09/08/2022 1003    BUN 23 09/08/2022 1003    CREATININE 1.4 09/08/2022 1003     (H) 09/08/2022 1003        Component Value Date/Time    CALCIUM 10.1 09/08/2022 1003    ALKPHOS 65 07/18/2020 1425    AST 23 07/18/2020 1425    ALT 22 07/18/2020 1425    BILITOT 0.7 07/18/2020 1425    ESTGFRAFRICA 39 (A) 07/18/2020 1425    EGFRNONAA 34 (A) 07/18/2020 1425          No results found for: TSH  Lab Results   Component Value Date    INR 1.0 09/08/2022    INR  1.1 12/07/2016    INR 1.0 01/28/2015     Lab Results   Component Value Date    WBC 5.70 09/08/2022    HGB 12.7 09/08/2022    HCT 39.7 09/08/2022    MCV 97 09/08/2022     09/08/2022     BNP  @LABRCNTIP(BNP,BNPTRIAGEBLO)@  CrCl cannot be calculated (Patient's most recent lab result is older than the maximum 7 days allowed.).     Imaging:  ======  No results found for this or any previous visit.    No results found for this or any previous visit.    No results found for this or any previous visit.    Results for orders placed during the hospital encounter of 12/07/16    X-Ray Chest PA And Lateral    Narrative  EXAM: Chest X-ray PA and Lateral    CLINICAL HISTORY:     Chest pain, unspecified    COMPARISON STUDIES:     01/28/2015    FINDINGS:  Cardiomegaly.  Tortuous descending thoracic aorta.  Lungs appear clear.  Thoracic scoliosis convex to the right.. Ribs appear intact.  IMPRESSION:  No acute findings.  Please see above      Electronically signed by: WILLI MARTINEZ MD  Date:     12/07/16  Time:    08:02    No results found for this or any previous visit.    No valid procedures specified.    Diagnostic Results:  ECG: Reviewed  Nsr., lvh    The ASCVD Risk score (Chela DK, et al., 2019) failed to calculate for the following reasons:    Cannot find a previous HDL lab    Cannot find a previous total cholesterol lab    Assessment and Plan:   S/P coronary artery stent placement  -     Vital signs; Standing  -     Verify informed consent, place on front of chart; Standing  -     Void on call to Cath Lab; Standing  -     Hold heparin drip; Standing  -     Clip and Prep Right Radial, Right Femoral; Standing  -     Diet NPO Except for: Medication; Standing  -     Place in Outpatient; Standing  -     Vital signs; Standing  -     Verify informed consent, place on front of chart; Standing  -     Void on call to Cath Lab; Standing  -     Hold heparin drip; Standing  -     Clip and Prep Right Femoral; Standing  -     Diet  NPO Except for: Medication; Standing  -     Basic metabolic panel; Standing  -     Hematology Profile; Standing  -     POCT urine pregnancy; Standing    Cardiomyopathy due to hypertension, without heart failure    Angina pectoris  -     Vital signs; Standing  -     Verify informed consent, place on front of chart; Standing  -     Void on call to Cath Lab; Standing  -     Hold heparin drip; Standing  -     Clip and Prep Right Radial, Right Femoral; Standing  -     Diet NPO Except for: Medication; Standing  -     Place in Outpatient; Standing    THAKKAR (dyspnea on exertion)    S/P angioplasty with stent    Other orders  -     Ambulate; Standing  -     0.9%  NaCl infusion  -     diphenhydrAMINE capsule 50 mg  -     aspirin chewable tablet 81 mg  -     Ambulate; Standing  -     0.9%  NaCl infusion  -     diphenhydrAMINE capsule 50 mg  -     aspirin chewable tablet 81 mg  R/LHC   CONSENTED AND AGREEABLE

## 2022-09-23 NOTE — Clinical Note
The PA catheter was repositioned to the right ventricle. Hemodynamics were performed. O2 saturation was measured at 64%. CO:5.5

## 2022-09-23 NOTE — BRIEF OP NOTE
[facility] - Cath Lab ([facility])  Brief Operative Note  Cardiology    SUMMARY     Surgery Date: 9/23/2022     Surgeon(s) and Role:     * Renee Colon MD - Primary    Assisting Surgeon: None    Pre-op Diagnosis:  Abnormal stress test [R94.39]  Angina pectoris [I20.9]  THAKKAR (dyspnea on exertion) [R06.00]  S/P angioplasty with stent [Z95.820]  Cardiomyopathy due to hypertension, without heart failure [I11.9, I43]    Post-op Diagnosis: Post-Op Diagnosis Codes:     * Abnormal stress test [R94.39]     * Angina pectoris [I20.9]     * THAKKAR (dyspnea on exertion) [R06.00]     * S/P angioplasty with stent [Z95.820]     * Cardiomyopathy due to hypertension, without heart failure [I11.9, I43]    Procedure Performed:     Procedure(s) (LRB):  CATHETERIZATION, HEART, BOTH LEFT AND RIGHT (N/A)    Technical Procedures Used:   Right femoral A/V access   Patent ostial LAD stent , no other disease   Right dominant   Normal right and left sided filling pressures         Estimated Blood Loss: * No values recorded between 9/23/2022  9:42 AM and 9/23/2022 10:05 AM *         Specimens:   Specimen (24h ago, onward)      None

## 2022-09-23 NOTE — Clinical Note
A percutaneous stick to the right femoral artery and vein was performed. Ultrasound guidance was used to obtain access.

## 2022-09-23 NOTE — TELEPHONE ENCOUNTER
"Patient states she had a heart cath this AM and currently c/o severe headache rated 8/10 on the top of her head and eyes. Patient also c/o new onset of ambulation requiring assistance.    Care Advice given to Go to ED Now. Patient states understanding of care advice and cites no additional concerns at this time.    Reason for Disposition   Unable to walk, or can only walk with assistance (e.g., requires support)    Additional Information   Negative: Difficult to awaken or acting confused  (e.g., disoriented, slurred speech)   Negative: [1] Weakness of the face, arm or leg on one side of the body AND [2] new onset   Negative: [1] Numbness of the face, arm or leg on one side of the body AND [2] new onset   Negative: [1] Loss of speech or garbled speech AND [2] new onset   Negative: Passed out (i.e., lost consciousness, collapsed and was not responding)   Negative: Sounds like a life-threatening emergency to the triager   Negative: Followed a head injury within last 3 days   Negative: Pregnant   Negative: Traumatic Brain Injury (TBI) is suspected    Answer Assessment - Initial Assessment Questions  1. LOCATION: "Where does it hurt?"       Top of head and around eyes.  2. ONSET: "When did the headache start?" (Minutes, hours or days)       30 minutes  3. PATTERN: "Does the pain come and go, or has it been constant since it started?"      Constant  4. SEVERITY: "How bad is the pain?" and "What does it keep you from doing?"  (e.g., Scale 1-10; mild, moderate, or severe)    - MILD (1-3): doesn't interfere with normal activities     - MODERATE (4-7): interferes with normal activities or awakens from sleep     - SEVERE (8-10): excruciating pain, unable to do any normal activities         8/10  5. RECURRENT SYMPTOM: "Have you ever had headaches before?" If so, ask: "When was the last time?" and "What happened that time?"       No  6. CAUSE: "What do you think is causing the headache?"      Possible medication  7. MIGRAINE: " ""Have you been diagnosed with migraine headaches?" If so, ask: "Is this headache similar?"       No  8. HEAD INJURY: "Has there been any recent injury to the head?"       No  9. OTHER SYMPTOMS: "Do you have any other symptoms?" (fever, stiff neck, eye pain, sore throat, cold symptoms)      No  10. PREGNANCY: "Is there any chance you are pregnant?" "When was your last menstrual period?"        N/A    Protocols used: Headache-A-    "

## 2022-09-23 NOTE — PLAN OF CARE
Went over discharge instructions with patient.   Stressed importance of making and keeping all follow ups as well as making prescribed medication changes.   Gave education material for safe mobility after discharge.   Walked patient to ensure patient was safe to go home.   Patient verbalized understanding and has no questions in regards to discharge.  IV removed, catheter intact.  Primary nurse notified of pt's discharge status.   Made sure patient has someone to drive them and explained not to drive for 24 hours.   Dressing to right groin C/D/I.

## 2022-09-23 NOTE — DISCHARGE INSTRUCTIONS
"                                      Post-op Heart Catheterization    1. DIET: It is advisable for you to follow a diet that limits the intake of salt, sugar, saturated fats and cholesterol.     2. FOR THE NEXT 24 HOURS:   For the next 8 hours, you should be watched by a responsible adult. This person should make sure your condition is not getting worse.   Don't drink any alcohol for the next 24 hours.  Don't drive, operate dangerous machinery, or make important business or personal decisions during the next 24 hours.  Your healthcare provider may tell you not to take any medicine by mouth for pain or sleep in the next 4 hours. These medicines may react with the medicines you were given in the hospital. This could cause a much stronger response than usual.       3. ACTIVITY:                                Day of discharge:             NO vigorous activity, lifting or straining                                                   Day after discharge:         Avoid heavy lifting (up to 10 lbs)                                                                        The day after discharge you may shower, but avoid tub baths for 5 days                                                                         Wash site gently with soap and water        NO powder or lotion to your procedure site.                 Before you shower, remove dressing, apply bandaid if desired                                                                                                                                                                            2nd day after discharge:  Resume normal activities                                                                         Exercise program as instructed      4. WOUND CARE: It is not unusual to have a small amount of bruising appear in the groin area. It is also common to have a tender "knot" develop beneath the skin at the puncture site in the groin. This is scar tissue only and is not a " "cause for concern or alarm. This tender knot may take several weeks to fully resolve. The bruise will usually spread over several days. If the lump gets bigger, call you doctor immediately.    5. DISCOMFORT: For general discomfort at the puncture site, you may take 1 or 2 Acetaminophen (Tylenol) tablets every 4 hours as needed. (Do not take more than 4000 mg a day)                 6. CALL YOUR HEALTHCARE PROVIDER IF YOU START TO HAVE THE FOLLOWING SYMPTOMS:        1. Problems with the affected leg: Pain, discomfort, loss of warmth, numbness or tingling                                                                                                                            2. Problems at the groin site: Bleeding, pain that is sudden/sharp/persistent,                   swelling at site or a change in "lump" size, increased redness or drainage at                     puncture site                                                               3. High fever (101 degrees or higher)       4.  Drowsiness that doesn't get better       5. Weakness or dizziness that doesn't get better            6. Repeated vomiting        7. GO TO  THE EMERGENCY ROOM OR CALL 911 IF YOU HAVE: Chest pains or discomforts not relieved with 3 nitroglycerin doses (sublingual tablets or spray), numbness or severe pain or if your foot or leg becomes cold or discolored or uncontrolled bleeding from site (apply direct pressure above site).   "

## 2022-09-23 NOTE — Clinical Note
The catheter was repositioned into the ostium   left main. Hemodynamics were performed.  An angiography was performed of the left coronary arteries. Multiple views were taken.

## 2022-09-23 NOTE — Clinical Note
The catheter was inserted into the ostium   right coronary artery. Hemodynamics were performed.  An angiography was performed of the right coronary arteries. Multiple views were taken.

## 2022-09-23 NOTE — DISCHARGE SUMMARY
[facility] (Hospital)  Cardiology  Discharge Summary      Patient Name: Kate Liar  MRN: [id number]  Admission Date: 9/23/2022  Hospital Length of Stay: 0 days  Discharge Date and Time: No discharge date for patient encounter.  Attending Physician: Renee Colon MD    Discharging Provider: Renee Colon MD  Primary Care Physician: SANDOVAL Chacon    HPI:   No notes on file    Procedure(s) (LRB):  CATHETERIZATION, HEART, BOTH LEFT AND RIGHT (N/A)  ANGIOGRAM, CORONARY ARTERY (N/A)     Indwelling Lines/Drains at time of discharge:  Lines/Drains/Airways       None                   Hospital Course:  No notes on file    Goals of Care Treatment Preferences:         Consults:     Significant Diagnostic Studies: Results for orders placed during the hospital encounter of 09/23/22    Cardiac catheterization    Conclusion    The pre-procedure left ventricular end diastolic pressure was 8.    The estimated blood loss was none.    There was non-obstructive coronary artery disease..    The filling pressures on the right and left were normal.    Patent ostial LAD stent with minimal ISR    The procedure log was documented by Documenter: Sirena Ugalde RN and verified by Renee Colon MD.    Date: 9/23/2022  Time: 10:40 AM      Pending Diagnostic Studies:       None            There are no hospital problems to display for this patient.    Assessment & plan notes cannot be loaded without a specified hospital service.      Discharged Condition: good    Disposition: Home or Self Care    Follow Up:    Patient Instructions:   No discharge procedures on file.  Medications:  Reconciled Home Medications:      Medication List        ASK your doctor about these medications      acetaminophen 650 MG Tbsr  Commonly known as: TYLENOL  Take 650 mg by mouth every 8 (eight) hours.     albuterol 90 mcg/actuation inhaler  Commonly known as: PROVENTIL HFA  Inhale 2 puffs into the lungs every 4 (four) hours as  needed for Wheezing. 2 puffs two times daily     aspirin 81 MG EC tablet  Commonly known as: ECOTRIN  Take 1 tablet (81 mg total) by mouth once daily.     atorvastatin 20 MG tablet  Commonly known as: LIPITOR  Take 1 tablet (20 mg total) by mouth every evening.     betaxolol 0.5% 0.5 % Drop  Commonly known as: BETOPTIC-S  INSTILL 1 DROP IN LEFT EYE TWICE DAILY     brimonidine 0.2% 0.2 % Drop  Commonly known as: ALPHAGAN  Place 1 drop into both eyes 2 (two) times a day.     diclofenac sodium 1 % Gel  Commonly known as: VOLTAREN  Apply topically.     dorzolamide 2 % ophthalmic solution  Commonly known as: TRUSOPT  Place 1 drop into both eyes 2 (two) times daily.     fluticasone furoate-vilanteroL 100-25 mcg/dose diskus inhaler  Commonly known as: BREO  Inhale 1 puff into the lungs.     GENTEAL GEL OPHT  Apply to eye.     hydroCHLOROthiazide 25 MG tablet  Commonly known as: HYDRODIURIL  TAKE ONE TABLET BY MOUTH EVERY DAY     hydrOXYzine 100 MG capsule  Commonly known as: VISTARIL  Take 1 capsule by mouth 2 (two) times daily as needed.     isosorbide mononitrate 60 MG 24 hr tablet  Commonly known as: IMDUR  Take 1 tablet (60 mg total) by mouth once daily.     latanoprost 0.005 % ophthalmic solution  Place 1 drop into both eyes every evening.     metoprolol succinate 25 MG 24 hr tablet  Commonly known as: TOPROL-XL  Take 1 tablet by mouth once daily.     nitroGLYCERIN 0.4 MG SL tablet  Commonly known as: NITROSTAT  Place 1 tablet (0.4 mg total) under the tongue every 5 (five) minutes as needed for Chest pain.     oxybutynin 15 MG Tr24  Commonly known as: DITROPAN XL  Take 5 mg by mouth once daily.     pantoprazole 40 MG tablet  Commonly known as: PROTONIX  Take 1 tablet by mouth every morning.     tamsulosin 0.4 mg Cap  Commonly known as: FLOMAX  Take 1 capsule by mouth once daily.     traMADoL 50 mg tablet  Commonly known as: ULTRAM  Take 50 mg by mouth 3 (three) times daily as needed.     vitamin D 1000 units  Tab  Commonly known as: VITAMIN D3  Take by mouth.              Time spent on the discharge of patient: 30 minutes    Renee Colon MD  Cardiology  [facility] ([facility])

## 2022-09-23 NOTE — Clinical Note
The PA catheter was inserted into the right pulmonary artery. Hemodynamics were performed. O2 saturation was measured.

## 2022-09-23 NOTE — Clinical Note
50 ml of contrast were injected throughout the case. 0 mL of contrast was the total wasted during the case. 50 mL was the total amount used during the case.

## 2022-10-03 ENCOUNTER — OFFICE VISIT (OUTPATIENT)
Dept: CARDIOLOGY | Facility: CLINIC | Age: 67
End: 2022-10-03
Payer: MEDICARE

## 2022-10-03 VITALS
HEIGHT: 68 IN | HEART RATE: 70 BPM | SYSTOLIC BLOOD PRESSURE: 128 MMHG | BODY MASS INDEX: 38.76 KG/M2 | OXYGEN SATURATION: 98 % | DIASTOLIC BLOOD PRESSURE: 62 MMHG | WEIGHT: 255.75 LBS

## 2022-10-03 DIAGNOSIS — Z95.5 S/P CORONARY ARTERY STENT PLACEMENT: ICD-10-CM

## 2022-10-03 DIAGNOSIS — I10 ESSENTIAL HYPERTENSION: Chronic | ICD-10-CM

## 2022-10-03 DIAGNOSIS — I25.10 CORONARY ARTERY DISEASE, OCCLUSIVE: Chronic | ICD-10-CM

## 2022-10-03 DIAGNOSIS — I67.2 CEREBRAL ATHEROSCLEROSIS: Primary | ICD-10-CM

## 2022-10-03 DIAGNOSIS — E78.5 HYPERLIPIDEMIA, UNSPECIFIED HYPERLIPIDEMIA TYPE: ICD-10-CM

## 2022-10-03 DIAGNOSIS — E66.9 OBESITY (BMI 30-39.9): ICD-10-CM

## 2022-10-03 DIAGNOSIS — J45.909 ASTHMA, UNSPECIFIED ASTHMA SEVERITY, UNSPECIFIED WHETHER COMPLICATED, UNSPECIFIED WHETHER PERSISTENT: ICD-10-CM

## 2022-10-03 DIAGNOSIS — E66.01 MORBID OBESITY WITH BMI OF 40.0-44.9, ADULT: ICD-10-CM

## 2022-10-03 PROCEDURE — 1101F PR PT FALLS ASSESS DOC 0-1 FALLS W/OUT INJ PAST YR: ICD-10-PCS | Mod: CPTII,S$GLB,, | Performed by: INTERNAL MEDICINE

## 2022-10-03 PROCEDURE — 1159F MED LIST DOCD IN RCRD: CPT | Mod: CPTII,S$GLB,, | Performed by: INTERNAL MEDICINE

## 2022-10-03 PROCEDURE — 3288F FALL RISK ASSESSMENT DOCD: CPT | Mod: CPTII,S$GLB,, | Performed by: INTERNAL MEDICINE

## 2022-10-03 PROCEDURE — 1126F PR PAIN SEVERITY QUANTIFIED, NO PAIN PRESENT: ICD-10-PCS | Mod: CPTII,S$GLB,, | Performed by: INTERNAL MEDICINE

## 2022-10-03 PROCEDURE — 99999 PR PBB SHADOW E&M-EST. PATIENT-LVL IV: CPT | Mod: PBBFAC,,, | Performed by: INTERNAL MEDICINE

## 2022-10-03 PROCEDURE — 3074F PR MOST RECENT SYSTOLIC BLOOD PRESSURE < 130 MM HG: ICD-10-PCS | Mod: CPTII,S$GLB,, | Performed by: INTERNAL MEDICINE

## 2022-10-03 PROCEDURE — 3008F PR BODY MASS INDEX (BMI) DOCUMENTED: ICD-10-PCS | Mod: CPTII,S$GLB,, | Performed by: INTERNAL MEDICINE

## 2022-10-03 PROCEDURE — 3288F PR FALLS RISK ASSESSMENT DOCUMENTED: ICD-10-PCS | Mod: CPTII,S$GLB,, | Performed by: INTERNAL MEDICINE

## 2022-10-03 PROCEDURE — 3074F SYST BP LT 130 MM HG: CPT | Mod: CPTII,S$GLB,, | Performed by: INTERNAL MEDICINE

## 2022-10-03 PROCEDURE — 99214 OFFICE O/P EST MOD 30 MIN: CPT | Mod: S$GLB,,, | Performed by: INTERNAL MEDICINE

## 2022-10-03 PROCEDURE — 99214 PR OFFICE/OUTPT VISIT, EST, LEVL IV, 30-39 MIN: ICD-10-PCS | Mod: S$GLB,,, | Performed by: INTERNAL MEDICINE

## 2022-10-03 PROCEDURE — 1126F AMNT PAIN NOTED NONE PRSNT: CPT | Mod: CPTII,S$GLB,, | Performed by: INTERNAL MEDICINE

## 2022-10-03 PROCEDURE — 3078F PR MOST RECENT DIASTOLIC BLOOD PRESSURE < 80 MM HG: ICD-10-PCS | Mod: CPTII,S$GLB,, | Performed by: INTERNAL MEDICINE

## 2022-10-03 PROCEDURE — 1159F PR MEDICATION LIST DOCUMENTED IN MEDICAL RECORD: ICD-10-PCS | Mod: CPTII,S$GLB,, | Performed by: INTERNAL MEDICINE

## 2022-10-03 PROCEDURE — 99999 PR PBB SHADOW E&M-EST. PATIENT-LVL IV: ICD-10-PCS | Mod: PBBFAC,,, | Performed by: INTERNAL MEDICINE

## 2022-10-03 PROCEDURE — 1101F PT FALLS ASSESS-DOCD LE1/YR: CPT | Mod: CPTII,S$GLB,, | Performed by: INTERNAL MEDICINE

## 2022-10-03 PROCEDURE — 3078F DIAST BP <80 MM HG: CPT | Mod: CPTII,S$GLB,, | Performed by: INTERNAL MEDICINE

## 2022-10-03 PROCEDURE — 3008F BODY MASS INDEX DOCD: CPT | Mod: CPTII,S$GLB,, | Performed by: INTERNAL MEDICINE

## 2022-10-03 RX ORDER — BUDESONIDE AND FORMOTEROL FUMARATE DIHYDRATE 80; 4.5 UG/1; UG/1
2 AEROSOL RESPIRATORY (INHALATION) 2 TIMES DAILY
COMMUNITY
Start: 2022-09-01

## 2022-10-03 NOTE — PROGRESS NOTES
"  Subjective:   Patient ID:  Kate Lira is a 66 y.o. female who presents for evaluation of No chief complaint on file.      HPI  10.30.2022  Comes in for follow up after cath   History of ostial LAD stent.  With normal nuclear stress however abnormal transient ischemic dilation.    Her coronary angiogram showed minimal to mild ISR of her LAD stent.  With mild disease in the LAD otherwise no other significant finding.    She presented the same day again to the hospital with headache.  A CT of the head showed right more than left-sided microvascular changes.    Otherwise no other findings.    She denies any chest pain.  No dyspnea on exertion.  Her groin access site is good.  No other complaints today.      7.25.2022  67 yo female, ostial LAD stent in 2015 with Dr Bateman, no other residual lesions   Had a normal nuclear stress in 2019   Review of her last office visit, mention of sticking underneath the left breast chest pain followed by normal nuc in 2019   Today still complains of moderate chest pain, described as pressure and sharp, mostly for the last week, sometimes " sticks me" , but can be worse with exertion , radiates toward her left shoulder, it was mildy reproducible with lifting arm against resistance  She also complains of THAKKAR NYHA 1 ,  no leg swelling or orthopnea  Denies snoring , or daytime sleepiness   BP mildly elevated today   She does also have asthma but denies any attacks or wheezing     Past Medical History:   Diagnosis Date    Arthritis     Asthma     Glaucoma     Hypertension        Past Surgical History:   Procedure Laterality Date    CATHETERIZATION OF BOTH LEFT AND RIGHT HEART N/A 9/23/2022    Procedure: CATHETERIZATION, HEART, BOTH LEFT AND RIGHT;  Surgeon: Renee Colon MD;  Location: [facility] CATH LAB;  Service: Cardiology;  Laterality: N/A;    CHOLECYSTECTOMY      CORONARY ANGIOGRAPHY N/A 9/23/2022    Procedure: ANGIOGRAM, CORONARY ARTERY;  Surgeon: Renee Colon MD;  " Location: [facility] CATH LAB;  Service: Cardiology;  Laterality: N/A;    CORONARY ANGIOPLASTY  2015    INJECTION OF ANESTHETIC AGENT AROUND MEDIAL BRANCH NERVES INNERVATING LUMBAR FACET JOINT Bilateral 2020    Procedure: Bilateral L3-5 MBB with local;  Surgeon: Roger Bhandari MD;  Location: [facility];  Service: Pain Management;  Laterality: Bilateral;    KNEE SURGERY      left    TUBAL LIGATION         Social History     Tobacco Use    Smoking status: Never    Smokeless tobacco: Never   Substance Use Topics    Alcohol use: No    Drug use: No       Family History   Problem Relation Age of Onset    Hypertension Mother     Heart disease Mother     Heart attack Mother 64        MI    Diabetes Father     Osteoarthritis Sister     Heart disease Sister     Cancer Sister         1  sister had cervical ca    Osteoarthritis Brother     Heart disease Brother        Review of Systems   Constitutional: Negative for fever and malaise/fatigue.   HENT:  Negative for sore throat.    Eyes:  Negative for blurred vision.   Cardiovascular:  Negative for chest pain, claudication, cyanosis, dyspnea on exertion, irregular heartbeat, leg swelling, near-syncope, orthopnea, palpitations, paroxysmal nocturnal dyspnea and syncope.   Respiratory:  Negative for cough and hemoptysis.    Hematologic/Lymphatic: Negative for bleeding problem.   Skin:  Negative for rash.   Musculoskeletal:  Negative for falls.   Gastrointestinal:  Negative for abdominal pain.   Genitourinary: Negative.    Neurological: Negative.    Psychiatric/Behavioral:  Negative for altered mental status and substance abuse.      Current Outpatient Medications on File Prior to Visit   Medication Sig    acetaminophen (TYLENOL) 650 MG TbSR Take 650 mg by mouth every 8 (eight) hours.    albuterol (PROVENTIL HFA) 90 mcg/actuation inhaler Inhale 2 puffs into the lungs every 4 (four) hours as needed for Wheezing. 2 puffs two times daily    aspirin (ECOTRIN) 81 MG EC  tablet Take 1 tablet (81 mg total) by mouth once daily.    atorvastatin (LIPITOR) 20 MG tablet Take 1 tablet (20 mg total) by mouth every evening.    betaxolol 0.5% (BETOPTIC-S) 0.5 % Drop INSTILL 1 DROP IN LEFT EYE TWICE DAILY    brimonidine 0.2% (ALPHAGAN) 0.2 % Drop Place 1 drop into both eyes 2 (two) times a day.    butalbital-acetaminophen-caffeine -40 mg (FIORICET, ESGIC) -40 mg per tablet Take 1 tablet by mouth every 4 (four) hours as needed for Pain or Headaches.    carboxymethylcell/hypromellose (GENTEAL GEL OPHT) Apply to eye.    diclofenac sodium (VOLTAREN) 1 % Gel Apply topically.    dorzolamide (TRUSOPT) 2 % ophthalmic solution Place 1 drop into both eyes 2 (two) times daily.    fluticasone furoate-vilanterol (BREO) 100-25 mcg/dose diskus inhaler Inhale 1 puff into the lungs.    hydrochlorothiazide (HYDRODIURIL) 25 MG tablet TAKE ONE TABLET BY MOUTH EVERY DAY    hydrOXYzine (VISTARIL) 100 MG capsule Take 1 capsule by mouth 2 (two) times daily as needed.    isosorbide mononitrate (IMDUR) 60 MG 24 hr tablet Take 1 tablet (60 mg total) by mouth once daily.    latanoprost 0.005 % ophthalmic solution Place 1 drop into both eyes every evening.    metoprolol succinate (TOPROL-XL) 25 MG 24 hr tablet Take 1 tablet by mouth once daily.    oxybutynin (DITROPAN XL) 15 MG TR24 Take 5 mg by mouth once daily.    pantoprazole (PROTONIX) 40 MG tablet Take 1 tablet by mouth every morning.    SYMBICORT 80-4.5 mcg/actuation HFAA Inhale 2 puffs into the lungs 2 (two) times daily.    tamsulosin (FLOMAX) 0.4 mg Cap Take 1 capsule by mouth once daily.    traMADoL (ULTRAM) 50 mg tablet Take 50 mg by mouth 3 (three) times daily as needed.    vitamin D (VITAMIN D3) 1000 units Tab Take by mouth.    nitroGLYCERIN (NITROSTAT) 0.4 MG SL tablet Place 1 tablet (0.4 mg total) under the tongue every 5 (five) minutes as needed for Chest pain. (Patient not taking: Reported on 10/3/2022)     No current facility-administered  medications on file prior to visit.       Objective:   Objective:  Wt Readings from Last 3 Encounters:   10/03/22 116 kg (255 lb 11.7 oz)   09/23/22 116 kg (255 lb 13.5 oz)   09/23/22 117.9 kg (259 lb 14.8 oz)     Temp Readings from Last 3 Encounters:   09/23/22 98.3 °F (36.8 °C) (Oral)   09/23/22 97.8 °F (36.6 °C) (Temporal)   07/18/20 98.2 °F (36.8 °C)     BP Readings from Last 3 Encounters:   10/03/22 128/62   09/23/22 139/64   09/23/22 135/62     Pulse Readings from Last 3 Encounters:   10/03/22 70   09/23/22 62   09/23/22 (!) 51       Physical Exam  Vitals reviewed.   Constitutional:       Appearance: She is well-developed.   HENT:      Head: Normocephalic and atraumatic.   Eyes:      General: No scleral icterus.     Conjunctiva/sclera: Conjunctivae normal.   Cardiovascular:      Rate and Rhythm: Normal rate and regular rhythm.      Pulses: Intact distal pulses.      Heart sounds: Normal heart sounds. No murmur heard.  Pulmonary:      Effort: No respiratory distress.      Breath sounds: No wheezing or rales.   Chest:      Chest wall: No tenderness.   Abdominal:      General: Bowel sounds are normal. There is no distension.      Palpations: Abdomen is soft.      Tenderness: There is no guarding.   Musculoskeletal:         General: Normal range of motion.      Cervical back: Normal range of motion and neck supple.   Skin:     General: Skin is warm.   Neurological:      Mental Status: She is alert and oriented to person, place, and time.       Lab Results   Component Value Date    CHOL 144 02/07/2017    CHOL 157 07/29/2016    CHOL 147 04/20/2015     Lab Results   Component Value Date    HDL 45 02/07/2017    HDL 48 07/29/2016    HDL 45 04/20/2015     Lab Results   Component Value Date    LDLCALC 89.4 02/07/2017    LDLCALC 98.8 07/29/2016    LDLCALC 92.0 04/20/2015     Lab Results   Component Value Date    TRIG 48 02/07/2017    TRIG 51 07/29/2016    TRIG 50 04/20/2015     Lab Results   Component Value Date     CHOLHDL 31.3 02/07/2017    CHOLHDL 30.6 07/29/2016    CHOLHDL 30.6 04/20/2015       Chemistry        Component Value Date/Time     09/23/2022 1856    K 3.6 09/23/2022 1856     09/23/2022 1856    CO2 28 09/23/2022 1856    BUN 20 09/23/2022 1856    CREATININE 1.4 09/23/2022 1856     09/23/2022 1856        Component Value Date/Time    CALCIUM 9.6 09/23/2022 1856    ALKPHOS 61 09/23/2022 1856    AST 22 09/23/2022 1856    ALT 25 09/23/2022 1856    BILITOT 0.7 09/23/2022 1856    ESTGFRAFRICA 39 (A) 07/18/2020 1425    EGFRNONAA 34 (A) 07/18/2020 1425          No results found for: TSH  Lab Results   Component Value Date    INR 1.0 09/08/2022    INR 1.1 12/07/2016    INR 1.0 01/28/2015     Lab Results   Component Value Date    WBC 5.09 09/23/2022    HGB 13.2 09/23/2022    HCT 40.6 09/23/2022    MCV 95 09/23/2022     09/23/2022     BNP  @LABRCNTIP(BNP,BNPTRIAGEBLO)@  CrCl cannot be calculated (Patient's most recent lab result is older than the maximum 7 days allowed.).     Imaging:  ======  No results found for this or any previous visit.    No results found for this or any previous visit.    No results found for this or any previous visit.    Results for orders placed during the hospital encounter of 12/07/16    X-Ray Chest PA And Lateral    Narrative  EXAM: Chest X-ray PA and Lateral    CLINICAL HISTORY:     Chest pain, unspecified    COMPARISON STUDIES:     01/28/2015    FINDINGS:  Cardiomegaly.  Tortuous descending thoracic aorta.  Lungs appear clear.  Thoracic scoliosis convex to the right.. Ribs appear intact.  IMPRESSION:  No acute findings.  Please see above      Electronically signed by: WILLI MARTINEZ MD  Date:     12/07/16  Time:    08:02    No results found for this or any previous visit.    No valid procedures specified.    Diagnostic Results:  ECG: Reviewed  Nsr., lvh      Results for orders placed during the hospital encounter of 09/23/22    Cardiac catheterization    Conclusion     The pre-procedure left ventricular end diastolic pressure was 8.    The estimated blood loss was none.    There was non-obstructive coronary artery disease..    The filling pressures on the right and left were normal.    Patent ostial LAD stent with minimal ISR    The procedure log was documented by Documenter: Sirena Ugalde RN and verified by Renee Colon MD.    Date: 9/23/2022  Time: 10:40 AM    The ASCVD Risk score (Chela DK, et al., 2019) failed to calculate for the following reasons:    Cannot find a previous HDL lab    Cannot find a previous total cholesterol lab    Assessment and Plan:   Cerebral atherosclerosis  -     CV Ultrasound Bilateral Doppler Carotid; Future    Essential hypertension    Hyperlipidemia, unspecified hyperlipidemia type    Coronary artery disease, occlusive    Asthma, unspecified asthma severity, unspecified whether complicated, unspecified whether persistent    Morbid obesity with BMI of 40.0-44.9, adult    Obesity (BMI 30-39.9)    S/P coronary artery stent placement  cw asa, lipitor and toprol , imdur  CW hctz   He due to the patient her films of her coronary angiogram.  Reviewed all tests and above medical conditions with patient in detail and formulated treatment plan.  Risk factor modification discussed.   Cardiac low salt diet discussed.  Maintaining healthy weight and weight loss goals were discussed in clinic.  Follow up in 6 months

## 2022-10-06 VITALS
TEMPERATURE: 98 F | WEIGHT: 259.94 LBS | BODY MASS INDEX: 39.4 KG/M2 | OXYGEN SATURATION: 100 % | RESPIRATION RATE: 17 BRPM | HEART RATE: 51 BPM | DIASTOLIC BLOOD PRESSURE: 62 MMHG | HEIGHT: 68 IN | SYSTOLIC BLOOD PRESSURE: 135 MMHG

## 2022-10-06 DIAGNOSIS — R07.9 CHEST PAIN, UNSPECIFIED TYPE: ICD-10-CM

## 2022-10-10 RX ORDER — NITROGLYCERIN 0.4 MG/1
0.4 TABLET SUBLINGUAL EVERY 5 MIN PRN
Qty: 30 TABLET | Refills: 6 | Status: SHIPPED | OUTPATIENT
Start: 2022-10-10 | End: 2023-11-27

## 2022-10-19 ENCOUNTER — OFFICE VISIT (OUTPATIENT)
Dept: OPHTHALMOLOGY | Facility: CLINIC | Age: 67
End: 2022-10-19
Payer: MEDICARE

## 2022-10-19 DIAGNOSIS — H40.1123 PRIMARY OPEN ANGLE GLAUCOMA (POAG) OF LEFT EYE, SEVERE STAGE: Primary | ICD-10-CM

## 2022-10-19 DIAGNOSIS — H25.13 NUCLEAR SCLEROSIS, BILATERAL: ICD-10-CM

## 2022-10-19 DIAGNOSIS — H04.129 DRY EYE: ICD-10-CM

## 2022-10-19 DIAGNOSIS — H40.1111 PRIMARY OPEN ANGLE GLAUCOMA (POAG) OF RIGHT EYE, MILD STAGE: ICD-10-CM

## 2022-10-19 PROCEDURE — 99999 PR PBB SHADOW E&M-EST. PATIENT-LVL III: ICD-10-PCS | Mod: PBBFAC,,, | Performed by: OPHTHALMOLOGY

## 2022-10-19 PROCEDURE — 92133 POSTERIOR SEGMENT OCT OPTIC NERVE(OCULAR COHERENCE TOMOGRAPHY) - OU - BOTH EYES: ICD-10-PCS | Mod: S$GLB,,, | Performed by: OPHTHALMOLOGY

## 2022-10-19 PROCEDURE — 1159F PR MEDICATION LIST DOCUMENTED IN MEDICAL RECORD: ICD-10-PCS | Mod: CPTII,S$GLB,, | Performed by: OPHTHALMOLOGY

## 2022-10-19 PROCEDURE — 99214 PR OFFICE/OUTPT VISIT, EST, LEVL IV, 30-39 MIN: ICD-10-PCS | Mod: S$GLB,,, | Performed by: OPHTHALMOLOGY

## 2022-10-19 PROCEDURE — 1159F MED LIST DOCD IN RCRD: CPT | Mod: CPTII,S$GLB,, | Performed by: OPHTHALMOLOGY

## 2022-10-19 PROCEDURE — 92083 HUMPHREY VISUAL FIELD - OU - BOTH EYES: ICD-10-PCS | Mod: S$GLB,,, | Performed by: OPHTHALMOLOGY

## 2022-10-19 PROCEDURE — 1160F RVW MEDS BY RX/DR IN RCRD: CPT | Mod: CPTII,S$GLB,, | Performed by: OPHTHALMOLOGY

## 2022-10-19 PROCEDURE — 1160F PR REVIEW ALL MEDS BY PRESCRIBER/CLIN PHARMACIST DOCUMENTED: ICD-10-PCS | Mod: CPTII,S$GLB,, | Performed by: OPHTHALMOLOGY

## 2022-10-19 PROCEDURE — 99999 PR PBB SHADOW E&M-EST. PATIENT-LVL III: CPT | Mod: PBBFAC,,, | Performed by: OPHTHALMOLOGY

## 2022-10-19 PROCEDURE — 99214 OFFICE O/P EST MOD 30 MIN: CPT | Mod: S$GLB,,, | Performed by: OPHTHALMOLOGY

## 2022-10-19 PROCEDURE — 92083 EXTENDED VISUAL FIELD XM: CPT | Mod: S$GLB,,, | Performed by: OPHTHALMOLOGY

## 2022-10-19 PROCEDURE — 92133 CPTRZD OPH DX IMG PST SGM ON: CPT | Mod: S$GLB,,, | Performed by: OPHTHALMOLOGY

## 2022-10-19 NOTE — PROGRESS NOTES
"HPI     Glaucoma            Comments: Patient reports for 2 month IOP check. Using gtts as advised.   States OD is constantly "jumping". Patient reports of visual stability   since previous visit.             Comments    Primary open angle glaucoma (POAG) of left eye, severe stage   Primary open angle glaucoma (POAG) of right eye, mild stage   Nuclear sclerosis, bilateral   Myopia with astigmatism and presbyopia, bilateral   SLT OS 7/26/22    GCL: 26/20 10/2021    Latanoprost more effective than Lumigan    Latanoprost OU QHS   Dorzolamide BID OU   Brimonidine OU TID   Betaxalol BID OS  Genteal Gel QHS OU            Last edited by Golden Galloway on 10/19/2022  9:53 AM.            Assessment /Plan     For exam results, see Encounter Report.      ICD-10-CM ICD-9-CM    1. Primary open angle glaucoma (POAG) of left eye, severe stage  H40.1123 365.11 Posterior Segment OCT Optic Nerve- Both eyes     365.73 Fitch Visual Field - OU - Extended - Both Eyes - stable but reduced reliability.    Would like IOP lower OU, but OS is more urgent.    IOP not within acceptable range relative to target IOP with risk of irreversible visual loss. Additional treatment required.  Discussed options, risks, and benefits of additional medication, SLT laser, or incisional glaucoma surgery.     Recommend Xen OS    Patient defers at this time, will try Rocklatan qd OU (d/c Latanoprost)    Reviewed importance of continued compliance with treatment and follow up.       2. Primary open angle glaucoma (POAG) of right eye, mild stage  H40.1111 365.11 As above.     365.71       3. Dry eye  H04.129 375.15 Findings and symptoms consistent with mild dry eyes.   Recommend regular use of Artificial Tears. These should be thought of as Ocular Surface Moisturizers similar to skin moisturizers in that regular use is required to achieve maximum benefit.  Specifically, I recommend the following:    Systane Ultra or Refresh Optive Ever 3 : 3-4 times a day. "   The first dose upon awakening is most important because we do not make tears at night.  Avoid generic products as they contain Benzalkonium Chloride as a preservative. This is a very irritating chemical and can make your eyes worse.    Omega 3 Fish Oils  1000 to 2000 mgs per day of Nordic Naturals or PRN Dry Eye formula [facility]      4. Nuclear sclerosis, bilateral  H25.13 366.16 You were found to have an early cataract in your eye(s) today, however the cataract is not affecting your activities of daily living, such as reading and driving.You do not need  surgery at this time. We will recheck your cataract at your next visit. You are welcome to call for an earlier appointment if your vision gets worse.           Return to clinic 1 month      Rocklatan qd OU  Dorzolamide BID OU   Brimonidine OU TID   Betaxalol BID OU  Genteal Gel QHS OU

## 2022-10-21 ENCOUNTER — HOSPITAL ENCOUNTER (OUTPATIENT)
Dept: CARDIOLOGY | Facility: HOSPITAL | Age: 67
Discharge: HOME | End: 2022-10-21
Attending: INTERNAL MEDICINE
Payer: MEDICARE

## 2022-10-21 VITALS
BODY MASS INDEX: 38.65 KG/M2 | HEIGHT: 68 IN | DIASTOLIC BLOOD PRESSURE: 62 MMHG | SYSTOLIC BLOOD PRESSURE: 128 MMHG | WEIGHT: 255 LBS

## 2022-10-21 DIAGNOSIS — I67.2 CEREBRAL ATHEROSCLEROSIS: ICD-10-CM

## 2022-10-21 LAB
LEFT ARM DIASTOLIC BLOOD PRESSURE: 62 MMHG
LEFT ARM SYSTOLIC BLOOD PRESSURE: 128 MMHG
LEFT CBA DIAS: 13 CM/S
LEFT CBA SYS: 91 CM/S
LEFT CCA DIST DIAS: 14 CM/S
LEFT CCA DIST SYS: 89 CM/S
LEFT CCA MID DIAS: 16 CM/S
LEFT CCA MID SYS: 103 CM/S
LEFT CCA PROX DIAS: 17 CM/S
LEFT CCA PROX SYS: 111 CM/S
LEFT ECA DIAS: 12 CM/S
LEFT ECA SYS: 98 CM/S
LEFT ICA DIST DIAS: 26 CM/S
LEFT ICA DIST SYS: 87 CM/S
LEFT ICA MID DIAS: 21 CM/S
LEFT ICA MID SYS: 89 CM/S
LEFT ICA PROX DIAS: 13 CM/S
LEFT ICA PROX SYS: 89 CM/S
LEFT VERTEBRAL DIAS: 11 CM/S
LEFT VERTEBRAL SYS: 48 CM/S
OHS CV CAROTID RIGHT ICA EDV HIGHEST: 30
OHS CV CAROTID ULTRASOUND LEFT ICA/CCA RATIO: 1
OHS CV CAROTID ULTRASOUND RIGHT ICA/CCA RATIO: 1.3
OHS CV PV CAROTID LEFT HIGHEST CCA: 111
OHS CV PV CAROTID LEFT HIGHEST ICA: 89
OHS CV PV CAROTID RIGHT HIGHEST CCA: 91
OHS CV PV CAROTID RIGHT HIGHEST ICA: 105
OHS CV US CAROTID LEFT HIGHEST EDV: 26
RIGHT ARM DIASTOLIC BLOOD PRESSURE: 60 MMHG
RIGHT ARM SYSTOLIC BLOOD PRESSURE: 125 MMHG
RIGHT CBA DIAS: 10 CM/S
RIGHT CBA SYS: 68 CM/S
RIGHT CCA DIST DIAS: 13 CM/S
RIGHT CCA DIST SYS: 81 CM/S
RIGHT CCA MID DIAS: 10 CM/S
RIGHT CCA MID SYS: 69 CM/S
RIGHT CCA PROX DIAS: 13 CM/S
RIGHT CCA PROX SYS: 91 CM/S
RIGHT ECA DIAS: 7 CM/S
RIGHT ECA SYS: 78 CM/S
RIGHT ICA DIST DIAS: 30 CM/S
RIGHT ICA DIST SYS: 105 CM/S
RIGHT ICA MID DIAS: 20 CM/S
RIGHT ICA MID SYS: 73 CM/S
RIGHT ICA PROX DIAS: 10 CM/S
RIGHT ICA PROX SYS: 79 CM/S
RIGHT VERTEBRAL DIAS: 10 CM/S
RIGHT VERTEBRAL SYS: 48 CM/S

## 2022-10-21 PROCEDURE — 93880 EXTRACRANIAL BILAT STUDY: CPT

## 2022-10-21 PROCEDURE — 93880 EXTRACRANIAL BILAT STUDY: CPT | Mod: 26,,, | Performed by: INTERNAL MEDICINE

## 2022-10-21 PROCEDURE — 93880 CV US DOPPLER CAROTID (CUPID ONLY): ICD-10-PCS | Mod: 26,,, | Performed by: INTERNAL MEDICINE

## 2022-10-25 ENCOUNTER — HOSPITAL ENCOUNTER (EMERGENCY)
Facility: HOSPITAL | Age: 67
Discharge: HOME | End: 2022-10-25
Attending: EMERGENCY MEDICINE
Payer: MEDICARE

## 2022-10-25 VITALS
RESPIRATION RATE: 16 BRPM | SYSTOLIC BLOOD PRESSURE: 138 MMHG | WEIGHT: 255.06 LBS | HEIGHT: 68 IN | TEMPERATURE: 100 F | OXYGEN SATURATION: 99 % | DIASTOLIC BLOOD PRESSURE: 65 MMHG | HEART RATE: 69 BPM | BODY MASS INDEX: 38.65 KG/M2

## 2022-10-25 DIAGNOSIS — R07.9 CHEST PAIN, UNSPECIFIED TYPE: Primary | ICD-10-CM

## 2022-10-25 DIAGNOSIS — R07.9 CHEST PAIN: ICD-10-CM

## 2022-10-25 DIAGNOSIS — K29.00 ACUTE SUPERFICIAL GASTRITIS WITHOUT HEMORRHAGE: ICD-10-CM

## 2022-10-25 LAB
ALBUMIN SERPL BCP-MCNC: 3.7 G/DL (ref 3.5–5.2)
ALP SERPL-CCNC: 55 U/L (ref 55–135)
ALT SERPL W/O P-5'-P-CCNC: 26 U/L (ref 10–44)
ANION GAP SERPL CALC-SCNC: 10 MMOL/L (ref 8–16)
AST SERPL-CCNC: 23 U/L (ref 10–40)
BASOPHILS # BLD AUTO: 0.02 K/UL (ref 0–0.2)
BASOPHILS NFR BLD: 0.4 % (ref 0–1.9)
BILIRUB SERPL-MCNC: 0.4 MG/DL (ref 0.1–1)
BNP SERPL-MCNC: 68 PG/ML (ref 0–99)
BUN SERPL-MCNC: 20 MG/DL (ref 8–23)
CALCIUM SERPL-MCNC: 9.5 MG/DL (ref 8.7–10.5)
CHLORIDE SERPL-SCNC: 101 MMOL/L (ref 95–110)
CO2 SERPL-SCNC: 27 MMOL/L (ref 23–29)
CREAT SERPL-MCNC: 1.4 MG/DL (ref 0.5–1.4)
DIFFERENTIAL METHOD: ABNORMAL
EOSINOPHIL # BLD AUTO: 0.1 K/UL (ref 0–0.5)
EOSINOPHIL NFR BLD: 1.7 % (ref 0–8)
ERYTHROCYTE [DISTWIDTH] IN BLOOD BY AUTOMATED COUNT: 13 % (ref 11.5–14.5)
EST. GFR  (NO RACE VARIABLE): 41 ML/MIN/1.73 M^2
GLUCOSE SERPL-MCNC: 129 MG/DL (ref 70–110)
HCT VFR BLD AUTO: 38.8 % (ref 37–48.5)
HGB BLD-MCNC: 12.3 G/DL (ref 12–16)
IMM GRANULOCYTES # BLD AUTO: 0.02 K/UL (ref 0–0.04)
IMM GRANULOCYTES NFR BLD AUTO: 0.4 % (ref 0–0.5)
LIPASE SERPL-CCNC: 7 U/L (ref 4–60)
LYMPHOCYTES # BLD AUTO: 0.6 K/UL (ref 1–4.8)
LYMPHOCYTES NFR BLD: 11.1 % (ref 18–48)
MCH RBC QN AUTO: 30.4 PG (ref 27–31)
MCHC RBC AUTO-ENTMCNC: 31.7 G/DL (ref 32–36)
MCV RBC AUTO: 96 FL (ref 82–98)
MONOCYTES # BLD AUTO: 0.7 K/UL (ref 0.3–1)
MONOCYTES NFR BLD: 13.1 % (ref 4–15)
NEUTROPHILS # BLD AUTO: 4 K/UL (ref 1.8–7.7)
NEUTROPHILS NFR BLD: 73.3 % (ref 38–73)
NRBC BLD-RTO: 0 /100 WBC
PLATELET # BLD AUTO: 169 K/UL (ref 150–450)
PMV BLD AUTO: 9.7 FL (ref 9.2–12.9)
POTASSIUM SERPL-SCNC: 3.5 MMOL/L (ref 3.5–5.1)
PROT SERPL-MCNC: 7 G/DL (ref 6–8.4)
RBC # BLD AUTO: 4.05 M/UL (ref 4–5.4)
SODIUM SERPL-SCNC: 138 MMOL/L (ref 136–145)
TROPONIN I SERPL DL<=0.01 NG/ML-MCNC: 0.01 NG/ML (ref 0–0.03)
TROPONIN I SERPL DL<=0.01 NG/ML-MCNC: 0.01 NG/ML (ref 0–0.03)
WBC # BLD AUTO: 5.42 K/UL (ref 3.9–12.7)

## 2022-10-25 PROCEDURE — 93010 ELECTROCARDIOGRAM REPORT: CPT | Mod: ,,, | Performed by: INTERNAL MEDICINE

## 2022-10-25 PROCEDURE — 25000003 PHARM REV CODE 250: Performed by: EMERGENCY MEDICINE

## 2022-10-25 PROCEDURE — 83690 ASSAY OF LIPASE: CPT | Performed by: EMERGENCY MEDICINE

## 2022-10-25 PROCEDURE — 63600175 PHARM REV CODE 636 W HCPCS: Performed by: EMERGENCY MEDICINE

## 2022-10-25 PROCEDURE — 84484 ASSAY OF TROPONIN QUANT: CPT | Performed by: EMERGENCY MEDICINE

## 2022-10-25 PROCEDURE — 96374 THER/PROPH/DIAG INJ IV PUSH: CPT

## 2022-10-25 PROCEDURE — 83880 ASSAY OF NATRIURETIC PEPTIDE: CPT | Performed by: EMERGENCY MEDICINE

## 2022-10-25 PROCEDURE — 93005 ELECTROCARDIOGRAM TRACING: CPT

## 2022-10-25 PROCEDURE — 93010 EKG 12-LEAD: ICD-10-PCS | Mod: ,,, | Performed by: INTERNAL MEDICINE

## 2022-10-25 PROCEDURE — 99285 EMERGENCY DEPT VISIT HI MDM: CPT | Mod: 25

## 2022-10-25 PROCEDURE — 80053 COMPREHEN METABOLIC PANEL: CPT | Performed by: EMERGENCY MEDICINE

## 2022-10-25 PROCEDURE — 85025 COMPLETE CBC W/AUTO DIFF WBC: CPT | Performed by: EMERGENCY MEDICINE

## 2022-10-25 RX ORDER — LIDOCAINE HYDROCHLORIDE 20 MG/ML
15 SOLUTION OROPHARYNGEAL ONCE
Status: COMPLETED | OUTPATIENT
Start: 2022-10-25 | End: 2022-10-25

## 2022-10-25 RX ORDER — ONDANSETRON 4 MG/1
4 TABLET, ORALLY DISINTEGRATING ORAL EVERY 6 HOURS PRN
Qty: 12 TABLET | Refills: 0 | Status: SHIPPED | OUTPATIENT
Start: 2022-10-25

## 2022-10-25 RX ORDER — ONDANSETRON 2 MG/ML
4 INJECTION INTRAMUSCULAR; INTRAVENOUS
Status: COMPLETED | OUTPATIENT
Start: 2022-10-25 | End: 2022-10-25

## 2022-10-25 RX ORDER — MAG HYDROX/ALUMINUM HYD/SIMETH 200-200-20
30 SUSPENSION, ORAL (FINAL DOSE FORM) ORAL ONCE
Status: COMPLETED | OUTPATIENT
Start: 2022-10-25 | End: 2022-10-25

## 2022-10-25 RX ADMIN — LIDOCAINE HYDROCHLORIDE 15 ML: 20 SOLUTION ORAL; TOPICAL at 08:10

## 2022-10-25 RX ADMIN — ALUMINUM HYDROXIDE, MAGNESIUM HYDROXIDE, AND SIMETHICONE 30 ML: 200; 200; 20 SUSPENSION ORAL at 08:10

## 2022-10-25 RX ADMIN — ONDANSETRON 4 MG: 2 INJECTION INTRAMUSCULAR; INTRAVENOUS at 08:10

## 2022-10-25 NOTE — ED PROVIDER NOTES
"SCRIBE #1 NOTE: I, Samir Verde, am scribing for, and in the presence of, Dion Abarca MD. I have scribed the entire note.       History     Chief Complaint   Patient presents with    Chest Pain     Per EMS, patient woke up with chest pain this morning, per EMS, patient reported taking Aspirin and Nitro x 2 prior to EMS arrival to home; patient reports pain decreased after Nitroglycerin for a current 0/10     Review of patient's allergies indicates:   Allergen Reactions    Ace inhibitors Anaphylaxis     (Throat swelling)         History of Present Illness     HPI    10/25/2022, 9:53 AM  History obtained from the patient      History of Present Illness: Kate Lira is a 66 y.o. female patient with a PMHx of arthritis, asthma, GERD, glaucoma, HTN who presents to the Emergency Department for evaluation of chest pain which onset gradually this morning PTA. Pt took aspirin and 2 nitro prior to EMS arriving. Pt reports the pain decreased after taking the medication. Pt's chest pain was a "burning" 9/10 and is now resolved. Symptoms are constant and moderate in severity. No mitigating or exacerbating factors reported. No associated sxs reported. Patient denies any n/v/d, fever, chills, and all other sxs at this time. Prior Tx includes Nitro and Aspirin. No further complaints or concerns at this time.       Arrival mode: EMS    PCP: SANDOVAL Chacon        Past Medical History:  Past Medical History:   Diagnosis Date    Arthritis     Asthma     GERD (gastroesophageal reflux disease)     Glaucoma     Hypertension        Past Surgical History:  Past Surgical History:   Procedure Laterality Date    CATHETERIZATION OF BOTH LEFT AND RIGHT HEART N/A 9/23/2022    Procedure: CATHETERIZATION, HEART, BOTH LEFT AND RIGHT;  Surgeon: Renee Colon MD;  Location: [facility] CATH LAB;  Service: Cardiology;  Laterality: N/A;    CHOLECYSTECTOMY      CORONARY ANGIOGRAPHY N/A 9/23/2022    Procedure: ANGIOGRAM, CORONARY " ARTERY;  Surgeon: Renee Colon MD;  Location: [facility] CATH LAB;  Service: Cardiology;  Laterality: N/A;    CORONARY ANGIOPLASTY  2015    INJECTION OF ANESTHETIC AGENT AROUND MEDIAL BRANCH NERVES INNERVATING LUMBAR FACET JOINT Bilateral 2020    Procedure: Bilateral L3-5 MBB with local;  Surgeon: Roger Bhandari MD;  Location: [facility];  Service: Pain Management;  Laterality: Bilateral;    KNEE SURGERY      left    TUBAL LIGATION           Family History:  Family History   Problem Relation Age of Onset    Hypertension Mother     Heart disease Mother     Heart attack Mother 64        MI    Diabetes Father     Osteoarthritis Sister     Heart disease Sister     Cancer Sister         1  sister had cervical ca    Osteoarthritis Brother     Heart disease Brother        Social History:  Social History     Tobacco Use    Smoking status: Never    Smokeless tobacco: Never   Substance and Sexual Activity    Alcohol use: No    Drug use: No    Sexual activity: Yes     Partners: Male        Review of Systems     Review of Systems   Constitutional:  Negative for chills and fever.   HENT:  Negative for sore throat.    Respiratory:  Negative for shortness of breath.    Cardiovascular:  Positive for chest pain.   Gastrointestinal:  Negative for diarrhea, nausea and vomiting.   Genitourinary:  Negative for dysuria.   Musculoskeletal:  Negative for back pain.   Skin:  Negative for rash.   Neurological:  Negative for weakness.   Hematological:  Does not bruise/bleed easily.   All other systems reviewed and are negative.     Physical Exam     Initial Vitals [10/25/22 0614]   BP Pulse Resp Temp SpO2   (!) 140/61 75 16 99.7 °F (37.6 °C) 97 %      MAP       --          Physical Exam  Nursing Notes and Vital Signs Reviewed.  Constitutional: Patient is in no acute distress. Well-developed and well-nourished.  Head: Atraumatic. Normocephalic.  Eyes: PERRL. EOM intact. Conjunctivae are not pale. No scleral icterus.  ENT:  "Mucous membranes are moist. Oropharynx is clear and symmetric.    Neck: Supple. Full ROM. No lymphadenopathy.  Cardiovascular: Regular rate. Regular rhythm. No murmurs, rubs, or gallops. Distal pulses are 2+ and symmetric.  Pulmonary/Chest: No respiratory distress. Clear to auscultation bilaterally. No wheezing or rales.  Abdominal: Soft and non-distended.  There is no tenderness.  No rebound, guarding, or rigidity. Good bowel sounds.  Genitourinary: No CVA tenderness  Musculoskeletal: Moves all extremities. No obvious deformities. No edema. No calf tenderness.  Skin: Warm and dry.  Neurological:  Alert, awake, and appropriate.  Normal speech.  No acute focal neurological deficits are appreciated.  Psychiatric: Normal affect. Good eye contact. Appropriate in content.     ED Course   Procedures  ED Vital Signs:  Vitals:    10/25/22 0614 10/25/22 0707 10/25/22 0710 10/25/22 0737   BP: (!) 140/61 (!) 117/58  (!) 118/57   Pulse: 75 70 70 71   Resp: 16 20  (!) 21   Temp: 99.7 °F (37.6 °C)      TempSrc: Oral      SpO2: 97% 97%  98%   Weight: 115.7 kg (255 lb 1.2 oz)      Height: 5' 8" (1.727 m)       10/25/22 0847 10/25/22 0902 10/25/22 1229   BP: 139/68 (!) 126/56 138/65   Pulse: 72 68 69   Resp:  19 16   Temp:   99.7 °F (37.6 °C)   TempSrc:   Oral   SpO2: 99% 99% 99%   Weight:      Height:          Abnormal Lab Results:  Labs Reviewed   CBC W/ AUTO DIFFERENTIAL - Abnormal; Notable for the following components:       Result Value    MCHC 31.7 (*)     Lymph # 0.6 (*)     Gran % 73.3 (*)     Lymph % 11.1 (*)     All other components within normal limits   COMPREHENSIVE METABOLIC PANEL - Abnormal; Notable for the following components:    Glucose 129 (*)     eGFR 41 (*)     All other components within normal limits   TROPONIN I   B-TYPE NATRIURETIC PEPTIDE   TROPONIN I   LIPASE   LIPASE        All Lab Results:  Results for orders placed or performed during the hospital encounter of 10/25/22   CBC auto differential   Result " Value Ref Range    WBC 5.42 3.90 - 12.70 K/uL    RBC 4.05 4.00 - 5.40 M/uL    Hemoglobin 12.3 12.0 - 16.0 g/dL    Hematocrit 38.8 37.0 - 48.5 %    MCV 96 82 - 98 fL    MCH 30.4 27.0 - 31.0 pg    MCHC 31.7 (L) 32.0 - 36.0 g/dL    RDW 13.0 11.5 - 14.5 %    Platelets 169 150 - 450 K/uL    MPV 9.7 9.2 - 12.9 fL    Immature Granulocytes 0.4 0.0 - 0.5 %    Gran # (ANC) 4.0 1.8 - 7.7 K/uL    Immature Grans (Abs) 0.02 0.00 - 0.04 K/uL    Lymph # 0.6 (L) 1.0 - 4.8 K/uL    Mono # 0.7 0.3 - 1.0 K/uL    Eos # 0.1 0.0 - 0.5 K/uL    Baso # 0.02 0.00 - 0.20 K/uL    nRBC 0 0 /100 WBC    Gran % 73.3 (H) 38.0 - 73.0 %    Lymph % 11.1 (L) 18.0 - 48.0 %    Mono % 13.1 4.0 - 15.0 %    Eosinophil % 1.7 0.0 - 8.0 %    Basophil % 0.4 0.0 - 1.9 %    Differential Method Automated    Comprehensive metabolic panel   Result Value Ref Range    Sodium 138 136 - 145 mmol/L    Potassium 3.5 3.5 - 5.1 mmol/L    Chloride 101 95 - 110 mmol/L    CO2 27 23 - 29 mmol/L    Glucose 129 (H) 70 - 110 mg/dL    BUN 20 8 - 23 mg/dL    Creatinine 1.4 0.5 - 1.4 mg/dL    Calcium 9.5 8.7 - 10.5 mg/dL    Total Protein 7.0 6.0 - 8.4 g/dL    Albumin 3.7 3.5 - 5.2 g/dL    Total Bilirubin 0.4 0.1 - 1.0 mg/dL    Alkaline Phosphatase 55 55 - 135 U/L    AST 23 10 - 40 U/L    ALT 26 10 - 44 U/L    Anion Gap 10 8 - 16 mmol/L    eGFR 41 (A) >60 mL/min/1.73 m^2   Troponin I #1   Result Value Ref Range    Troponin I 0.013 0.000 - 0.026 ng/mL   BNP   Result Value Ref Range    BNP 68 0 - 99 pg/mL   Troponin I #2   Result Value Ref Range    Troponin I 0.012 0.000 - 0.026 ng/mL   Lipase   Result Value Ref Range    Lipase 7 4 - 60 U/L         Imaging Results:  Imaging Results              X-Ray Chest AP Portable (Final result)  Result time 10/25/22 08:28:09      Final result by Parker Rider MD (10/25/22 08:28:09)                   Impression:      CHF.      Electronically signed by: Parker Rider  Date:    10/25/2022  Time:    08:28               Narrative:    EXAMINATION:  XR  CHEST AP PORTABLE    CLINICAL HISTORY:  Chest Pain;    FINDINGS:  Comparison is made to December 7, 2016.    The heart is similar to previous exam.  Pulmonary vasculature is engorged. No pneumothorax. No significant pleural effusion.                                       The EKG was ordered, reviewed, and independently interpreted by the ED provider.  Interpretation time: 0659  Rate: 71 BPM  Rhythm:  Sinus rhythm with 1st degree AV block  Interpretation: Nonspecific ST wave abnormality. No STEMI.             The Emergency Provider reviewed the vital signs and test results, which are outlined above.     ED Discussion   Heart Score is 4 but Recent LHC on 9/23/22 showed non-obstructive CAD and patent stent.  Pt states she feels she reacted too quickly this AM and should not have come to the ED.  I discussed her higher risk of ACS/MI and re-assured her to take any symptoms seriously.  She is eager for DC home and is not interested in additional observation or admission to the hospital.    12:08 PM: Reassessed pt at this time. Discussed with pt all pertinent ED information and results. Discussed pt dx and plan of tx. Gave pt all f/u and return to the ED instructions. All questions and concerns were addressed at this time. Pt expresses understanding of information and instructions, and is comfortable with plan to discharge. Pt is stable for discharge.    I discussed with patient and family/caretaker that evaluation in the ED does not suggest any emergent or life threatening medical conditions requiring immediate intervention beyond what was provided in the ED, and when considering recent LHC I believe patient is safe for discharge.  Regardless, an unremarkable evaluation in the ED does not preclude the development or presence of a serious of life threatening condition. As such, patient was instructed to return immediately for any worsening or change in current symptoms.         Medical Decision Making:   Clinical Tests:    Lab Tests: Ordered and Reviewed  Radiological Study: Ordered and Reviewed  Medical Tests: Ordered and Reviewed   Additional MDM:   Heart Score:    History:          Slightly suspicious.  ECG:             Normal  Age:               >65 years  Risk factors: >= 3 risk factors or history of atherosclerotic disease  Troponin:       Less than or equal to normal limit  Final Score: 4       ED Medication(s):  Medications   aluminum-magnesium hydroxide-simethicone 200-200-20 mg/5 mL suspension 30 mL (30 mLs Oral Given 10/25/22 0857)     And   LIDOcaine HCl 2% oral solution 15 mL (15 mLs Oral Given 10/25/22 0857)   ondansetron injection 4 mg (4 mg Intravenous Given 10/25/22 0858)       Discharge Medication List as of 10/25/2022 12:00 PM        START taking these medications    Details   ondansetron (ZOFRAN-ODT) 4 MG TbDL Take 1 tablet (4 mg total) by mouth every 6 (six) hours as needed., Starting Tue 10/25/2022, Normal              Follow-up Information       SANDOVAL Chacon. Schedule an appointment as soon as possible for a visit in 2 days.    Specialty: Family Medicine  Contact information:  [address] [facility]  [place] [address] [phone]               Renee Colon MD. Schedule an appointment as soon as possible for a visit in 1 week.    Specialties: Interventional Cardiology, Cardiology  Why: As needed  Contact information:  [address]  [place] [address] [phone]                                 Scribe Attestation:   Scribe #1: I performed the above scribed service and the documentation accurately describes the services I performed. I attest to the accuracy of the note.     Attending:   Physician Attestation Statement for Scribe #1: I, Dion Abarca MD, personally performed the services described in this documentation, as scribed by Samir Verde, in my presence, and it is both accurate and complete.           Clinical Impression       ICD-10-CM ICD-9-CM   1. Chest pain, unspecified  type  R07.9 786.50   2. Chest pain  R07.9 786.50   3. Acute superficial gastritis without hemorrhage  K29.00 535.40       Disposition:   Disposition: Discharged  Condition: Stable       Dion Abarca MD  10/26/22 0974

## 2022-10-25 NOTE — Clinical Note
Abilio Lira accompanied their mother to the emergency department on 10/25/2022. They may return to work on 10/25/2022.      If you have any questions or concerns, please don't hesitate to call.      Prisca SCHMID

## 2022-12-21 ENCOUNTER — OFFICE VISIT (OUTPATIENT)
Dept: OPHTHALMOLOGY | Facility: CLINIC | Age: 67
End: 2022-12-21
Payer: MEDICARE

## 2022-12-21 DIAGNOSIS — H25.13 NUCLEAR SCLEROSIS, BILATERAL: ICD-10-CM

## 2022-12-21 DIAGNOSIS — H40.1111 PRIMARY OPEN ANGLE GLAUCOMA (POAG) OF RIGHT EYE, MILD STAGE: ICD-10-CM

## 2022-12-21 DIAGNOSIS — H40.1123 PRIMARY OPEN ANGLE GLAUCOMA (POAG) OF LEFT EYE, SEVERE STAGE: Primary | ICD-10-CM

## 2022-12-21 DIAGNOSIS — Z91.199 NON-COMPLIANCE: ICD-10-CM

## 2022-12-21 DIAGNOSIS — H04.129 DRY EYE: ICD-10-CM

## 2022-12-21 PROCEDURE — 1160F PR REVIEW ALL MEDS BY PRESCRIBER/CLIN PHARMACIST DOCUMENTED: ICD-10-PCS | Mod: CPTII,S$GLB,, | Performed by: OPHTHALMOLOGY

## 2022-12-21 PROCEDURE — 99999 PR PBB SHADOW E&M-EST. PATIENT-LVL III: CPT | Mod: PBBFAC,,, | Performed by: OPHTHALMOLOGY

## 2022-12-21 PROCEDURE — 99214 OFFICE O/P EST MOD 30 MIN: CPT | Mod: S$GLB,,, | Performed by: OPHTHALMOLOGY

## 2022-12-21 PROCEDURE — 1159F MED LIST DOCD IN RCRD: CPT | Mod: CPTII,S$GLB,, | Performed by: OPHTHALMOLOGY

## 2022-12-21 PROCEDURE — 99214 PR OFFICE/OUTPT VISIT, EST, LEVL IV, 30-39 MIN: ICD-10-PCS | Mod: S$GLB,,, | Performed by: OPHTHALMOLOGY

## 2022-12-21 PROCEDURE — 1159F PR MEDICATION LIST DOCUMENTED IN MEDICAL RECORD: ICD-10-PCS | Mod: CPTII,S$GLB,, | Performed by: OPHTHALMOLOGY

## 2022-12-21 PROCEDURE — 99999 PR PBB SHADOW E&M-EST. PATIENT-LVL III: ICD-10-PCS | Mod: PBBFAC,,, | Performed by: OPHTHALMOLOGY

## 2022-12-21 PROCEDURE — 1160F RVW MEDS BY RX/DR IN RCRD: CPT | Mod: CPTII,S$GLB,, | Performed by: OPHTHALMOLOGY

## 2022-12-21 RX ORDER — ACETAZOLAMIDE 250 MG/1
250 TABLET ORAL 4 TIMES DAILY
Qty: 120 TABLET | Refills: 6 | Status: SHIPPED | OUTPATIENT
Start: 2022-12-21

## 2022-12-21 NOTE — PROGRESS NOTES
HPI     Glaucoma            Comments: Pt reports for 1m IOP check. Denies any pain or irritation. Va   stable. 100% compliant with gtts. Taking Brimonidine BID instead of TID.           Comments    Primary open angle glaucoma (POAG) of left eye, severe stage   Primary open angle glaucoma (POAG) of right eye, mild stage   Nuclear sclerosis, bilateral   Myopia with astigmatism and presbyopia, bilateral   SLT OS 7/26/22    GCL: 26/20 10/2021    Gcl: 25/19 10/19/2022    Latanoprost more effective than Lumigan       Rocklatan qd OU  Dorzolamide BID OU   Brimonidine OU TID   Betaxalol BID OU  Genteal Gel QHS OU            Last edited by Noble Krishnan on 12/21/2022  8:43 AM.            Assessment /Plan     For exam results, see Encounter Report.      ICD-10-CM ICD-9-CM    1. Primary open angle glaucoma (POAG) of left eye, severe stage  H40.1123 365.11 Iop Lower on Rocklatan yet not at goal- on max drops, will start Diamox pills today at low dose and taper up as tolerated   If IOP lower, may can d/c some drops then (may consider stopping Rocklatan and Dorzolamide since Rocklatan only gave 1 point lower)      365.73       2. Primary open angle glaucoma (POAG) of right eye, mild stage  H40.1111 365.11 See above      365.71       3. Dry eye  H04.129 375.15 Continue tears       4. Nuclear sclerosis, bilateral  H25.13 366.16       5. Non-compliance  Z91.199 V15.81 Encourage compliance           Start DMX take 1/2 pill BID x 12/26, then 1/2 pill TID x 1/1, then 1/2 pill QID until next visit       RETURN TO CLINIC 1 month     Rocklatan qd OU  Dorzolamide BID OU   Brimonidine OU TID   Betaxalol BID OU  Genteal Gel QHS OU

## 2023-01-27 ENCOUNTER — OFFICE VISIT (OUTPATIENT)
Dept: OPHTHALMOLOGY | Facility: CLINIC | Age: 68
End: 2023-01-27
Payer: MEDICARE

## 2023-01-27 DIAGNOSIS — Z91.199 NON-COMPLIANCE: ICD-10-CM

## 2023-01-27 DIAGNOSIS — H04.129 DRY EYE: ICD-10-CM

## 2023-01-27 DIAGNOSIS — H40.1123 PRIMARY OPEN ANGLE GLAUCOMA (POAG) OF LEFT EYE, SEVERE STAGE: Primary | ICD-10-CM

## 2023-01-27 DIAGNOSIS — H25.13 NUCLEAR SCLEROSIS, BILATERAL: ICD-10-CM

## 2023-01-27 DIAGNOSIS — H40.1111 PRIMARY OPEN ANGLE GLAUCOMA (POAG) OF RIGHT EYE, MILD STAGE: ICD-10-CM

## 2023-01-27 PROCEDURE — 1159F MED LIST DOCD IN RCRD: CPT | Mod: CPTII,S$GLB,, | Performed by: OPHTHALMOLOGY

## 2023-01-27 PROCEDURE — 1160F RVW MEDS BY RX/DR IN RCRD: CPT | Mod: CPTII,S$GLB,, | Performed by: OPHTHALMOLOGY

## 2023-01-27 PROCEDURE — 1159F PR MEDICATION LIST DOCUMENTED IN MEDICAL RECORD: ICD-10-PCS | Mod: CPTII,S$GLB,, | Performed by: OPHTHALMOLOGY

## 2023-01-27 PROCEDURE — 99214 OFFICE O/P EST MOD 30 MIN: CPT | Mod: S$GLB,,, | Performed by: OPHTHALMOLOGY

## 2023-01-27 PROCEDURE — 1160F PR REVIEW ALL MEDS BY PRESCRIBER/CLIN PHARMACIST DOCUMENTED: ICD-10-PCS | Mod: CPTII,S$GLB,, | Performed by: OPHTHALMOLOGY

## 2023-01-27 PROCEDURE — 99999 PR PBB SHADOW E&M-EST. PATIENT-LVL III: CPT | Mod: PBBFAC,,, | Performed by: OPHTHALMOLOGY

## 2023-01-27 PROCEDURE — 99214 PR OFFICE/OUTPT VISIT, EST, LEVL IV, 30-39 MIN: ICD-10-PCS | Mod: S$GLB,,, | Performed by: OPHTHALMOLOGY

## 2023-01-27 PROCEDURE — 99999 PR PBB SHADOW E&M-EST. PATIENT-LVL III: ICD-10-PCS | Mod: PBBFAC,,, | Performed by: OPHTHALMOLOGY

## 2023-01-27 RX ORDER — BETAXOLOL HYDROCHLORIDE 5 MG/ML
1 SOLUTION/ DROPS OPHTHALMIC 2 TIMES DAILY
Qty: 10 ML | Refills: 4 | Status: SHIPPED | OUTPATIENT
Start: 2023-01-27 | End: 2023-09-20 | Stop reason: SDUPTHER

## 2023-01-27 NOTE — PROGRESS NOTES
HPI     Glaucoma            Comments: Patient reports for 1 month IOP check. Using gtts as advised.   Using DMX TID, states cardio advised it may be causing BP to lower. Denies   pain or irritation at this time. Patient reports of visual stability since   previous visit.             Comments    Primary open angle glaucoma (POAG) of left eye, severe stage   Primary open angle glaucoma (POAG) of right eye, mild stage   Nuclear sclerosis, bilateral   Myopia with astigmatism and presbyopia, bilateral   SLT OS 7/26/22    GCL: 26/20 10/2021  Gcl: 25/19  10/19/2022      Latanoprost more effective than Lumigan  **DMX causing low BP per cardiologist**      *FLOMAX*       Rocklatan qd OU  Dorzolamide BID OU   Brimonidine OU TID   Betaxalol BID OU  Genteal Gel QHS OU  DMX 1/2 pill TID            Last edited by Noble Guillory MD on 1/27/2023 10:08 AM.            Assessment /Plan     For exam results, see Encounter Report.      ICD-10-CM ICD-9-CM    1. Primary open angle glaucoma (POAG) of left eye, severe stage  H40.1123 365.11 IOP better today OU and pt is tolerating dose of DMX - went to lower dose as Cardio MD noticed BP was low      365.73       2. Primary open angle glaucoma (POAG) of right eye, mild stage  H40.1111 365.11 Se above      365.71       3. Dry eye  H04.129 375.15 Use tears       4. Nuclear sclerosis, bilateral  H25.13 366.16 Follow at this time       5. Non-compliance  Z91.199 V15.81 Pt back on meds at this time           Rocklatan qd OU  Dorzolamide BID OU   Brimonidine OU TID   Betaxalol BID OU  Genteal Gel QHS OU  DMX 1/2  250 pill TID PO     RETURN TO CLINIC 2 month IOP

## 2023-04-03 ENCOUNTER — OFFICE VISIT (OUTPATIENT)
Dept: CARDIOLOGY | Facility: CLINIC | Age: 68
End: 2023-04-03
Payer: MEDICARE

## 2023-04-03 ENCOUNTER — OFFICE VISIT (OUTPATIENT)
Dept: OBSTETRICS AND GYNECOLOGY | Facility: CLINIC | Age: 68
End: 2023-04-03
Payer: MEDICARE

## 2023-04-03 VITALS
SYSTOLIC BLOOD PRESSURE: 113 MMHG | HEART RATE: 64 BPM | OXYGEN SATURATION: 98 % | WEIGHT: 230.19 LBS | DIASTOLIC BLOOD PRESSURE: 76 MMHG | BODY MASS INDEX: 35 KG/M2

## 2023-04-03 VITALS
DIASTOLIC BLOOD PRESSURE: 60 MMHG | HEIGHT: 68 IN | WEIGHT: 229.5 LBS | SYSTOLIC BLOOD PRESSURE: 100 MMHG | BODY MASS INDEX: 34.78 KG/M2

## 2023-04-03 DIAGNOSIS — J45.909 ASTHMA, UNSPECIFIED ASTHMA SEVERITY, UNSPECIFIED WHETHER COMPLICATED, UNSPECIFIED WHETHER PERSISTENT: ICD-10-CM

## 2023-04-03 DIAGNOSIS — E66.9 OBESITY (BMI 30-39.9): ICD-10-CM

## 2023-04-03 DIAGNOSIS — Z12.4 ENCOUNTER FOR SCREENING FOR CERVICAL CANCER: ICD-10-CM

## 2023-04-03 DIAGNOSIS — Z01.419 WELL WOMAN EXAM WITH ROUTINE GYNECOLOGICAL EXAM: Primary | ICD-10-CM

## 2023-04-03 DIAGNOSIS — E78.5 HYPERLIPIDEMIA, UNSPECIFIED HYPERLIPIDEMIA TYPE: ICD-10-CM

## 2023-04-03 DIAGNOSIS — I10 ESSENTIAL HYPERTENSION: Primary | Chronic | ICD-10-CM

## 2023-04-03 DIAGNOSIS — R10.819 SUPRAPUBIC TENDERNESS: ICD-10-CM

## 2023-04-03 DIAGNOSIS — I25.10 CORONARY ARTERY DISEASE, OCCLUSIVE: Chronic | ICD-10-CM

## 2023-04-03 DIAGNOSIS — I95.9 HYPOTENSION, UNSPECIFIED HYPOTENSION TYPE: ICD-10-CM

## 2023-04-03 DIAGNOSIS — E66.01 MORBID OBESITY WITH BMI OF 40.0-44.9, ADULT: ICD-10-CM

## 2023-04-03 PROCEDURE — 1126F PR PAIN SEVERITY QUANTIFIED, NO PAIN PRESENT: ICD-10-PCS | Mod: CPTII,S$GLB,, | Performed by: INTERNAL MEDICINE

## 2023-04-03 PROCEDURE — 3078F DIAST BP <80 MM HG: CPT | Mod: CPTII,S$GLB,, | Performed by: INTERNAL MEDICINE

## 2023-04-03 PROCEDURE — 3078F PR MOST RECENT DIASTOLIC BLOOD PRESSURE < 80 MM HG: ICD-10-PCS | Mod: CPTII,S$GLB,, | Performed by: INTERNAL MEDICINE

## 2023-04-03 PROCEDURE — 1101F PR PT FALLS ASSESS DOC 0-1 FALLS W/OUT INJ PAST YR: ICD-10-PCS | Mod: CPTII,S$GLB,, | Performed by: INTERNAL MEDICINE

## 2023-04-03 PROCEDURE — 3288F PR FALLS RISK ASSESSMENT DOCUMENTED: ICD-10-PCS | Mod: CPTII,S$GLB,, | Performed by: INTERNAL MEDICINE

## 2023-04-03 PROCEDURE — 87624 HPV HI-RISK TYP POOLED RSLT: CPT | Performed by: NURSE PRACTITIONER

## 2023-04-03 PROCEDURE — G0101 PR CA SCREEN;PELVIC/BREAST EXAM: ICD-10-PCS | Mod: GZ,,, | Performed by: NURSE PRACTITIONER

## 2023-04-03 PROCEDURE — 3008F PR BODY MASS INDEX (BMI) DOCUMENTED: ICD-10-PCS | Mod: CPTII,S$GLB,, | Performed by: NURSE PRACTITIONER

## 2023-04-03 PROCEDURE — 3008F BODY MASS INDEX DOCD: CPT | Mod: CPTII,S$GLB,, | Performed by: INTERNAL MEDICINE

## 2023-04-03 PROCEDURE — 1159F PR MEDICATION LIST DOCUMENTED IN MEDICAL RECORD: ICD-10-PCS | Mod: CPTII,S$GLB,, | Performed by: NURSE PRACTITIONER

## 2023-04-03 PROCEDURE — 1159F PR MEDICATION LIST DOCUMENTED IN MEDICAL RECORD: ICD-10-PCS | Mod: CPTII,S$GLB,, | Performed by: INTERNAL MEDICINE

## 2023-04-03 PROCEDURE — 3288F PR FALLS RISK ASSESSMENT DOCUMENTED: ICD-10-PCS | Mod: CPTII,S$GLB,, | Performed by: NURSE PRACTITIONER

## 2023-04-03 PROCEDURE — 99214 PR OFFICE/OUTPT VISIT, EST, LEVL IV, 30-39 MIN: ICD-10-PCS | Mod: S$GLB,,, | Performed by: INTERNAL MEDICINE

## 2023-04-03 PROCEDURE — 88175 CYTOPATH C/V AUTO FLUID REDO: CPT | Performed by: NURSE PRACTITIONER

## 2023-04-03 PROCEDURE — G0101 CA SCREEN;PELVIC/BREAST EXAM: HCPCS | Mod: GZ,,, | Performed by: NURSE PRACTITIONER

## 2023-04-03 PROCEDURE — 1160F PR REVIEW ALL MEDS BY PRESCRIBER/CLIN PHARMACIST DOCUMENTED: ICD-10-PCS | Mod: CPTII,S$GLB,, | Performed by: NURSE PRACTITIONER

## 2023-04-03 PROCEDURE — 3288F FALL RISK ASSESSMENT DOCD: CPT | Mod: CPTII,S$GLB,, | Performed by: NURSE PRACTITIONER

## 2023-04-03 PROCEDURE — 3008F PR BODY MASS INDEX (BMI) DOCUMENTED: ICD-10-PCS | Mod: CPTII,S$GLB,, | Performed by: INTERNAL MEDICINE

## 2023-04-03 PROCEDURE — 99999 PR PBB SHADOW E&M-EST. PATIENT-LVL IV: ICD-10-PCS | Mod: PBBFAC,,, | Performed by: NURSE PRACTITIONER

## 2023-04-03 PROCEDURE — 3288F FALL RISK ASSESSMENT DOCD: CPT | Mod: CPTII,S$GLB,, | Performed by: INTERNAL MEDICINE

## 2023-04-03 PROCEDURE — 99999 PR PBB SHADOW E&M-EST. PATIENT-LVL IV: ICD-10-PCS | Mod: PBBFAC,,, | Performed by: INTERNAL MEDICINE

## 2023-04-03 PROCEDURE — 1101F PT FALLS ASSESS-DOCD LE1/YR: CPT | Mod: CPTII,S$GLB,, | Performed by: NURSE PRACTITIONER

## 2023-04-03 PROCEDURE — 1159F MED LIST DOCD IN RCRD: CPT | Mod: CPTII,S$GLB,, | Performed by: NURSE PRACTITIONER

## 2023-04-03 PROCEDURE — 1125F PR PAIN SEVERITY QUANTIFIED, PAIN PRESENT: ICD-10-PCS | Mod: CPTII,S$GLB,, | Performed by: NURSE PRACTITIONER

## 2023-04-03 PROCEDURE — 3078F DIAST BP <80 MM HG: CPT | Mod: CPTII,S$GLB,, | Performed by: NURSE PRACTITIONER

## 2023-04-03 PROCEDURE — 1101F PT FALLS ASSESS-DOCD LE1/YR: CPT | Mod: CPTII,S$GLB,, | Performed by: INTERNAL MEDICINE

## 2023-04-03 PROCEDURE — 1126F AMNT PAIN NOTED NONE PRSNT: CPT | Mod: CPTII,S$GLB,, | Performed by: INTERNAL MEDICINE

## 2023-04-03 PROCEDURE — 3008F BODY MASS INDEX DOCD: CPT | Mod: CPTII,S$GLB,, | Performed by: NURSE PRACTITIONER

## 2023-04-03 PROCEDURE — 1125F AMNT PAIN NOTED PAIN PRSNT: CPT | Mod: CPTII,S$GLB,, | Performed by: NURSE PRACTITIONER

## 2023-04-03 PROCEDURE — 1159F MED LIST DOCD IN RCRD: CPT | Mod: CPTII,S$GLB,, | Performed by: INTERNAL MEDICINE

## 2023-04-03 PROCEDURE — 3074F SYST BP LT 130 MM HG: CPT | Mod: CPTII,S$GLB,, | Performed by: NURSE PRACTITIONER

## 2023-04-03 PROCEDURE — 99999 PR PBB SHADOW E&M-EST. PATIENT-LVL IV: CPT | Mod: PBBFAC,,, | Performed by: NURSE PRACTITIONER

## 2023-04-03 PROCEDURE — 3074F PR MOST RECENT SYSTOLIC BLOOD PRESSURE < 130 MM HG: ICD-10-PCS | Mod: CPTII,S$GLB,, | Performed by: INTERNAL MEDICINE

## 2023-04-03 PROCEDURE — 1160F RVW MEDS BY RX/DR IN RCRD: CPT | Mod: CPTII,S$GLB,, | Performed by: NURSE PRACTITIONER

## 2023-04-03 PROCEDURE — 1101F PR PT FALLS ASSESS DOC 0-1 FALLS W/OUT INJ PAST YR: ICD-10-PCS | Mod: CPTII,S$GLB,, | Performed by: NURSE PRACTITIONER

## 2023-04-03 PROCEDURE — 3074F PR MOST RECENT SYSTOLIC BLOOD PRESSURE < 130 MM HG: ICD-10-PCS | Mod: CPTII,S$GLB,, | Performed by: NURSE PRACTITIONER

## 2023-04-03 PROCEDURE — 99214 OFFICE O/P EST MOD 30 MIN: CPT | Mod: S$GLB,,, | Performed by: INTERNAL MEDICINE

## 2023-04-03 PROCEDURE — 3074F SYST BP LT 130 MM HG: CPT | Mod: CPTII,S$GLB,, | Performed by: INTERNAL MEDICINE

## 2023-04-03 PROCEDURE — 3078F PR MOST RECENT DIASTOLIC BLOOD PRESSURE < 80 MM HG: ICD-10-PCS | Mod: CPTII,S$GLB,, | Performed by: NURSE PRACTITIONER

## 2023-04-03 PROCEDURE — 99999 PR PBB SHADOW E&M-EST. PATIENT-LVL IV: CPT | Mod: PBBFAC,,, | Performed by: INTERNAL MEDICINE

## 2023-04-03 NOTE — PROGRESS NOTES
"  Subjective:   Patient ID:  Kate Lira is a 67 y.o. female who presents for evaluation of Cerebral atherosclerosis        HPI  4.3.2023  Comes in for a 6months follow up  Started diamox as she states for her eye condition   Since then seeing low BP   Her PCP lower toprol to 12.5 mg daily   Still notices low bp at times and feels weak with it sometimes   No syncope   No other cardiac complaints     10.30.2022  Comes in for follow up after cath   History of ostial LAD stent.  With normal nuclear stress however abnormal transient ischemic dilation.    Her coronary angiogram showed minimal to mild ISR of her LAD stent.  With mild disease in the LAD otherwise no other significant finding.    She presented the same day again to the hospital with headache.  A CT of the head showed right more than left-sided microvascular changes.    Otherwise no other findings.    She denies any chest pain.  No dyspnea on exertion.  Her groin access site is good.  No other complaints today.      7.25.2022  65 yo female, ostial LAD stent in 2015 with Dr Bateman, no other residual lesions   Had a normal nuclear stress in 2019   Review of her last office visit, mention of sticking underneath the left breast chest pain followed by normal nuc in 2019   Today still complains of moderate chest pain, described as pressure and sharp, mostly for the last week, sometimes " sticks me" , but can be worse with exertion , radiates toward her left shoulder, it was mildy reproducible with lifting arm against resistance  She also complains of THAKKAR NYHA 1 ,  no leg swelling or orthopnea  Denies snoring , or daytime sleepiness   BP mildly elevated today   She does also have asthma but denies any attacks or wheezing     Past Medical History:   Diagnosis Date    Arthritis     Asthma     GERD (gastroesophageal reflux disease)     Glaucoma     Hypertension        Past Surgical History:   Procedure Laterality Date    CATHETERIZATION OF BOTH LEFT AND RIGHT " HEART N/A 2022    Procedure: CATHETERIZATION, HEART, BOTH LEFT AND RIGHT;  Surgeon: Renee Cloon MD;  Location: [facility] CATH LAB;  Service: Cardiology;  Laterality: N/A;    CHOLECYSTECTOMY      CORONARY ANGIOGRAPHY N/A 2022    Procedure: ANGIOGRAM, CORONARY ARTERY;  Surgeon: Renee Colon MD;  Location: [facility] CATH LAB;  Service: Cardiology;  Laterality: N/A;    CORONARY ANGIOPLASTY  2015    INJECTION OF ANESTHETIC AGENT AROUND MEDIAL BRANCH NERVES INNERVATING LUMBAR FACET JOINT Bilateral 2020    Procedure: Bilateral L3-5 MBB with local;  Surgeon: Roger Bhandari MD;  Location: [facility];  Service: Pain Management;  Laterality: Bilateral;    KNEE SURGERY      left    TUBAL LIGATION         Social History     Tobacco Use    Smoking status: Never    Smokeless tobacco: Never   Substance Use Topics    Alcohol use: No    Drug use: No       Family History   Problem Relation Age of Onset    Hypertension Mother     Heart disease Mother     Heart attack Mother 64        MI    Diabetes Father     Osteoarthritis Sister     Heart disease Sister     Cancer Sister         1  sister had cervical ca    Osteoarthritis Brother     Heart disease Brother        Review of Systems   Constitutional: Negative for fever and malaise/fatigue.   HENT:  Negative for sore throat.    Eyes:  Negative for blurred vision.   Cardiovascular:  Negative for chest pain, claudication, cyanosis, dyspnea on exertion, irregular heartbeat, leg swelling, near-syncope, orthopnea, palpitations, paroxysmal nocturnal dyspnea and syncope.   Respiratory:  Negative for cough and hemoptysis.    Hematologic/Lymphatic: Negative for bleeding problem.   Skin:  Negative for rash.   Musculoskeletal:  Negative for falls.   Gastrointestinal:  Negative for abdominal pain.   Genitourinary: Negative.    Neurological: Negative.    Psychiatric/Behavioral:  Negative for altered mental status and substance abuse.      Current Outpatient  Medications on File Prior to Visit   Medication Sig    acetaminophen (TYLENOL) 650 MG TbSR Take 650 mg by mouth every 8 (eight) hours.    acetaZOLAMIDE (DIAMOX) 250 MG tablet Take 1 tablet (250 mg total) by mouth 4 (four) times daily.    albuterol (PROVENTIL HFA) 90 mcg/actuation inhaler Inhale 2 puffs into the lungs every 4 (four) hours as needed for Wheezing. 2 puffs two times daily    aspirin (ECOTRIN) 81 MG EC tablet Take 1 tablet (81 mg total) by mouth once daily.    atorvastatin (LIPITOR) 20 MG tablet Take 1 tablet (20 mg total) by mouth every evening.    betaxolol 0.5% (BETOPTIC-S) 0.5 % Drop Place 1 drop into both eyes 2 (two) times daily.    brimonidine 0.2% (ALPHAGAN) 0.2 % Drop INSTILL 1 DROP IN BOTH EYES TWICE DAILY    butalbital-acetaminophen-caffeine -40 mg (FIORICET, ESGIC) -40 mg per tablet Take 1 tablet by mouth every 4 (four) hours as needed for Pain or Headaches.    carboxymethylcell/hypromellose (GENTEAL GEL OPHT) Apply to eye.    diclofenac sodium (VOLTAREN) 1 % Gel Apply topically.    dorzolamide (TRUSOPT) 2 % ophthalmic solution PLACE 1 DROP INTO BOTH EYES 2 (TWO) TIMES DAILY.    fluticasone furoate-vilanterol (BREO) 100-25 mcg/dose diskus inhaler Inhale 1 puff into the lungs.    hydrOXYzine (VISTARIL) 100 MG capsule Take 1 capsule by mouth 2 (two) times daily as needed.    isosorbide mononitrate (IMDUR) 60 MG 24 hr tablet Take 1 tablet (60 mg total) by mouth once daily. (Patient taking differently: Take 30 mg by mouth once daily.)    latanoprost 0.005 % ophthalmic solution Place 1 drop into both eyes every evening.    metoprolol succinate (TOPROL-XL) 25 MG 24 hr tablet Take 1 tablet by mouth once daily.    nitroGLYCERIN (NITROSTAT) 0.4 MG SL tablet Place 1 tablet (0.4 mg total) under the tongue every 5 (five) minutes as needed for Chest pain.    ondansetron (ZOFRAN-ODT) 4 MG TbDL Take 1 tablet (4 mg total) by mouth every 6 (six) hours as needed.    oxybutynin (DITROPAN XL) 15 MG  TR24 Take 5 mg by mouth once daily.    pantoprazole (PROTONIX) 40 MG tablet Take 1 tablet by mouth every morning.    SYMBICORT 80-4.5 mcg/actuation HFAA Inhale 2 puffs into the lungs 2 (two) times daily.    tamsulosin (FLOMAX) 0.4 mg Cap Take 1 capsule by mouth once daily.    traMADoL (ULTRAM) 50 mg tablet Take 50 mg by mouth 3 (three) times daily as needed.    vitamin D (VITAMIN D3) 1000 units Tab Take by mouth.    [DISCONTINUED] hydrochlorothiazide (HYDRODIURIL) 25 MG tablet TAKE ONE TABLET BY MOUTH EVERY DAY     No current facility-administered medications on file prior to visit.       Objective:   Objective:  Wt Readings from Last 3 Encounters:   04/03/23 104.4 kg (230 lb 2.6 oz)   10/25/22 115.7 kg (255 lb 1.2 oz)   10/21/22 115.7 kg (255 lb)     Temp Readings from Last 3 Encounters:   10/25/22 99.7 °F (37.6 °C) (Oral)   09/23/22 98.3 °F (36.8 °C) (Oral)   09/23/22 97.8 °F (36.6 °C) (Temporal)     BP Readings from Last 3 Encounters:   04/03/23 113/76   10/25/22 138/65   10/21/22 128/62     Pulse Readings from Last 3 Encounters:   04/03/23 64   10/25/22 69   10/03/22 70       Physical Exam  Vitals reviewed.   Constitutional:       Appearance: She is well-developed.   HENT:      Head: Normocephalic and atraumatic.   Eyes:      General: No scleral icterus.     Conjunctiva/sclera: Conjunctivae normal.   Cardiovascular:      Rate and Rhythm: Normal rate and regular rhythm.      Pulses: Intact distal pulses.      Heart sounds: Normal heart sounds. No murmur heard.  Pulmonary:      Effort: No respiratory distress.      Breath sounds: No wheezing or rales.   Chest:      Chest wall: No tenderness.   Abdominal:      General: Bowel sounds are normal. There is no distension.      Palpations: Abdomen is soft.      Tenderness: There is no guarding.   Musculoskeletal:         General: Normal range of motion.      Cervical back: Normal range of motion and neck supple.   Skin:     General: Skin is warm.   Neurological:       Mental Status: She is alert and oriented to person, place, and time.       Lab Results   Component Value Date    CHOL 144 02/07/2017    CHOL 157 07/29/2016    CHOL 147 04/20/2015     Lab Results   Component Value Date    HDL 45 02/07/2017    HDL 48 07/29/2016    HDL 45 04/20/2015     Lab Results   Component Value Date    LDLCALC 89.4 02/07/2017    LDLCALC 98.8 07/29/2016    LDLCALC 92.0 04/20/2015     Lab Results   Component Value Date    TRIG 48 02/07/2017    TRIG 51 07/29/2016    TRIG 50 04/20/2015     Lab Results   Component Value Date    CHOLHDL 31.3 02/07/2017    CHOLHDL 30.6 07/29/2016    CHOLHDL 30.6 04/20/2015       Chemistry        Component Value Date/Time     10/25/2022 0638    K 3.5 10/25/2022 0638     10/25/2022 0638    CO2 27 10/25/2022 0638    BUN 20 10/25/2022 0638    CREATININE 1.4 10/25/2022 0638     (H) 10/25/2022 0638        Component Value Date/Time    CALCIUM 9.5 10/25/2022 0638    ALKPHOS 55 10/25/2022 0638    AST 23 10/25/2022 0638    ALT 26 10/25/2022 0638    BILITOT 0.4 10/25/2022 0638    ESTGFRAFRICA 39 (A) 07/18/2020 1425    EGFRNONAA 34 (A) 07/18/2020 1425          No results found for: TSH  Lab Results   Component Value Date    INR 1.0 09/08/2022    INR 1.1 12/07/2016    INR 1.0 01/28/2015     Lab Results   Component Value Date    WBC 5.42 10/25/2022    HGB 12.3 10/25/2022    HCT 38.8 10/25/2022    MCV 96 10/25/2022     10/25/2022     BNP  @LABRCNTIP(BNP,BNPTRIAGEBLO)@  CrCl cannot be calculated (Patient's most recent lab result is older than the maximum 7 days allowed.).     Imaging:  ======  No results found for this or any previous visit.    No results found for this or any previous visit.    No results found for this or any previous visit.    Results for orders placed during the hospital encounter of 12/07/16    X-Ray Chest PA And Lateral    Narrative  EXAM: Chest X-ray PA and Lateral    CLINICAL HISTORY:     Chest pain, unspecified    COMPARISON STUDIES:      01/28/2015    FINDINGS:  Cardiomegaly.  Tortuous descending thoracic aorta.  Lungs appear clear.  Thoracic scoliosis convex to the right.. Ribs appear intact.  IMPRESSION:  No acute findings.  Please see above      Electronically signed by: WILLI MARTINEZ MD  Date:     12/07/16  Time:    08:02    No results found for this or any previous visit.    No valid procedures specified.    Diagnostic Results:  ECG: Reviewed  Nsr., lvh      Results for orders placed during the hospital encounter of 09/23/22    Cardiac catheterization    Conclusion    The pre-procedure left ventricular end diastolic pressure was 8.    The estimated blood loss was none.    There was non-obstructive coronary artery disease..    The filling pressures on the right and left were normal.    Patent ostial LAD stent with minimal ISR    The procedure log was documented by Documenter: Sirena Ugalde RN and verified by Renee Colon MD.    Date: 9/23/2022  Time: 10:40 AM    The ASCVD Risk score (Chela DK, et al., 2019) failed to calculate for the following reasons:    Cannot find a previous HDL lab    Cannot find a previous total cholesterol lab    Assessment and Plan:   Essential hypertension    Hyperlipidemia, unspecified hyperlipidemia type    Coronary artery disease, occlusive    Asthma, unspecified asthma severity, unspecified whether complicated, unspecified whether persistent    Obesity (BMI 30-39.9)    Morbid obesity with BMI of 40.0-44.9, adult    Hypotension, unspecified hypotension type      cw asa, lipitor and toprol , imdur  DC hctz   May DC isosorbide as well in the future if hypotension does not resolve  Reviewed all tests and above medical conditions with patient in detail and formulated treatment plan.  Risk factor modification discussed.   Cardiac low salt diet discussed.  Maintaining healthy weight and weight loss goals were discussed in clinic.  Follow up in 6 months

## 2023-04-03 NOTE — PROGRESS NOTES
Subjective:       Patient ID: Kate Lira is a 67 y.o. female.    Chief Complaint:  Annual Exam    No LMP recorded. Patient is postmenopausal.  History of Present Illness  Annual Exam-Postmenopausal  Patient presents for annual exam. The patient has no complaints today. The patient is sexually active. GYN screening history: last pap: approximate date 20 and was normal and last mammogram: approximate date 10/4/22 and was normal. The patient is not taking hormone replacement therapy. Patient denies post-menopausal vaginal bleeding. The patient wears seatbelts: yes. The patient participates in regular exercise: yes. Has the patient ever been transfused or tattooed?: yes. The patient reports that there is not domestic violence in her life. Patient diagnosed with UTI last month. Reports continuing to have suprapubic tenderness and pain with urination. Scheduled to follow up with urology on Wednesday.    OB History    Para Term  AB Living   8 8 8     8   SAB IAB Ectopic Multiple Live Births           8      # Outcome Date GA Lbr Sammy/2nd Weight Sex Delivery Anes PTL Lv   8 Term  40w0d    Vag-Spont EPI     7 Term  40w0d    Vag-Spont EPI     6 Term  40w0d    Vag-Spont      5 Term  40w0d    Vag-Spont      4 Term  40w0d    Vag-Spont      3 Term  40w0d    Vag-Spont      2 Term  40w0d    Vag-Spont      1 Term  40w0d    Vag-Spont          Review of Systems  Review of Systems   Constitutional:  Negative for appetite change, fatigue, fever and unexpected weight change.   Eyes:  Negative for visual disturbance.   Cardiovascular:  Negative for chest pain.   Gastrointestinal:  Negative for abdominal pain, bloating, constipation, diarrhea, nausea and vomiting.   Genitourinary:  Positive for dysuria. Negative for bladder incontinence, dysmenorrhea, dyspareunia, flank pain, frequency, genital sores, menorrhagia, menstrual problem, pelvic pain, urgency, vaginal bleeding, vaginal discharge, vaginal pain,  postcoital bleeding, vaginal dryness and vaginal odor.        Suprapubic tenderness   Integumentary:  Negative for rash, acne, mole/lesion, breast mass, nipple discharge, breast skin changes and breast tenderness.   Neurological:  Negative for syncope and headaches.   Hematological:  Negative for adenopathy. Does not bruise/bleed easily.   All other systems reviewed and are negative.  Breast: Positive for breast self exam.Negative for asymmetry, lump, mass, nipple discharge, skin changes and tenderness         Objective:      Physical Exam:   Constitutional: She is oriented to person, place, and time. She appears well-developed and well-nourished.    HENT:   Head: Normocephalic and atraumatic.    Eyes: Pupils are equal, round, and reactive to light. Conjunctivae and EOM are normal.     Cardiovascular:  Normal rate and regular rhythm.             Pulmonary/Chest: Effort normal. Right breast exhibits no inverted nipple, no mass, no nipple discharge, no skin change, no tenderness, no bleeding and no swelling. Left breast exhibits no inverted nipple, no mass, no nipple discharge, no skin change, no tenderness, no bleeding and no swelling. Breasts are symmetrical.        Abdominal: Soft. There is abdominal tenderness in the suprapubic area. There is no right CVA tenderness and no left CVA tenderness. Hernia confirmed negative in the right inguinal area and confirmed negative in the left inguinal area.     Genitourinary:    Inguinal canal, vagina, uterus, right adnexa, left adnexa and rectum normal.      Pelvic exam was performed with patient supine.   The external female genitalia was normal.   Genitalia hair distrobution normal .   Labial bartholins normal.There is no rash, tenderness, lesion or injury on the right labia. There is no rash, tenderness, lesion or injury on the left labia. Cervix is normal. Right adnexum displays no mass, no tenderness and no fullness. Left adnexum displays no mass, no tenderness and no  fullness. No erythema,  no vaginal discharge, bleeding, rectocele, cystocele or unspecified prolapse of vaginal walls in the vagina. Cervix exhibits no motion tenderness and no friability. Uterus is not tender. Normal urethral meatus.          Musculoskeletal: Normal range of motion and moves all extremeties.      Lymphadenopathy: No inguinal adenopathy noted on the right or left side.    Neurological: She is alert and oriented to person, place, and time.    Skin: Skin is warm and dry. No rash noted. No erythema.    Psychiatric: She has a normal mood and affect. Her behavior is normal. Judgment and thought content normal.          Assessment:     1. Well woman exam with routine gynecological exam    2. Encounter for screening for cervical cancer    3. Suprapubic tenderness              Plan:   Kate was seen today for annual exam.    Diagnoses and all orders for this visit:    Well woman exam with routine gynecological exam  -     Liquid-Based Pap Smear, Screening  -     HPV High Risk Genotypes, PCR    Encounter for screening for cervical cancer  -     Liquid-Based Pap Smear, Screening  -     HPV High Risk Genotypes, PCR    Suprapubic tenderness      Recommend continued follow up with urology as scheduled.  Follow up with me in 1 year for annual well woman exam.

## 2023-04-05 ENCOUNTER — OFFICE VISIT (OUTPATIENT)
Dept: OPHTHALMOLOGY | Facility: CLINIC | Age: 68
End: 2023-04-05
Payer: MEDICARE

## 2023-04-05 DIAGNOSIS — H40.1123 PRIMARY OPEN ANGLE GLAUCOMA (POAG) OF LEFT EYE, SEVERE STAGE: Primary | ICD-10-CM

## 2023-04-05 DIAGNOSIS — Z91.199 NON-COMPLIANCE: ICD-10-CM

## 2023-04-05 DIAGNOSIS — H04.129 DRY EYE: ICD-10-CM

## 2023-04-05 DIAGNOSIS — H40.1111 PRIMARY OPEN ANGLE GLAUCOMA (POAG) OF RIGHT EYE, MILD STAGE: ICD-10-CM

## 2023-04-05 DIAGNOSIS — H25.13 NUCLEAR SCLEROSIS, BILATERAL: ICD-10-CM

## 2023-04-05 PROCEDURE — 1159F MED LIST DOCD IN RCRD: CPT | Mod: CPTII,S$GLB,, | Performed by: OPHTHALMOLOGY

## 2023-04-05 PROCEDURE — 99214 PR OFFICE/OUTPT VISIT, EST, LEVL IV, 30-39 MIN: ICD-10-PCS | Mod: S$GLB,,, | Performed by: OPHTHALMOLOGY

## 2023-04-05 PROCEDURE — 99999 PR PBB SHADOW E&M-EST. PATIENT-LVL III: ICD-10-PCS | Mod: PBBFAC,,, | Performed by: OPHTHALMOLOGY

## 2023-04-05 PROCEDURE — 1160F PR REVIEW ALL MEDS BY PRESCRIBER/CLIN PHARMACIST DOCUMENTED: ICD-10-PCS | Mod: CPTII,S$GLB,, | Performed by: OPHTHALMOLOGY

## 2023-04-05 PROCEDURE — 1159F PR MEDICATION LIST DOCUMENTED IN MEDICAL RECORD: ICD-10-PCS | Mod: CPTII,S$GLB,, | Performed by: OPHTHALMOLOGY

## 2023-04-05 PROCEDURE — 1160F RVW MEDS BY RX/DR IN RCRD: CPT | Mod: CPTII,S$GLB,, | Performed by: OPHTHALMOLOGY

## 2023-04-05 PROCEDURE — 99214 OFFICE O/P EST MOD 30 MIN: CPT | Mod: S$GLB,,, | Performed by: OPHTHALMOLOGY

## 2023-04-05 PROCEDURE — 99999 PR PBB SHADOW E&M-EST. PATIENT-LVL III: CPT | Mod: PBBFAC,,, | Performed by: OPHTHALMOLOGY

## 2023-04-05 NOTE — PROGRESS NOTES
HPI     Glaucoma            Comments: Pt reports for 2m IOP check. Denies any pain or irritation. Va   stable. 100% compliant with gtts. Pt reports Some tinnitus in ears since   starting DMX tablets           Comments    Primary open angle glaucoma (POAG) of left eye, severe stage   Primary open angle glaucoma (POAG) of right eye, mild stage   Nuclear sclerosis, bilateral   Myopia with astigmatism and presbyopia, bilateral   SLT OS 7/26/22    GCL: 26/20 10/2021  Gcl: 25/19 10/19/2022      Latanoprost more effective than Lumigan  **DMX causing low BP per cardiologist**      *FLOMAX*       Rocklatan qd OU  Dorzolamide BID OU   Brimonidine OU TID   Betaxalol BID OU  Genteal Gel QHS OU  DMX 1/2 250 pill TID PO            Last edited by Noble Guillory MD on 4/5/2023  8:56 AM.            Assessment /Plan     For exam results, see Encounter Report.      ICD-10-CM ICD-9-CM    1. Primary open angle glaucoma (POAG) of left eye, severe stage  H40.1123 365.11 UNCONTROLLED IOP OS   PT IS ON MAX MEDICATION TREATMENT AND SURGERY IS RECOMMENDED - TRAB OS     PT DESIRES TO WAIT FOR SURGERY AT THIS TIME AND RE-CHECK IN 1 MONTH   PT UNDERSTANDS RISKS AND BENEFITS OF WAITING ON SURGERY     365.73       2. Primary open angle glaucoma (POAG) of right eye, mild stage  H40.1111 365.11 SEE ABOVE      365.71       3. Dry eye  H04.129 375.15 CONTINUE TEARS       4. Non-compliance  Z91.199 V15.81         5.  Cataracts OU - follow at this time     Return to clinic 1 MONTH, MR, GLAONEYDA AND DOA      Rocklatan qd OU  Dorzolamide BID OU   Brimonidine OU TID   Betaxalol BID OU  Genteal Gel QHS OU  DMX 1/2 250 pill TID PO

## 2023-04-11 LAB
FINAL PATHOLOGIC DIAGNOSIS: NORMAL
Lab: NORMAL

## 2023-05-10 ENCOUNTER — OFFICE VISIT (OUTPATIENT)
Dept: OPHTHALMOLOGY | Facility: CLINIC | Age: 68
End: 2023-05-10
Payer: MEDICARE

## 2023-05-10 DIAGNOSIS — Z91.199 NON-COMPLIANCE: ICD-10-CM

## 2023-05-10 DIAGNOSIS — H40.1111 PRIMARY OPEN ANGLE GLAUCOMA (POAG) OF RIGHT EYE, MILD STAGE: ICD-10-CM

## 2023-05-10 DIAGNOSIS — H25.13 NUCLEAR SCLEROSIS, BILATERAL: ICD-10-CM

## 2023-05-10 DIAGNOSIS — H04.129 DRY EYE: ICD-10-CM

## 2023-05-10 DIAGNOSIS — H40.1123 PRIMARY OPEN ANGLE GLAUCOMA (POAG) OF LEFT EYE, SEVERE STAGE: Primary | ICD-10-CM

## 2023-05-10 PROCEDURE — 1160F PR REVIEW ALL MEDS BY PRESCRIBER/CLIN PHARMACIST DOCUMENTED: ICD-10-PCS | Mod: CPTII,S$GLB,, | Performed by: OPHTHALMOLOGY

## 2023-05-10 PROCEDURE — 99999 PR PBB SHADOW E&M-EST. PATIENT-LVL II: ICD-10-PCS | Mod: PBBFAC,,, | Performed by: OPHTHALMOLOGY

## 2023-05-10 PROCEDURE — 99214 OFFICE O/P EST MOD 30 MIN: CPT | Mod: S$GLB,,, | Performed by: OPHTHALMOLOGY

## 2023-05-10 PROCEDURE — 99999 PR PBB SHADOW E&M-EST. PATIENT-LVL II: CPT | Mod: PBBFAC,,, | Performed by: OPHTHALMOLOGY

## 2023-05-10 PROCEDURE — 1160F RVW MEDS BY RX/DR IN RCRD: CPT | Mod: CPTII,S$GLB,, | Performed by: OPHTHALMOLOGY

## 2023-05-10 PROCEDURE — 99214 PR OFFICE/OUTPT VISIT, EST, LEVL IV, 30-39 MIN: ICD-10-PCS | Mod: S$GLB,,, | Performed by: OPHTHALMOLOGY

## 2023-05-10 PROCEDURE — 1159F MED LIST DOCD IN RCRD: CPT | Mod: CPTII,S$GLB,, | Performed by: OPHTHALMOLOGY

## 2023-05-10 PROCEDURE — 1159F PR MEDICATION LIST DOCUMENTED IN MEDICAL RECORD: ICD-10-PCS | Mod: CPTII,S$GLB,, | Performed by: OPHTHALMOLOGY

## 2023-05-10 NOTE — PROGRESS NOTES
HPI     Glaucoma            Comments: Here for IOP check MR, glare and dilation.          Comments    Here for IOP check MR, glare and dilation.    No changes in vision or other complaints.    Primary open angle glaucoma (POAG) of left eye, severe stage   Primary open angle glaucoma (POAG) of right eye, mild stage   Nuclear sclerosis, bilateral   Myopia with astigmatism and presbyopia, bilateral   SLT OS 7/26/22    /472     GCL: 26/20 10/2021  Gcl: 25/19 10/19/2022    + ASTHMA     Latanoprost more effective than Lumigan  **DMX causing low BP per cardiologist**      *FLOMAX*     Latanoprost qhs OU  Dorzolamide BID OU   Brimonidine OU TID   Betaxalol BID OU  Genteal Gel QHS OU  DMX 1/2 250 pill TID PO          Last edited by Noble Guillory MD on 5/10/2023 10:16 AM.            Assessment /Plan     For exam results, see Encounter Report.      ICD-10-CM ICD-9-CM    1. Primary open angle glaucoma (POAG) of left eye, severe stage  H40.1123 365.11 IOP OS remains above target, discussed surgical intervention (likely to need xen).    Will increase DMX to QID, reassess at next visit.      365.73       2. Primary open angle glaucoma (POAG) of right eye, mild stage  H40.1111 365.11 Doing well - intraocular pressure is within acceptable range relative to target pressure with no evidence of progression.   Continue current treatment.  Reviewed importance of continued compliance with treatment and follow up.        365.71       3. Dry eye  H04.129 375.15 Findings and symptoms consistent with mild dry eyes.   Recommend regular use of Artificial Tears. These should be thought of as Ocular Surface Moisturizers similar to skin moisturizers in that regular use is required to achieve maximum benefit.  Specifically, I recommend the following:    Systane Ultra or Refresh Optive Ever 3 : 3-4 times a day.   The first dose upon awakening is most important because we do not make tears at night.  Avoid generic products as they contain  "Benzalkonium Chloride as a preservative. This is a very irritating chemical and can make your eyes worse.    Omega 3 Fish Oils  1000 to 2000 mgs per day of Nordic Naturals or PRN Dry Eye formula [facility]            4. Nuclear sclerosis, bilateral  H25.13 366.16   You were found to have an early cataract in your eye(s) today, however the cataract is not affecting your activities of daily living, such as reading and driving.You do not need  surgery at this time. We will recheck your cataract at your next visit. You are welcome to call for an earlier appointment if your vision gets worse.         5. Non-compliance  Z91.199 V15.81           Return to clinic 4-5 weeks for IOP          Latanoprost qhs OU  Rocklatan "caused twitching"  Dorzolamide BID OU   Brimonidine OU TID   Betaxalol BID OU  Genteal Gel QHS OU  DMX 1/2 250 pill QID PO      "

## 2023-05-24 ENCOUNTER — TELEPHONE (OUTPATIENT)
Dept: OPHTHALMOLOGY | Facility: CLINIC | Age: 68
End: 2023-05-24
Payer: MEDICARE

## 2023-05-24 NOTE — TELEPHONE ENCOUNTER
Spoke with patient = discussed that we are unable to send prescription for antibiotic drops without a visit - patient was unable to come in to see Dr Tubbs  today and did not want to go to [place] to see Dr Ovalles - states she will go see Urgent care       ----- Message from Vonnie Lyles sent at 5/24/2023  1:28 PM CDT -----  Patient is requesting a call back from the nurse to discuss getting antibiotics/ eye drops. Please give patient a call back at .[phone]

## 2023-06-13 ENCOUNTER — OFFICE VISIT (OUTPATIENT)
Dept: OPHTHALMOLOGY | Facility: CLINIC | Age: 68
End: 2023-06-13
Payer: MEDICARE

## 2023-06-13 DIAGNOSIS — H40.1111 PRIMARY OPEN ANGLE GLAUCOMA (POAG) OF RIGHT EYE, MILD STAGE: ICD-10-CM

## 2023-06-13 DIAGNOSIS — H40.1123 PRIMARY OPEN ANGLE GLAUCOMA (POAG) OF LEFT EYE, SEVERE STAGE: Primary | ICD-10-CM

## 2023-06-13 DIAGNOSIS — H04.129 DRY EYE: ICD-10-CM

## 2023-06-13 DIAGNOSIS — Z91.199 NON-COMPLIANCE: ICD-10-CM

## 2023-06-13 PROCEDURE — 1160F RVW MEDS BY RX/DR IN RCRD: CPT | Mod: CPTII,S$GLB,, | Performed by: OPHTHALMOLOGY

## 2023-06-13 PROCEDURE — 1159F PR MEDICATION LIST DOCUMENTED IN MEDICAL RECORD: ICD-10-PCS | Mod: CPTII,S$GLB,, | Performed by: OPHTHALMOLOGY

## 2023-06-13 PROCEDURE — 1160F PR REVIEW ALL MEDS BY PRESCRIBER/CLIN PHARMACIST DOCUMENTED: ICD-10-PCS | Mod: CPTII,S$GLB,, | Performed by: OPHTHALMOLOGY

## 2023-06-13 PROCEDURE — 99213 PR OFFICE/OUTPT VISIT, EST, LEVL III, 20-29 MIN: ICD-10-PCS | Mod: S$GLB,,, | Performed by: OPHTHALMOLOGY

## 2023-06-13 PROCEDURE — 99999 PR PBB SHADOW E&M-EST. PATIENT-LVL III: ICD-10-PCS | Mod: PBBFAC,,, | Performed by: OPHTHALMOLOGY

## 2023-06-13 PROCEDURE — 99213 OFFICE O/P EST LOW 20 MIN: CPT | Mod: S$GLB,,, | Performed by: OPHTHALMOLOGY

## 2023-06-13 PROCEDURE — 1159F MED LIST DOCD IN RCRD: CPT | Mod: CPTII,S$GLB,, | Performed by: OPHTHALMOLOGY

## 2023-06-13 PROCEDURE — 99999 PR PBB SHADOW E&M-EST. PATIENT-LVL III: CPT | Mod: PBBFAC,,, | Performed by: OPHTHALMOLOGY

## 2023-06-13 RX ORDER — ASPIRIN 81 MG/1
1 TABLET ORAL EVERY MORNING
COMMUNITY
Start: 2023-01-03

## 2023-06-13 RX ORDER — HYDROCHLOROTHIAZIDE 25 MG/1
1 TABLET ORAL EVERY MORNING
COMMUNITY
Start: 2023-01-03 | End: 2023-11-27

## 2023-06-13 RX ORDER — METOPROLOL SUCCINATE 25 MG/1
12.5 TABLET, EXTENDED RELEASE ORAL
COMMUNITY
Start: 2023-02-23

## 2023-06-13 NOTE — PROGRESS NOTES
"HPI     Glaucoma            Comments: Patient states OU is doing well,no changes in VA and using   drops as directed.           Comments    Primary open angle glaucoma (POAG) of left eye, severe stage   Primary open angle glaucoma (POAG) of right eye, mild stage   Nuclear sclerosis, bilateral   Myopia with astigmatism and presbyopia, bilateral   SLT OS 7/26/22    /472     GCL: 26/20 10/2021  Gcl: 25/19 10/19/2022    + ASTHMA     Latanoprost more effective than Lumigan  **DMX causing low BP per cardiologist**  Rocklatan "caused twitching"      *FLOMAX*     Latanoprost qhs OU  Dorzolamide BID OU   Brimonidine OU TID   Betaxalol BID OU  Genteal Gel QHS OU  DMX 1/2 250 pill BID PO          Last edited by Alma Pettit, Patient Care Assistant on 6/13/2023  9:26   AM.            Assessment /Plan     For exam results, see Encounter Report.      ICD-10-CM ICD-9-CM    1. Primary open angle glaucoma (POAG) of left eye, severe stage  H40.1123 365.11 IOP improvement after increasing DMX dose. Will further increase to 1/2 dose BID, then full dose qpm     365.73       2. Primary open angle glaucoma (POAG) of right eye, mild stage  H40.1111 365.11 As above      365.71       3. Dry eye  H04.129 375.15       4. Non-compliance  Z91.199 V15.81           Return to clinic 6-8 weeks         Latanoprost qhs OU  Dorzolamide BID OU   Brimonidine OU TID   Betaxalol BID OU  Genteal Gel QHS OU  DMX 1/2 250 pill BID, full dose qpm      "

## 2023-07-20 DIAGNOSIS — H40.1123 PRIMARY OPEN ANGLE GLAUCOMA (POAG) OF LEFT EYE, SEVERE STAGE: ICD-10-CM

## 2023-07-20 DIAGNOSIS — H40.1111 PRIMARY OPEN ANGLE GLAUCOMA (POAG) OF RIGHT EYE, MILD STAGE: ICD-10-CM

## 2023-07-20 RX ORDER — LATANOPROST 50 UG/ML
1 SOLUTION/ DROPS OPHTHALMIC NIGHTLY
Qty: 7.5 ML | Refills: 4 | Status: SHIPPED | OUTPATIENT
Start: 2023-07-20

## 2023-07-20 RX ORDER — BRIMONIDINE TARTRATE 2 MG/ML
SOLUTION/ DROPS OPHTHALMIC
Qty: 15 ML | Refills: 3 | Status: SHIPPED | OUTPATIENT
Start: 2023-07-20 | End: 2023-09-20 | Stop reason: SDUPTHER

## 2023-08-08 ENCOUNTER — OFFICE VISIT (OUTPATIENT)
Dept: OPHTHALMOLOGY | Facility: CLINIC | Age: 68
End: 2023-08-08
Payer: MEDICARE

## 2023-08-08 DIAGNOSIS — H40.1123 PRIMARY OPEN ANGLE GLAUCOMA (POAG) OF LEFT EYE, SEVERE STAGE: Primary | ICD-10-CM

## 2023-08-08 DIAGNOSIS — H25.13 NUCLEAR SCLEROSIS, BILATERAL: ICD-10-CM

## 2023-08-08 DIAGNOSIS — Z91.199 NON-COMPLIANCE: ICD-10-CM

## 2023-08-08 DIAGNOSIS — H40.1111 PRIMARY OPEN ANGLE GLAUCOMA (POAG) OF RIGHT EYE, MILD STAGE: ICD-10-CM

## 2023-08-08 DIAGNOSIS — H04.129 DRY EYE: ICD-10-CM

## 2023-08-08 PROCEDURE — 99999 PR PBB SHADOW E&M-EST. PATIENT-LVL II: CPT | Mod: PBBFAC,,, | Performed by: OPHTHALMOLOGY

## 2023-08-08 PROCEDURE — 1160F RVW MEDS BY RX/DR IN RCRD: CPT | Mod: CPTII,S$GLB,, | Performed by: OPHTHALMOLOGY

## 2023-08-08 PROCEDURE — 99214 OFFICE O/P EST MOD 30 MIN: CPT | Mod: S$GLB,,, | Performed by: OPHTHALMOLOGY

## 2023-08-08 PROCEDURE — 99999 PR PBB SHADOW E&M-EST. PATIENT-LVL II: ICD-10-PCS | Mod: PBBFAC,,, | Performed by: OPHTHALMOLOGY

## 2023-08-08 PROCEDURE — 1160F PR REVIEW ALL MEDS BY PRESCRIBER/CLIN PHARMACIST DOCUMENTED: ICD-10-PCS | Mod: CPTII,S$GLB,, | Performed by: OPHTHALMOLOGY

## 2023-08-08 PROCEDURE — 1159F PR MEDICATION LIST DOCUMENTED IN MEDICAL RECORD: ICD-10-PCS | Mod: CPTII,S$GLB,, | Performed by: OPHTHALMOLOGY

## 2023-08-08 PROCEDURE — 1159F MED LIST DOCD IN RCRD: CPT | Mod: CPTII,S$GLB,, | Performed by: OPHTHALMOLOGY

## 2023-08-08 PROCEDURE — 99214 PR OFFICE/OUTPT VISIT, EST, LEVL IV, 30-39 MIN: ICD-10-PCS | Mod: S$GLB,,, | Performed by: OPHTHALMOLOGY

## 2023-08-08 RX ORDER — ALBUTEROL SULFATE 1.25 MG/3ML
1.25 SOLUTION RESPIRATORY (INHALATION) EVERY 6 HOURS PRN
COMMUNITY
Start: 2023-07-03

## 2023-08-08 RX ORDER — OXYBUTYNIN CHLORIDE 10 MG/1
10 TABLET, EXTENDED RELEASE ORAL EVERY MORNING
COMMUNITY
Start: 2023-07-24

## 2023-09-20 ENCOUNTER — OFFICE VISIT (OUTPATIENT)
Dept: OPHTHALMOLOGY | Facility: CLINIC | Age: 68
End: 2023-09-20
Payer: MEDICARE

## 2023-09-20 DIAGNOSIS — H40.1123 PRIMARY OPEN ANGLE GLAUCOMA (POAG) OF LEFT EYE, SEVERE STAGE: Primary | ICD-10-CM

## 2023-09-20 DIAGNOSIS — H04.129 DRY EYE: ICD-10-CM

## 2023-09-20 DIAGNOSIS — H25.13 NUCLEAR SCLEROSIS, BILATERAL: ICD-10-CM

## 2023-09-20 DIAGNOSIS — H40.1111 PRIMARY OPEN ANGLE GLAUCOMA (POAG) OF RIGHT EYE, MILD STAGE: ICD-10-CM

## 2023-09-20 DIAGNOSIS — Z91.199 NON-COMPLIANCE: ICD-10-CM

## 2023-09-20 PROCEDURE — 1126F AMNT PAIN NOTED NONE PRSNT: CPT | Mod: CPTII,S$GLB,, | Performed by: OPHTHALMOLOGY

## 2023-09-20 PROCEDURE — 1159F PR MEDICATION LIST DOCUMENTED IN MEDICAL RECORD: ICD-10-PCS | Mod: CPTII,S$GLB,, | Performed by: OPHTHALMOLOGY

## 2023-09-20 PROCEDURE — 99999 PR PBB SHADOW E&M-EST. PATIENT-LVL III: CPT | Mod: PBBFAC,,, | Performed by: OPHTHALMOLOGY

## 2023-09-20 PROCEDURE — 99999 PR PBB SHADOW E&M-EST. PATIENT-LVL III: ICD-10-PCS | Mod: PBBFAC,,, | Performed by: OPHTHALMOLOGY

## 2023-09-20 PROCEDURE — 1159F MED LIST DOCD IN RCRD: CPT | Mod: CPTII,S$GLB,, | Performed by: OPHTHALMOLOGY

## 2023-09-20 PROCEDURE — 99214 OFFICE O/P EST MOD 30 MIN: CPT | Mod: S$GLB,,, | Performed by: OPHTHALMOLOGY

## 2023-09-20 PROCEDURE — 99214 PR OFFICE/OUTPT VISIT, EST, LEVL IV, 30-39 MIN: ICD-10-PCS | Mod: S$GLB,,, | Performed by: OPHTHALMOLOGY

## 2023-09-20 PROCEDURE — 1126F PR PAIN SEVERITY QUANTIFIED, NO PAIN PRESENT: ICD-10-PCS | Mod: CPTII,S$GLB,, | Performed by: OPHTHALMOLOGY

## 2023-09-20 RX ORDER — BRIMONIDINE TARTRATE 2 MG/ML
1 SOLUTION/ DROPS OPHTHALMIC 3 TIMES DAILY
Qty: 30 ML | Refills: 3 | Status: SHIPPED | OUTPATIENT
Start: 2023-09-20 | End: 2023-12-19

## 2023-09-20 RX ORDER — BETAXOLOL HYDROCHLORIDE 5 MG/ML
1 SOLUTION/ DROPS OPHTHALMIC 2 TIMES DAILY
Qty: 30 ML | Refills: 6 | Status: SHIPPED | OUTPATIENT
Start: 2023-09-20 | End: 2023-12-19

## 2023-11-27 ENCOUNTER — OFFICE VISIT (OUTPATIENT)
Dept: CARDIOLOGY | Facility: CLINIC | Age: 68
End: 2023-11-27
Payer: MEDICARE

## 2023-11-27 VITALS
DIASTOLIC BLOOD PRESSURE: 82 MMHG | BODY MASS INDEX: 36.69 KG/M2 | WEIGHT: 242.06 LBS | HEART RATE: 62 BPM | SYSTOLIC BLOOD PRESSURE: 126 MMHG | HEIGHT: 68 IN | OXYGEN SATURATION: 99 %

## 2023-11-27 DIAGNOSIS — I10 ESSENTIAL HYPERTENSION: Chronic | ICD-10-CM

## 2023-11-27 DIAGNOSIS — Z95.5 S/P CORONARY ARTERY STENT PLACEMENT: Primary | ICD-10-CM

## 2023-11-27 DIAGNOSIS — E78.5 HYPERLIPIDEMIA, UNSPECIFIED HYPERLIPIDEMIA TYPE: ICD-10-CM

## 2023-11-27 DIAGNOSIS — R07.9 CHEST PAIN, UNSPECIFIED TYPE: ICD-10-CM

## 2023-11-27 DIAGNOSIS — I95.89 OTHER SPECIFIED HYPOTENSION: ICD-10-CM

## 2023-11-27 DIAGNOSIS — E66.9 OBESITY (BMI 30-39.9): ICD-10-CM

## 2023-11-27 PROCEDURE — 3079F DIAST BP 80-89 MM HG: CPT | Mod: CPTII,S$GLB,, | Performed by: INTERNAL MEDICINE

## 2023-11-27 PROCEDURE — 3288F FALL RISK ASSESSMENT DOCD: CPT | Mod: CPTII,S$GLB,, | Performed by: INTERNAL MEDICINE

## 2023-11-27 PROCEDURE — 99214 PR OFFICE/OUTPT VISIT, EST, LEVL IV, 30-39 MIN: ICD-10-PCS | Mod: S$GLB,,, | Performed by: INTERNAL MEDICINE

## 2023-11-27 PROCEDURE — 3074F SYST BP LT 130 MM HG: CPT | Mod: CPTII,S$GLB,, | Performed by: INTERNAL MEDICINE

## 2023-11-27 PROCEDURE — 3079F PR MOST RECENT DIASTOLIC BLOOD PRESSURE 80-89 MM HG: ICD-10-PCS | Mod: CPTII,S$GLB,, | Performed by: INTERNAL MEDICINE

## 2023-11-27 PROCEDURE — 3008F PR BODY MASS INDEX (BMI) DOCUMENTED: ICD-10-PCS | Mod: CPTII,S$GLB,, | Performed by: INTERNAL MEDICINE

## 2023-11-27 PROCEDURE — 99214 OFFICE O/P EST MOD 30 MIN: CPT | Mod: S$GLB,,, | Performed by: INTERNAL MEDICINE

## 2023-11-27 PROCEDURE — 1101F PT FALLS ASSESS-DOCD LE1/YR: CPT | Mod: CPTII,S$GLB,, | Performed by: INTERNAL MEDICINE

## 2023-11-27 PROCEDURE — 93010 EKG 12-LEAD: ICD-10-PCS | Mod: S$GLB,,, | Performed by: INTERNAL MEDICINE

## 2023-11-27 PROCEDURE — 1159F MED LIST DOCD IN RCRD: CPT | Mod: CPTII,S$GLB,, | Performed by: INTERNAL MEDICINE

## 2023-11-27 PROCEDURE — 3044F HG A1C LEVEL LT 7.0%: CPT | Mod: CPTII,S$GLB,, | Performed by: INTERNAL MEDICINE

## 2023-11-27 PROCEDURE — 1159F PR MEDICATION LIST DOCUMENTED IN MEDICAL RECORD: ICD-10-PCS | Mod: CPTII,S$GLB,, | Performed by: INTERNAL MEDICINE

## 2023-11-27 PROCEDURE — 93010 ELECTROCARDIOGRAM REPORT: CPT | Mod: S$GLB,,, | Performed by: INTERNAL MEDICINE

## 2023-11-27 PROCEDURE — 3008F BODY MASS INDEX DOCD: CPT | Mod: CPTII,S$GLB,, | Performed by: INTERNAL MEDICINE

## 2023-11-27 PROCEDURE — 1126F PR PAIN SEVERITY QUANTIFIED, NO PAIN PRESENT: ICD-10-PCS | Mod: CPTII,S$GLB,, | Performed by: INTERNAL MEDICINE

## 2023-11-27 PROCEDURE — 1126F AMNT PAIN NOTED NONE PRSNT: CPT | Mod: CPTII,S$GLB,, | Performed by: INTERNAL MEDICINE

## 2023-11-27 PROCEDURE — 93005 ELECTROCARDIOGRAM TRACING: CPT

## 2023-11-27 PROCEDURE — 99999 PR PBB SHADOW E&M-EST. PATIENT-LVL III: CPT | Mod: PBBFAC,,, | Performed by: INTERNAL MEDICINE

## 2023-11-27 PROCEDURE — 99999 PR PBB SHADOW E&M-EST. PATIENT-LVL III: ICD-10-PCS | Mod: PBBFAC,,, | Performed by: INTERNAL MEDICINE

## 2023-11-27 PROCEDURE — 1101F PR PT FALLS ASSESS DOC 0-1 FALLS W/OUT INJ PAST YR: ICD-10-PCS | Mod: CPTII,S$GLB,, | Performed by: INTERNAL MEDICINE

## 2023-11-27 PROCEDURE — 3044F PR MOST RECENT HEMOGLOBIN A1C LEVEL <7.0%: ICD-10-PCS | Mod: CPTII,S$GLB,, | Performed by: INTERNAL MEDICINE

## 2023-11-27 PROCEDURE — 3288F PR FALLS RISK ASSESSMENT DOCUMENTED: ICD-10-PCS | Mod: CPTII,S$GLB,, | Performed by: INTERNAL MEDICINE

## 2023-11-27 PROCEDURE — 3074F PR MOST RECENT SYSTOLIC BLOOD PRESSURE < 130 MM HG: ICD-10-PCS | Mod: CPTII,S$GLB,, | Performed by: INTERNAL MEDICINE

## 2023-11-27 RX ORDER — ISOSORBIDE MONONITRATE 30 MG/1
30 TABLET, EXTENDED RELEASE ORAL DAILY
Qty: 30 TABLET | Refills: 11 | Status: SHIPPED | OUTPATIENT
Start: 2023-11-27 | End: 2023-11-27

## 2023-11-27 RX ORDER — FLUTICASONE FUROATE, UMECLIDINIUM BROMIDE AND VILANTEROL TRIFENATATE 100; 62.5; 25 UG/1; UG/1; UG/1
1 POWDER RESPIRATORY (INHALATION)
COMMUNITY

## 2023-11-27 RX ORDER — MIDODRINE HYDROCHLORIDE 2.5 MG/1
2.5 TABLET ORAL DAILY PRN
Qty: 30 TABLET | Refills: 11 | Status: SHIPPED | OUTPATIENT
Start: 2023-11-27

## 2023-11-27 RX ORDER — ORAL SEMAGLUTIDE 3 MG/1
1 TABLET ORAL
COMMUNITY
Start: 2023-10-03 | End: 2024-01-02

## 2023-11-27 NOTE — PROGRESS NOTES
Subjective:   Patient ID:  Kate Lira is a 68 y.o. female who presents for evaluation of No chief complaint on file.        HPI  11.27.2023  Comes in for six-month follow-up.    She states that she has chest pain when she moves her arms up.  She is tender also on exam and pain was reproducible with arm movement.    She had a nonobstructive catheterization a year ago.    Her pain does not seem to be angina  She also states that her pressure has been dropping low.  Her primary care physician lowered her isosorbide to 30 and stopped her HCTZ however she states that she still gets episodes of hypotension in the 80s.    She is also taking multiple medications for her glaucoma.  Including acetazolamide and eyedrops.    However no syncope.  No dyspnea on exertion.    No lower extremity swelling.        4.3.2023  Comes in for a 6months follow up  Started diamox as she states for her eye condition   Since then seeing low BP   Her PCP lower toprol to 12.5 mg daily   Still notices low bp at times and feels weak with it sometimes   No syncope   No other cardiac complaints     10.30.2022  Comes in for follow up after cath   History of ostial LAD stent.  With normal nuclear stress however abnormal transient ischemic dilation.    Her coronary angiogram showed minimal to mild ISR of her LAD stent.  With mild disease in the LAD otherwise no other significant finding.    She presented the same day again to the hospital with headache.  A CT of the head showed right more than left-sided microvascular changes.    Otherwise no other findings.    She denies any chest pain.  No dyspnea on exertion.  Her groin access site is good.  No other complaints today.      7.25.2022  65 yo female, ostial LAD stent in 2015 with Dr Bateman, no other residual lesions   Had a normal nuclear stress in 2019   Review of her last office visit, mention of sticking underneath the left breast chest pain followed by normal nuc in 2019   Today still  "complains of moderate chest pain, described as pressure and sharp, mostly for the last week, sometimes " sticks me" , but can be worse with exertion , radiates toward her left shoulder, it was mildy reproducible with lifting arm against resistance  She also complains of THAKKAR NYHA 1 ,  no leg swelling or orthopnea  Denies snoring , or daytime sleepiness   BP mildly elevated today   She does also have asthma but denies any attacks or wheezing     Past Medical History:   Diagnosis Date    Arthritis     Asthma     GERD (gastroesophageal reflux disease)     Glaucoma     Hypertension        Past Surgical History:   Procedure Laterality Date    CATHETERIZATION OF BOTH LEFT AND RIGHT HEART N/A 2022    Procedure: CATHETERIZATION, HEART, BOTH LEFT AND RIGHT;  Surgeon: Renee Colon MD;  Location: [facility] CATH LAB;  Service: Cardiology;  Laterality: N/A;    CHOLECYSTECTOMY      CORONARY ANGIOGRAPHY N/A 2022    Procedure: ANGIOGRAM, CORONARY ARTERY;  Surgeon: Renee Colon MD;  Location: [facility] CATH LAB;  Service: Cardiology;  Laterality: N/A;    CORONARY ANGIOPLASTY  2015    INJECTION OF ANESTHETIC AGENT AROUND MEDIAL BRANCH NERVES INNERVATING LUMBAR FACET JOINT Bilateral 2020    Procedure: Bilateral L3-5 MBB with local;  Surgeon: Roger Bhandari MD;  Location: [facility];  Service: Pain Management;  Laterality: Bilateral;    KNEE SURGERY      left    TUBAL LIGATION         Social History     Tobacco Use    Smoking status: Never    Smokeless tobacco: Never   Substance Use Topics    Alcohol use: No    Drug use: No       Family History   Problem Relation Age of Onset    Hypertension Mother     Heart disease Mother     Heart attack Mother 64        MI    Diabetes Father     Osteoarthritis Sister     Heart disease Sister     Cancer Sister         1  sister had cervical ca    Osteoarthritis Brother     Heart disease Brother        Review of Systems   Cardiovascular:  Negative for chest pain, dyspnea " on exertion, palpitations and syncope.   Genitourinary: Negative.    Neurological: Negative.        Current Outpatient Medications on File Prior to Visit   Medication Sig    acetaZOLAMIDE (DIAMOX) 250 MG tablet Take 1 tablet (250 mg total) by mouth 4 (four) times daily.    albuterol (ACCUNEB) 1.25 mg/3 mL Nebu Inhale 1.25 mg into the lungs every 6 (six) hours as needed.    albuterol (PROVENTIL HFA) 90 mcg/actuation inhaler Inhale 2 puffs into the lungs every 4 (four) hours as needed for Wheezing. 2 puffs two times daily    aspirin (ECOTRIN) 81 MG EC tablet Take 1 tablet (81 mg total) by mouth once daily.    aspirin (ECOTRIN) 81 MG EC tablet Take 1 tablet by mouth every morning.    atorvastatin (LIPITOR) 20 MG tablet Take 1 tablet (20 mg total) by mouth every evening.    betaxolol 0.5% (BETOPTIC-S) 0.5 % Drop Place 1 drop into both eyes 2 (two) times daily.    brimonidine 0.2% (ALPHAGAN) 0.2 % Drop Place 1 drop into both eyes 3 (three) times daily.    butalbital-acetaminophen-caffeine -40 mg (FIORICET, ESGIC) -40 mg per tablet Take 1 tablet by mouth every 4 (four) hours as needed for Pain or Headaches.    carboxymethylcell/hypromellose (GENTEAL GEL OPHT) Apply to eye.    diclofenac sodium (VOLTAREN) 1 % Gel Apply topically.    dorzolamide (TRUSOPT) 2 % ophthalmic solution INSTILL 1 DROP INTO BOTH EYES TWICE DAILY    fluticasone furoate-vilanterol (BREO) 100-25 mcg/dose diskus inhaler Inhale 1 puff into the lungs.    latanoprost 0.005 % ophthalmic solution PLACE 1 DROP INTO BOTH EYES EVERY EVENING.    metoprolol succinate (TOPROL-XL) 25 MG 24 hr tablet Take 12.5 mg by mouth.    nitroGLYCERIN (NITROSTAT) 0.4 MG SL tablet Place 1 tablet (0.4 mg total) under the tongue every 5 (five) minutes as needed for Chest pain.    oxybutynin (DITROPAN-XL) 10 MG 24 hr tablet Take 10 mg by mouth every morning.    pantoprazole (PROTONIX) 40 MG tablet Take 1 tablet by mouth every morning.    RYBELSUS 3 mg tablet Take 1  tablet by mouth.    SYMBICORT 80-4.5 mcg/actuation HFAA Inhale 2 puffs into the lungs 2 (two) times daily.    tamsulosin (FLOMAX) 0.4 mg Cap Take 1 capsule by mouth once daily.    TRELEGY ELLIPTA 100-62.5-25 mcg DsDv Inhale 1 puff into the lungs.    acetaminophen (TYLENOL) 650 MG TbSR Take 650 mg by mouth every 8 (eight) hours.    empagliflozin (JARDIANCE) 10 mg tablet Take 1 tablet by mouth every morning.    hydrOXYzine (VISTARIL) 100 MG capsule Take 1 capsule by mouth 2 (two) times daily as needed.    ondansetron (ZOFRAN-ODT) 4 MG TbDL Take 1 tablet (4 mg total) by mouth every 6 (six) hours as needed. (Patient not taking: Reported on 11/27/2023)    oxybutynin (DITROPAN XL) 15 MG TR24 Take 5 mg by mouth once daily.    traMADoL (ULTRAM) 50 mg tablet Take 50 mg by mouth 3 (three) times daily as needed.    vitamin D (VITAMIN D3) 1000 units Tab Take by mouth.    [DISCONTINUED] hydroCHLOROthiazide (HYDRODIURIL) 25 MG tablet Take 1 tablet by mouth every morning.    [DISCONTINUED] isosorbide mononitrate (IMDUR) 60 MG 24 hr tablet Take 1 tablet (60 mg total) by mouth once daily. (Patient not taking: Reported on 11/27/2023)     No current facility-administered medications on file prior to visit.       Objective:   Objective:  Wt Readings from Last 3 Encounters:   11/27/23 109.8 kg (242 lb 1 oz)   04/03/23 104.1 kg (229 lb 8 oz)   04/03/23 104.4 kg (230 lb 2.6 oz)     Temp Readings from Last 3 Encounters:   10/25/22 99.7 °F (37.6 °C) (Oral)   09/23/22 98.3 °F (36.8 °C) (Oral)   09/23/22 97.8 °F (36.6 °C) (Temporal)     BP Readings from Last 3 Encounters:   11/27/23 126/82   04/03/23 100/60   04/03/23 113/76     Pulse Readings from Last 3 Encounters:   11/27/23 62   04/03/23 64   10/25/22 69       Physical Exam  Vitals reviewed.   Constitutional:       Appearance: She is well-developed.   Neck:      Vascular: No carotid bruit.   Cardiovascular:      Rate and Rhythm: Normal rate and regular rhythm.      Pulses: Intact distal  pulses.      Heart sounds: Normal heart sounds. No murmur heard.  Pulmonary:      Breath sounds: Normal breath sounds.   Neurological:      Mental Status: She is oriented to person, place, and time.         Lab Results   Component Value Date    CHOL 133 10/03/2023    CHOL 131 09/30/2022    CHOL 138 09/27/2021     Lab Results   Component Value Date    HDL 51 10/03/2023    HDL 43 09/30/2022    HDL 42 09/27/2021     Lab Results   Component Value Date    LDLCALC 71 10/03/2023    LDLCALC 75 09/30/2022    LDLCALC 84 09/27/2021     Lab Results   Component Value Date    TRIG 48 10/03/2023    TRIG 60 09/30/2022    TRIG 54 09/27/2021     Lab Results   Component Value Date    CHOLHDL 31.3 02/07/2017    CHOLHDL 30.6 07/29/2016    CHOLHDL 30.6 04/20/2015       Chemistry        Component Value Date/Time     10/25/2022 0638    K 3.5 10/25/2022 0638     10/25/2022 0638    CO2 27 10/25/2022 0638    BUN 20 10/25/2022 0638    CREATININE 1.4 10/25/2022 0638     (H) 10/25/2022 0638        Component Value Date/Time    CALCIUM 9.5 10/25/2022 0638    ALKPHOS 55 10/25/2022 0638    AST 23 10/25/2022 0638    ALT 26 10/25/2022 0638    BILITOT 0.4 10/25/2022 0638    ESTGFRAFRICA 39 (A) 07/18/2020 1425    EGFRNONAA 34 (A) 07/18/2020 1425          Lab Results   Component Value Date    TSH 1.640 10/03/2023     Lab Results   Component Value Date    INR 1.0 09/08/2022    INR 1.1 12/07/2016    INR 1.0 01/28/2015     Lab Results   Component Value Date    WBC 5.0 10/03/2023    HGB 13.3 10/03/2023    HCT 42.8 10/03/2023    MCV 96 10/25/2022     (L) 10/03/2023     BNP  @LABRCNTIP(BNP,BNPTRIAGEBLO)@  CrCl cannot be calculated (Patient's most recent lab result is older than the maximum 7 days allowed.).     Imaging:  ======  No results found for this or any previous visit.    No results found for this or any previous visit.    No results found for this or any previous visit.    Results for orders placed during the hospital  encounter of 12/07/16    X-Ray Chest PA And Lateral    Narrative  EXAM: Chest X-ray PA and Lateral    CLINICAL HISTORY:     Chest pain, unspecified    COMPARISON STUDIES:     01/28/2015    FINDINGS:  Cardiomegaly.  Tortuous descending thoracic aorta.  Lungs appear clear.  Thoracic scoliosis convex to the right.. Ribs appear intact.  IMPRESSION:  No acute findings.  Please see above      Electronically signed by: WILLI MARTINEZ MD  Date:     12/07/16  Time:    08:02    No results found for this or any previous visit.    No valid procedures specified.    Diagnostic Results:  ECG: Reviewed  Nsr., lvh      Results for orders placed during the hospital encounter of 09/23/22    Cardiac catheterization    Conclusion    The pre-procedure left ventricular end diastolic pressure was 8.    The estimated blood loss was none.    There was non-obstructive coronary artery disease..    The filling pressures on the right and left were normal.    Patent ostial LAD stent with minimal ISR    The procedure log was documented by Documenter: Sirena Ugaled RN and verified by Renee Colon MD.    Date: 9/23/2022  Time: 10:40 AM    The 10-year ASCVD risk score (Electra DK, et al., 2019) is: 7.6%    Values used to calculate the score:      Age: 68 years      Sex: Female      Is Non- : Yes      Diabetic: No      Tobacco smoker: No      Systolic Blood Pressure: 126 mmHg      Is BP treated: Yes      HDL Cholesterol: 51 mg/dL      Total Cholesterol: 133 mg/dL    Assessment and Plan:   S/P coronary artery stent placement  -     IN OFFICE EKG 12-LEAD (to Muse)    Chest pain, unspecified type  -     Discontinue: isosorbide mononitrate (IMDUR) 30 MG 24 hr tablet; Take 1 tablet (30 mg total) by mouth once daily.  Dispense: 30 tablet; Refill: 11    Other specified hypotension  -     midodrine (PROAMATINE) 2.5 MG Tab; Take 1 tablet (2.5 mg total) by mouth daily as needed (BP below 90).  Dispense: 30 tablet; Refill:  11    Essential hypertension    Hyperlipidemia, unspecified hyperlipidemia type    Obesity (BMI 30-39.9)      cw asa, lipitor and toprol ,  We will stop Imdur.  We will add midodrine p.r.n.  Noncardiac chest pain.  Mild Nonobstructive CAD on catheterization a year ago.  Reviewed all tests and above medical conditions with patient in detail and formulated treatment plan.  Risk factor modification discussed.   Cardiac low salt diet discussed.  Maintaining healthy weight and weight loss goals were discussed in clinic.  Follow up in 6 months

## 2024-02-21 ENCOUNTER — OFFICE VISIT (OUTPATIENT)
Dept: OPHTHALMOLOGY | Facility: CLINIC | Age: 69
End: 2024-02-21
Payer: MEDICARE

## 2024-02-21 DIAGNOSIS — H40.1123 PRIMARY OPEN ANGLE GLAUCOMA (POAG) OF LEFT EYE, SEVERE STAGE: ICD-10-CM

## 2024-02-21 DIAGNOSIS — H40.1111 PRIMARY OPEN ANGLE GLAUCOMA (POAG) OF RIGHT EYE, MILD STAGE: ICD-10-CM

## 2024-02-21 DIAGNOSIS — H10.33 ACUTE BACTERIAL CONJUNCTIVITIS OF BOTH EYES: Primary | ICD-10-CM

## 2024-02-21 PROCEDURE — 1160F RVW MEDS BY RX/DR IN RCRD: CPT | Mod: CPTII,S$GLB,, | Performed by: OPTOMETRIST

## 2024-02-21 PROCEDURE — 99203 OFFICE O/P NEW LOW 30 MIN: CPT | Mod: S$GLB,,, | Performed by: OPTOMETRIST

## 2024-02-21 PROCEDURE — 99999 PR PBB SHADOW E&M-EST. PATIENT-LVL I: CPT | Mod: PBBFAC,,, | Performed by: OPTOMETRIST

## 2024-02-21 PROCEDURE — 1159F MED LIST DOCD IN RCRD: CPT | Mod: CPTII,S$GLB,, | Performed by: OPTOMETRIST

## 2024-02-21 RX ORDER — MOXIFLOXACIN 5 MG/ML
1 SOLUTION/ DROPS OPHTHALMIC 3 TIMES DAILY
Qty: 3 ML | Refills: 0 | Status: SHIPPED | OUTPATIENT
Start: 2024-02-21 | End: 2024-02-28

## 2024-02-21 NOTE — PROGRESS NOTES
HPI     Annual Exam            Comments: Pt states OU feel infected. Noticing mucus discharge  Pain Scale:  7  Onset:   4 days ago  OD, OS, OU:   OU  Discharge:   yes  A.M. Matting:  yes  Itch:   yes  Redness:   yes  Photophobia:   yes  Foreign body sensation:   yes  Deep pain:   somewhat  Previous occurrence:   no  Drops:   visine           Last edited by Edna Helton on 2/21/2024 10:41 AM.            Assessment /Plan     For exam results, see Encounter Report.    Acute bacterial conjunctivitis of both eyes    Primary open angle glaucoma (POAG) of left eye, severe stage    Primary open angle glaucoma (POAG) of right eye, mild stage    Other orders  -     moxifloxacin (VIGAMOX) 0.5 % ophthalmic solution; Place 1 drop into both eyes 3 (three) times daily. for 7 days  Dispense: 3 mL; Refill: 0      Continue cool compresses  Discussed contagious nature of condition, stressed frequent handwashing and hygiene.  Significant papilla noted inferior tarsal conj OU-Consider adding a topical antihistamine if symptoms persist   Continue Latanoprost qhs OU, Dorzolamide BID OU, Brimonidine OU TID and Betaxalol BID OU  Start Vigamox tid OU for 7 days then D/c     RTC as scheduled w/ Dr. Guillory or PRN if symptoms worsen or not resolved x 1 week  Discussed above and all questions were answered.

## 2024-04-03 ENCOUNTER — OFFICE VISIT (OUTPATIENT)
Dept: OPHTHALMOLOGY | Facility: CLINIC | Age: 69
End: 2024-04-03
Payer: MEDICARE

## 2024-04-03 DIAGNOSIS — H40.1123 PRIMARY OPEN ANGLE GLAUCOMA (POAG) OF LEFT EYE, SEVERE STAGE: Primary | ICD-10-CM

## 2024-04-03 DIAGNOSIS — H10.33 ACUTE BACTERIAL CONJUNCTIVITIS OF BOTH EYES: ICD-10-CM

## 2024-04-03 DIAGNOSIS — H04.129 DRY EYE: ICD-10-CM

## 2024-04-03 DIAGNOSIS — H40.1111 PRIMARY OPEN ANGLE GLAUCOMA (POAG) OF RIGHT EYE, MILD STAGE: ICD-10-CM

## 2024-04-03 PROCEDURE — 1159F MED LIST DOCD IN RCRD: CPT | Mod: CPTII,S$GLB,, | Performed by: OPHTHALMOLOGY

## 2024-04-03 PROCEDURE — 99214 OFFICE O/P EST MOD 30 MIN: CPT | Mod: S$GLB,,, | Performed by: OPHTHALMOLOGY

## 2024-04-03 PROCEDURE — 1160F RVW MEDS BY RX/DR IN RCRD: CPT | Mod: CPTII,S$GLB,, | Performed by: OPHTHALMOLOGY

## 2024-04-03 PROCEDURE — 99999 PR PBB SHADOW E&M-EST. PATIENT-LVL I: CPT | Mod: PBBFAC,,, | Performed by: OPHTHALMOLOGY

## 2024-04-03 NOTE — PROGRESS NOTES
"HPI     Follow-up            Comments: 3m IOP ck            Comments    Stable vision and eyes feel much better. Compliant with gtts, finished   vigamox meds.      Primary open angle glaucoma (POAG) of left eye, severe stage   Primary open angle glaucoma (POAG) of right eye, mild stage   Nuclear sclerosis, bilateral   Myopia with astigmatism and presbyopia, bilateral   SLT OS 7/26/22    /472     GCL: 26/20 10/2021  Gcl: 25/19 10/19/2022    + ASTHMA     Latanoprost more effective than Lumigan  **DMX causing low BP per cardiologist**  Rocklatan "caused twitching"      *FLOMAX*   + ASA       Latanoprost qhs OU  Dorzolamide BID OU   Brimonidine OU TID   Betaxalol BID OU  Genteal Gel QHS OU  DMX 1/2 250 pill QID             Last edited by Tabitha Negron on 4/3/2024  9:12 AM.            Assessment /Plan     For exam results, see Encounter Report.      ICD-10-CM ICD-9-CM    1. Primary open angle glaucoma (POAG) of left eye, severe stage  H40.1123 365.11 Iop above ideal OS - discussed      365.73       2. Primary open angle glaucoma (POAG) of right eye, mild stage  H40.1111 365.11 Iop controlled     365.71       3. Acute bacterial conjunctivitis of both eyes  H10.33 372.03 Resolved       4. Dry eye  H04.129 375.15 Tolerating meds          States she will increase compliance and recheck pressure. Has missed the latanoprost drop lately.   Latanoprost qhs OU  Dorzolamide BID OU   Brimonidine OU TID   Betaxalol BID OU  Genteal Gel QHS OU  DMX 1/2 250 pill bid  Return to clinic 1 month for dilation and to consider trab OS                "

## 2024-05-17 ENCOUNTER — OFFICE VISIT (OUTPATIENT)
Dept: OPHTHALMOLOGY | Facility: CLINIC | Age: 69
End: 2024-05-17
Payer: MEDICARE

## 2024-05-17 DIAGNOSIS — H04.129 DRY EYE: ICD-10-CM

## 2024-05-17 DIAGNOSIS — H10.33 ACUTE BACTERIAL CONJUNCTIVITIS OF BOTH EYES: ICD-10-CM

## 2024-05-17 DIAGNOSIS — H40.1111 PRIMARY OPEN ANGLE GLAUCOMA (POAG) OF RIGHT EYE, MILD STAGE: ICD-10-CM

## 2024-05-17 DIAGNOSIS — H40.1123 PRIMARY OPEN ANGLE GLAUCOMA (POAG) OF LEFT EYE, SEVERE STAGE: Primary | ICD-10-CM

## 2024-05-17 PROCEDURE — 1159F MED LIST DOCD IN RCRD: CPT | Mod: CPTII,S$GLB,, | Performed by: OPHTHALMOLOGY

## 2024-05-17 PROCEDURE — 1126F AMNT PAIN NOTED NONE PRSNT: CPT | Mod: CPTII,S$GLB,, | Performed by: OPHTHALMOLOGY

## 2024-05-17 PROCEDURE — 1160F RVW MEDS BY RX/DR IN RCRD: CPT | Mod: CPTII,S$GLB,, | Performed by: OPHTHALMOLOGY

## 2024-05-17 PROCEDURE — 3044F HG A1C LEVEL LT 7.0%: CPT | Mod: CPTII,S$GLB,, | Performed by: OPHTHALMOLOGY

## 2024-05-17 PROCEDURE — 92133 CPTRZD OPH DX IMG PST SGM ON: CPT | Mod: S$GLB,,, | Performed by: OPHTHALMOLOGY

## 2024-05-17 PROCEDURE — 99214 OFFICE O/P EST MOD 30 MIN: CPT | Mod: S$GLB,,, | Performed by: OPHTHALMOLOGY

## 2024-05-17 PROCEDURE — 99999 PR PBB SHADOW E&M-EST. PATIENT-LVL II: CPT | Mod: PBBFAC,,, | Performed by: OPHTHALMOLOGY

## 2024-05-17 RX ORDER — KETOROLAC TROMETHAMINE 5 MG/ML
1 SOLUTION OPHTHALMIC 4 TIMES DAILY
Qty: 5 ML | Refills: 0 | Status: SHIPPED | OUTPATIENT
Start: 2024-05-17 | End: 2024-05-22

## 2024-05-17 NOTE — PROGRESS NOTES
"HPI     Glaucoma            Comments: 1 MO IOP and DFE: Pt states she is using drops as directed, has   not forgot any. States no pain or irritation today. States she is using   the Brimonidine morning and night, not 3 times daily.          Comments    Primary open angle glaucoma (POAG) of left eye, severe stage   Primary open angle glaucoma (POAG) of right eye, mild stage   Nuclear sclerosis, bilateral   Myopia with astigmatism and presbyopia, bilateral   SLT OS 7/26/22    /472     GCL: 26/20 10/2021  Gcl: 25/19 10/19/2022    + ASTHMA     Latanoprost more effective than Lumigan  **DMX causing low BP per cardiologist**  Rocklatan "caused twitching"      *FLOMAX*   + ASA       Latanoprost qhs OU  Dorzolamide BID OU   Brimonidine OU TID   Betaxalol BID OU  Genteal Gel QHS OU             Last edited by Rosaura Lim on 5/17/2024  9:13 AM.            Assessment /Plan     For exam results, see Encounter Report.      ICD-10-CM ICD-9-CM    1. Primary open angle glaucoma (POAG) of left eye, severe stage  H40.1123 365.11 Iop better with improved compliance OS      365.73       2. Primary open angle glaucoma (POAG) of right eye, mild stage  H40.1111 365.11 GCL loss noted on OCT. Readjusted target IOP to 12. Due to low corneal thickness, will proceed with SLT OD. Will monitor OS for now. If IOP increases, will consider xen gel stent in the future.      IOP not within acceptable range relative to target IOP with risk of irreversible visual loss. Additional treatment required.  Discussed options, risks, and benefits of additional medication, SLT laser, or incisional glaucoma surgery.     Recommend: SLT OD    Patient chooses the above     Reviewed importance of continued compliance with treatment and follow up.      365.71       3. Acute bacterial conjunctivitis of both eyes  H10.33 372.03 resolved      4. Dry eye  H04.129 375.15 Severe keratopathy - Advised to continue using gel          Latanoprost qhs OU  Dorzolamide BID " OU   Brimonidine OU TID   Betaxalol BID OU  Genteal Gel QHS OU                  Elidel Counseling: Patient may experience a mild burning sensation during topical application. Elidel is not approved in children less than 2 years of age. There have been case reports of hematologic and skin malignancies in patients using topical calcineurin inhibitors although causality is questionable.

## 2024-05-17 NOTE — LETTER
[facility]  [address]  [place] [address]  Phone: [phone]  Fax: [phone]   June 21, 2024    Joseph Beltran, FNP-C  [address] [facility]  [place] [address]    Patient: Kate Lira   MR Number: [id number]   YOB: 1955   Date of Visit: 5/17/2024       Dear Dr. Beltran :    Thank you for referring Kate Lira to me for evaluation. Here is my assessment and plan of care:    Assessment/    If you have questions, please do not hesitate to call me. I look forward to following Ms. Kate Lira along with you.    Sincerely,        Noble Guillory MD       CC  No Recipients

## 2024-05-27 ENCOUNTER — OFFICE VISIT (OUTPATIENT)
Dept: CARDIOLOGY | Facility: CLINIC | Age: 69
End: 2024-05-27
Payer: MEDICARE

## 2024-05-27 VITALS
WEIGHT: 239.63 LBS | HEART RATE: 69 BPM | DIASTOLIC BLOOD PRESSURE: 72 MMHG | SYSTOLIC BLOOD PRESSURE: 128 MMHG | OXYGEN SATURATION: 99 % | BODY MASS INDEX: 36.44 KG/M2

## 2024-05-27 DIAGNOSIS — I95.9 HYPOTENSION, UNSPECIFIED HYPOTENSION TYPE: ICD-10-CM

## 2024-05-27 DIAGNOSIS — E78.5 HYPERLIPIDEMIA, UNSPECIFIED HYPERLIPIDEMIA TYPE: ICD-10-CM

## 2024-05-27 DIAGNOSIS — J45.909 ASTHMA, UNSPECIFIED ASTHMA SEVERITY, UNSPECIFIED WHETHER COMPLICATED, UNSPECIFIED WHETHER PERSISTENT: ICD-10-CM

## 2024-05-27 DIAGNOSIS — I25.10 CORONARY ARTERY DISEASE, OCCLUSIVE: ICD-10-CM

## 2024-05-27 DIAGNOSIS — Z95.5 S/P CORONARY ARTERY STENT PLACEMENT: ICD-10-CM

## 2024-05-27 DIAGNOSIS — R49.0 HOARSENESS OF VOICE: ICD-10-CM

## 2024-05-27 DIAGNOSIS — I67.2 CEREBRAL ATHEROSCLEROSIS: ICD-10-CM

## 2024-05-27 PROCEDURE — 1101F PT FALLS ASSESS-DOCD LE1/YR: CPT | Mod: CPTII,S$GLB,, | Performed by: INTERNAL MEDICINE

## 2024-05-27 PROCEDURE — 3008F BODY MASS INDEX DOCD: CPT | Mod: CPTII,S$GLB,, | Performed by: INTERNAL MEDICINE

## 2024-05-27 PROCEDURE — 99999 PR PBB SHADOW E&M-EST. PATIENT-LVL III: CPT | Mod: PBBFAC,,, | Performed by: INTERNAL MEDICINE

## 2024-05-27 PROCEDURE — 1126F AMNT PAIN NOTED NONE PRSNT: CPT | Mod: CPTII,S$GLB,, | Performed by: INTERNAL MEDICINE

## 2024-05-27 PROCEDURE — 3288F FALL RISK ASSESSMENT DOCD: CPT | Mod: CPTII,S$GLB,, | Performed by: INTERNAL MEDICINE

## 2024-05-27 PROCEDURE — 3074F SYST BP LT 130 MM HG: CPT | Mod: CPTII,S$GLB,, | Performed by: INTERNAL MEDICINE

## 2024-05-27 PROCEDURE — 3044F HG A1C LEVEL LT 7.0%: CPT | Mod: CPTII,S$GLB,, | Performed by: INTERNAL MEDICINE

## 2024-05-27 PROCEDURE — 99214 OFFICE O/P EST MOD 30 MIN: CPT | Mod: S$GLB,,, | Performed by: INTERNAL MEDICINE

## 2024-05-27 PROCEDURE — 3078F DIAST BP <80 MM HG: CPT | Mod: CPTII,S$GLB,, | Performed by: INTERNAL MEDICINE

## 2024-05-27 PROCEDURE — 1159F MED LIST DOCD IN RCRD: CPT | Mod: CPTII,S$GLB,, | Performed by: INTERNAL MEDICINE

## 2024-05-27 RX ORDER — ISOSORBIDE MONONITRATE 30 MG/1
30 TABLET, EXTENDED RELEASE ORAL DAILY
COMMUNITY

## 2024-05-27 NOTE — PROGRESS NOTES
Subjective:   Patient ID:  Kate Lira is a 68 y.o. female who presents for evaluation of No chief complaint on file.        HPI  5.27.2024  Comes in for a 6 months follow up   Going for an eye procedure this week   Denies any chest pain, we stopped imdur last visit given episodes of hypotension however PCP continued and she is doing fine  States she lowered the diamox she takes for glaucoma  Denies chest pain   No slurred speech , focal weakness or numbness, amaurosis, dizziness       11.27.2023  Comes in for six-month follow-up.    She states that she has chest pain when she moves her arms up.  She is tender also on exam and pain was reproducible with arm movement.    She had a nonobstructive catheterization a year ago.    Her pain does not seem to be angina  She also states that her pressure has been dropping low.  Her primary care physician lowered her isosorbide to 30 and stopped her HCTZ however she states that she still gets episodes of hypotension in the 80s.    She is also taking multiple medications for her glaucoma.  Including acetazolamide and eyedrops.    However no syncope.  No dyspnea on exertion.    No lower extremity swelling.        4.3.2023  Comes in for a 6months follow up  Started diamox as she states for her eye condition   Since then seeing low BP   Her PCP lower toprol to 12.5 mg daily   Still notices low bp at times and feels weak with it sometimes   No syncope   No other cardiac complaints     10.30.2022  Comes in for follow up after cath   History of ostial LAD stent.  With normal nuclear stress however abnormal transient ischemic dilation.    Her coronary angiogram showed minimal to mild ISR of her LAD stent.  With mild disease in the LAD otherwise no other significant finding.    She presented the same day again to the hospital with headache.  A CT of the head showed right more than left-sided microvascular changes.    Otherwise no other findings.    She denies any chest pain.   "No dyspnea on exertion.  Her groin access site is good.  No other complaints today.      7.25.2022  65 yo female, ostial LAD stent in 2015 with Dr Bateman, no other residual lesions   Had a normal nuclear stress in 2019   Review of her last office visit, mention of sticking underneath the left breast chest pain followed by normal nuc in 2019   Today still complains of moderate chest pain, described as pressure and sharp, mostly for the last week, sometimes " sticks me" , but can be worse with exertion , radiates toward her left shoulder, it was mildy reproducible with lifting arm against resistance  She also complains of THAKKAR NYHA 1 ,  no leg swelling or orthopnea  Denies snoring , or daytime sleepiness   BP mildly elevated today   She does also have asthma but denies any attacks or wheezing     Past Medical History:   Diagnosis Date    Arthritis     Asthma     GERD (gastroesophageal reflux disease)     Glaucoma     Hypertension        Past Surgical History:   Procedure Laterality Date    CATHETERIZATION OF BOTH LEFT AND RIGHT HEART N/A 9/23/2022    Procedure: CATHETERIZATION, HEART, BOTH LEFT AND RIGHT;  Surgeon: Renee Colon MD;  Location: [facility] CATH LAB;  Service: Cardiology;  Laterality: N/A;    CHOLECYSTECTOMY      CORONARY ANGIOGRAPHY N/A 9/23/2022    Procedure: ANGIOGRAM, CORONARY ARTERY;  Surgeon: Renee Colon MD;  Location: [facility] CATH LAB;  Service: Cardiology;  Laterality: N/A;    CORONARY ANGIOPLASTY  02/2015    INJECTION OF ANESTHETIC AGENT AROUND MEDIAL BRANCH NERVES INNERVATING LUMBAR FACET JOINT Bilateral 5/25/2020    Procedure: Bilateral L3-5 MBB with local;  Surgeon: Roger Bhandari MD;  Location: [facility];  Service: Pain Management;  Laterality: Bilateral;    KNEE SURGERY      left    TUBAL LIGATION         Social History     Tobacco Use    Smoking status: Never    Smokeless tobacco: Never   Substance Use Topics    Alcohol use: No    Drug use: No       Family History   Problem " Relation Name Age of Onset    Hypertension Mother      Heart disease Mother      Heart attack Mother  64        MI    Diabetes Father      Osteoarthritis Sister      Heart disease Sister      Cancer Sister          1  sister had cervical ca    Osteoarthritis Brother      Heart disease Brother         Review of Systems   Cardiovascular:  Negative for chest pain, dyspnea on exertion, palpitations and syncope.   Genitourinary: Negative.    Neurological: Negative.        Current Outpatient Medications on File Prior to Visit   Medication Sig    acetaminophen (TYLENOL) 650 MG TbSR Take 650 mg by mouth every 8 (eight) hours.    acetaZOLAMIDE (DIAMOX) 250 MG tablet Take 1 tablet (250 mg total) by mouth 4 (four) times daily.    albuterol (ACCUNEB) 1.25 mg/3 mL Nebu Inhale 1.25 mg into the lungs every 6 (six) hours as needed.    albuterol (PROVENTIL HFA) 90 mcg/actuation inhaler Inhale 2 puffs into the lungs every 4 (four) hours as needed for Wheezing. 2 puffs two times daily    aspirin (ECOTRIN) 81 MG EC tablet Take 1 tablet (81 mg total) by mouth once daily.    aspirin (ECOTRIN) 81 MG EC tablet Take 1 tablet by mouth every morning.    atorvastatin (LIPITOR) 20 MG tablet Take 1 tablet (20 mg total) by mouth every evening.    butalbital-acetaminophen-caffeine -40 mg (FIORICET, ESGIC) -40 mg per tablet Take 1 tablet by mouth every 4 (four) hours as needed for Pain or Headaches.    carboxymethylcell/hypromellose (GENTEAL GEL OPHT) Apply to eye.    diclofenac sodium (VOLTAREN) 1 % Gel Apply topically.    dorzolamide (TRUSOPT) 2 % ophthalmic solution INSTILL 1 DROP INTO BOTH EYES TWICE DAILY    empagliflozin (JARDIANCE) 10 mg tablet Take 1 tablet by mouth every morning.    fluticasone furoate-vilanterol (BREO) 100-25 mcg/dose diskus inhaler Inhale 1 puff into the lungs.    hydrOXYzine (VISTARIL) 100 MG capsule Take 1 capsule by mouth 2 (two) times daily as needed.    latanoprost 0.005 % ophthalmic solution  PLACE 1 DROP INTO BOTH EYES EVERY EVENING.    metoprolol succinate (TOPROL-XL) 25 MG 24 hr tablet Take 12.5 mg by mouth.    midodrine (PROAMATINE) 2.5 MG Tab Take 1 tablet (2.5 mg total) by mouth daily as needed (BP below 90).    ondansetron (ZOFRAN-ODT) 4 MG TbDL Take 1 tablet (4 mg total) by mouth every 6 (six) hours as needed.    oxybutynin (DITROPAN XL) 15 MG TR24 Take 5 mg by mouth once daily.    oxybutynin (DITROPAN-XL) 10 MG 24 hr tablet Take 10 mg by mouth every morning.    pantoprazole (PROTONIX) 40 MG tablet Take 1 tablet by mouth every morning.    SYMBICORT 80-4.5 mcg/actuation HFAA Inhale 2 puffs into the lungs 2 (two) times daily.    tamsulosin (FLOMAX) 0.4 mg Cap Take 1 capsule by mouth once daily.    traMADoL (ULTRAM) 50 mg tablet Take 50 mg by mouth 3 (three) times daily as needed.    TRELEGY ELLIPTA 100-62.5-25 mcg DsDv Inhale 1 puff into the lungs.    vitamin D (VITAMIN D3) 1000 units Tab Take by mouth.    betaxolol 0.5% (BETOPTIC-S) 0.5 % Drop Place 1 drop into both eyes 2 (two) times daily.    brimonidine 0.2% (ALPHAGAN) 0.2 % Drop Place 1 drop into both eyes 3 (three) times daily.    isosorbide mononitrate (IMDUR) 30 MG 24 hr tablet Take 30 mg by mouth once daily.    nitroGLYCERIN (NITROSTAT) 0.4 MG SL tablet Place 1 tablet (0.4 mg total) under the tongue every 5 (five) minutes as needed for Chest pain.     No current facility-administered medications on file prior to visit.       Objective:   Objective:  Wt Readings from Last 3 Encounters:   05/27/24 108.7 kg (239 lb 10.2 oz)   11/27/23 109.8 kg (242 lb 1 oz)   04/03/23 104.1 kg (229 lb 8 oz)     Temp Readings from Last 3 Encounters:   10/25/22 99.7 °F (37.6 °C) (Oral)   09/23/22 98.3 °F (36.8 °C) (Oral)   09/23/22 97.8 °F (36.6 °C) (Temporal)     BP Readings from Last 3 Encounters:   05/27/24 128/72   11/27/23 126/82   04/03/23 100/60     Pulse Readings from Last 3 Encounters:   05/27/24 69   11/27/23 62   04/03/23 64       Physical  Exam  Vitals reviewed.   Constitutional:       Appearance: She is well-developed.   Neck:      Vascular: No carotid bruit.   Cardiovascular:      Rate and Rhythm: Normal rate and regular rhythm.      Pulses: Intact distal pulses.      Heart sounds: Normal heart sounds. No murmur heard.  Pulmonary:      Breath sounds: Normal breath sounds.   Neurological:      Mental Status: She is oriented to person, place, and time.         Lab Results   Component Value Date    CHOL 133 10/03/2023    CHOL 131 09/30/2022    CHOL 138 09/27/2021     Lab Results   Component Value Date    HDL 51 10/03/2023    HDL 43 09/30/2022    HDL 42 09/27/2021     Lab Results   Component Value Date    LDLCALC 71 10/03/2023    LDLCALC 75 09/30/2022    LDLCALC 84 09/27/2021     Lab Results   Component Value Date    TRIG 48 10/03/2023    TRIG 60 09/30/2022    TRIG 54 09/27/2021     Lab Results   Component Value Date    CHOLHDL 31.3 02/07/2017    CHOLHDL 30.6 07/29/2016    CHOLHDL 30.6 04/20/2015       Chemistry        Component Value Date/Time     10/25/2022 0638    K 3.5 10/25/2022 0638     10/25/2022 0638    CO2 27 10/25/2022 0638    BUN 20 10/25/2022 0638    CREATININE 1.4 10/25/2022 0638     (H) 10/25/2022 0638        Component Value Date/Time    CALCIUM 9.5 10/25/2022 0638    ALKPHOS 55 10/25/2022 0638    AST 23 10/25/2022 0638    ALT 26 10/25/2022 0638    BILITOT 0.4 10/25/2022 0638    ESTGFRAFRICA 39 (A) 07/18/2020 1425    EGFRNONAA 34 (A) 07/18/2020 1425          Lab Results   Component Value Date    TSH 1.640 10/03/2023     Lab Results   Component Value Date    INR 1.0 09/08/2022    INR 1.1 12/07/2016    INR 1.0 01/28/2015     Lab Results   Component Value Date    WBC 5.0 10/03/2023    HGB 13.3 10/03/2023    HCT 42.8 10/03/2023    MCV 96 10/25/2022     (L) 10/03/2023     BNP  @LABRCNTIP(BNP,BNPTRIAGEBLO)@  CrCl cannot be calculated (Patient's most recent lab result is older than the maximum 7 days allowed.).      Imaging:  ======  No results found for this or any previous visit.    No results found for this or any previous visit.    No results found for this or any previous visit.    Results for orders placed during the hospital encounter of 12/07/16    X-Ray Chest PA And Lateral    Narrative  EXAM: Chest X-ray PA and Lateral    CLINICAL HISTORY:     Chest pain, unspecified    COMPARISON STUDIES:     01/28/2015    FINDINGS:  Cardiomegaly.  Tortuous descending thoracic aorta.  Lungs appear clear.  Thoracic scoliosis convex to the right.. Ribs appear intact.  IMPRESSION:  No acute findings.  Please see above      Electronically signed by: WILLI MARTINEZ MD  Date:     12/07/16  Time:    08:02    No results found for this or any previous visit.    No valid procedures specified.    Diagnostic Results:  ECG: Reviewed  Nsr., lvh      Results for orders placed during the hospital encounter of 09/23/22    Cardiac catheterization    Conclusion    The pre-procedure left ventricular end diastolic pressure was 8.    The estimated blood loss was none.    There was non-obstructive coronary artery disease..    The filling pressures on the right and left were normal.    Patent ostial LAD stent with minimal ISR    The procedure log was documented by Documenter: Sirena Ugalde RN and verified by Renee Colon MD.    Date: 9/23/2022  Time: 10:40 AM    The 10-year ASCVD risk score (Chela YOUSSEF, et al., 2019) is: 7.9%    Values used to calculate the score:      Age: 68 years      Sex: Female      Is Non- : Yes      Diabetic: No      Tobacco smoker: No      Systolic Blood Pressure: 128 mmHg      Is BP treated: Yes      HDL Cholesterol: 51 mg/dL      Total Cholesterol: 133 mg/dL    Assessment and Plan:   BMI 36.0-36.9,adult    S/P coronary artery stent placement    Hyperlipidemia, unspecified hyperlipidemia type    Asthma, unspecified asthma severity, unspecified whether complicated, unspecified whether  persistent    Coronary artery disease, occlusive    Hypotension, unspecified hypotension type    Cerebral atherosclerosis    Hoarseness of voice  -     Ambulatory referral/consult to ENT; Future; Expected date: 06/03/2024      cw asa, lipitor and toprol ,  Has midodrine p.r.n.  Still taking isosorbide  Ok to undergo her lasik procedure , on aspirin  Noncardiac chest pain.  Mild Nonobstructive CAD on catheterization 2022  Reviewed all tests and above medical conditions with patient in detail and formulated treatment plan.  Risk factor modification discussed.   Cardiac low salt diet discussed.  Maintaining healthy weight and weight loss goals were discussed in clinic.  Requested ENT appointment for voice hoarsness   Follow up in 6 months

## 2024-05-30 ENCOUNTER — OUTSIDE PLACE OF SERVICE (OUTPATIENT)
Dept: OPHTHALMOLOGY | Facility: CLINIC | Age: 69
End: 2024-05-30
Payer: MEDICARE

## 2024-05-30 PROCEDURE — 65855 TRABECULOPLASTY LASER SURG: CPT | Mod: RT,,, | Performed by: OPHTHALMOLOGY

## 2024-06-07 ENCOUNTER — OFFICE VISIT (OUTPATIENT)
Dept: OTOLARYNGOLOGY | Facility: CLINIC | Age: 69
End: 2024-06-07
Payer: MEDICARE

## 2024-06-07 DIAGNOSIS — K21.9 LARYNGOPHARYNGEAL REFLUX (LPR): Primary | ICD-10-CM

## 2024-06-07 DIAGNOSIS — J38.7 PRESBYLARYNX: ICD-10-CM

## 2024-06-07 PROCEDURE — 31575 DIAGNOSTIC LARYNGOSCOPY: CPT | Mod: S$GLB,,, | Performed by: STUDENT IN AN ORGANIZED HEALTH CARE EDUCATION/TRAINING PROGRAM

## 2024-06-07 PROCEDURE — 3044F HG A1C LEVEL LT 7.0%: CPT | Mod: CPTII,S$GLB,, | Performed by: STUDENT IN AN ORGANIZED HEALTH CARE EDUCATION/TRAINING PROGRAM

## 2024-06-07 PROCEDURE — 99204 OFFICE O/P NEW MOD 45 MIN: CPT | Mod: 25,S$GLB,, | Performed by: STUDENT IN AN ORGANIZED HEALTH CARE EDUCATION/TRAINING PROGRAM

## 2024-06-07 PROCEDURE — 99999 PR PBB SHADOW E&M-EST. PATIENT-LVL I: CPT | Mod: PBBFAC,,, | Performed by: STUDENT IN AN ORGANIZED HEALTH CARE EDUCATION/TRAINING PROGRAM

## 2024-06-07 RX ORDER — PANTOPRAZOLE SODIUM 40 MG/1
40 TABLET, DELAYED RELEASE ORAL 2 TIMES DAILY
Qty: 120 TABLET | Refills: 0 | Status: SHIPPED | OUTPATIENT
Start: 2024-06-07 | End: 2024-08-06

## 2024-06-07 NOTE — PROGRESS NOTES
Chief complaint:    Chief Complaint   Patient presents with    Sore Throat     Pt has been hoarse for off and on x 1 month   Pt able to eat and drink ok   Pt has hx of reflux and is currently taking rx for this            Referring Provider:  Renee Colon Md  [address]  [place] [address]    History of present illness:     Ms. Lira is a 68 y.o. presenting for evaluation of hoarseness.     Onset: 1+ month, progressively worsening   Severity: moderate  Quality: raspy  The voice has been intermittently hoarse.   Associated signs and symptoms: sore throat, worse after eating certain foods, throat clearing, mucus sensation in throat  Symptoms are worse after eating or talking for a while  Heartburn: Yes - on PPI (takes it once very morning before eating)  Smoking: No  Prior medical therapy:    Proton Pump Inhibitor or H2 Blocker    History      Past Medical History:   Past Medical History:   Diagnosis Date    Arthritis     Asthma     GERD (gastroesophageal reflux disease)     Glaucoma     Hypertension          Past Surgical History:  Past Surgical History:   Procedure Laterality Date    CATHETERIZATION OF BOTH LEFT AND RIGHT HEART N/A 9/23/2022    Procedure: CATHETERIZATION, HEART, BOTH LEFT AND RIGHT;  Surgeon: Renee Colon MD;  Location: [facility] CATH LAB;  Service: Cardiology;  Laterality: N/A;    CHOLECYSTECTOMY      CORONARY ANGIOGRAPHY N/A 9/23/2022    Procedure: ANGIOGRAM, CORONARY ARTERY;  Surgeon: Renee Colon MD;  Location: [facility] CATH LAB;  Service: Cardiology;  Laterality: N/A;    CORONARY ANGIOPLASTY  02/2015    INJECTION OF ANESTHETIC AGENT AROUND MEDIAL BRANCH NERVES INNERVATING LUMBAR FACET JOINT Bilateral 5/25/2020    Procedure: Bilateral L3-5 MBB with local;  Surgeon: Roger Bhandari MD;  Location: [facility] PAIN MGT;  Service: Pain Management;  Laterality: Bilateral;    KNEE SURGERY      left    TUBAL LIGATION           Medications: Medication list reviewed. She  has a current  medication list which includes the following prescription(s): acetaminophen, acetazolamide, albuterol, albuterol, aspirin, aspirin, atorvastatin, betaxolol 0.5%, brimonidine 0.2%, butalbital-acetaminophen-caffeine -40 mg, carboxymethylcell/hypromellose, diclofenac sodium, dorzolamide, empagliflozin, fluticasone furoate-vilanterol, hydroxyzine, isosorbide mononitrate, latanoprost, metoprolol succinate, midodrine, nitroglycerin, ondansetron, oxybutynin, oxybutynin, pantoprazole, symbicort, tamsulosin, tramadol, trelegy ellipta, and vitamin d.     Allergies:   Review of patient's allergies indicates:   Allergen Reactions    Ace inhibitors Anaphylaxis     (Throat swelling)         Family history: family history includes Cancer in her sister; Diabetes in her father; Heart attack (age of onset: 64) in her mother; Heart disease in her brother, mother, and sister; Hypertension in her mother; Osteoarthritis in her brother and sister.         Social History          Alcohol use:  reports no history of alcohol use.            Tobacco:  reports that she has never smoked. She has never used smokeless tobacco.         Physical Examination      Vitals: There were no vitals taken for this visit.      General: Well developed, well nourished, well hydrated.    Voice: mild dysphonia, mildly raspy, no dysarthria      Head/Face: Normocephalic, atraumatic. No scars or lesions. Facial musculature equal.     Eyes: No scleral icterus or conjunctival hemorrhage. EOMI. PERRLA.     Ears:     Right ear: No gross deformity. EAC is clear of debris and erythema. TM are intact with a pneumatized middle ear. No signs of retraction, fluid or infection.      Left ear: No gross deformity. EAC is clear of debris and erythema. TM are intact with a pneumatized middle ear. No signs of retraction, fluid or infection.      Nose: No gross deformity or lesions. No purulent discharge. No significant NSD.      Mouth/Oropharynx: Lips without any lesions. No  mucosal lesions within the oropharynx. No tonsillar exudate or lesions. Pharyngeal walls symmetrical. Uvula midline. Tongue midline without lesions.     Neck: Trachea midline. No masses. No thyromegaly or nodules palpated.     Lymphatic: No lymphadenopathy in the neck.     Extremities: No cyanosis. Warm and well-perfused.     Skin: No scars or lesions on face or neck.      Neurologic: Moving all extremities without gross abnormality.CN II-XII grossly intact. House-Brackmann 1/6. No signs of nystagmus.          Data reviewed      Review of records:      I reviewed records from the referring provider's office visits, including the history, workup, and/or treatment of this problem thus far.    Procedures:    Procedure -Transnasal fiberoptic laryngoscopy     Surgeon: Cristi Hoskins M.D. .      Anesthesia: topical 0.05% oxymetazoline with 4% lidocaine      Complications: None.     Description of Procedure: With the patient in the sitting position, topical lidocaine and oxymetazoline was applied to the nose. The scope was passed through the nose. Examination was carried out of the nose, nasopharynx, oropharynx, hypopharynx, and larynx with findings as noted above. Scope was removed. The patient tolerated the procedure well.      Findings: No masses or lesions in the nose, nasopharynx, oropharynx, hypopharynx, or larynx. Vocal fold abduction and adduction is normal with 1-2 mm glottic gap with hourglass shape. No pooling of secretions in the piriform sinuses, penetration, or aspiration.          Assessment/Plan:    1. Laryngopharyngeal reflux (LPR)    2. Presbylarynx        - diet and life style measures for Laryngopharyngeal Reflux, add second dose PPI for 2 months, add alginate therpay  - if insufficient improvement in 2 months then would consider VT as well for presbylarynx component              Cristi Hoskins MD  [facility]   [facility]  [address]  [place] [address]  P: [phone]  F: [phone]

## 2024-06-21 ENCOUNTER — OFFICE VISIT (OUTPATIENT)
Dept: OPHTHALMOLOGY | Facility: CLINIC | Age: 69
End: 2024-06-21
Payer: MEDICARE

## 2024-06-21 DIAGNOSIS — H25.13 NUCLEAR SCLEROSIS, BILATERAL: ICD-10-CM

## 2024-06-21 DIAGNOSIS — H40.1111 PRIMARY OPEN ANGLE GLAUCOMA (POAG) OF RIGHT EYE, MILD STAGE: Primary | ICD-10-CM

## 2024-06-21 DIAGNOSIS — H40.1123 PRIMARY OPEN ANGLE GLAUCOMA (POAG) OF LEFT EYE, SEVERE STAGE: ICD-10-CM

## 2024-06-21 DIAGNOSIS — H04.129 DRY EYE: ICD-10-CM

## 2024-06-21 PROCEDURE — 99999 PR PBB SHADOW E&M-EST. PATIENT-LVL III: CPT | Mod: PBBFAC,,, | Performed by: OPHTHALMOLOGY

## 2024-06-21 NOTE — PROGRESS NOTES
"HPI     Glaucoma            Comments: Pt reports for 3wk check SLT OD 5/30/24. Denies any pain or   irritation. Va stable. 100% compliant with gtts.           Comments    Primary open angle glaucoma (POAG) of left eye, severe stage   Primary open angle glaucoma (POAG) of right eye, mild stage   Nuclear sclerosis, bilateral   Myopia with astigmatism and presbyopia, bilateral   SLT OS 7/26/22    /472     GCL: 26/20 10/2021  Gcl: 25/19 10/19/2022    + ASTHMA     Latanoprost more effective than Lumigan  **DMX causing low BP per cardiologist**  Rocklatan "caused twitching"      *FLOMAX*   + ASA     DMX tablets PO   Latanoprost qhs OU  Dorzolamide BID OU   Brimonidine OU TID   Betaxalol BID OU  Genteal Gel QHS OU             Last edited by Noble Krishnan on 6/21/2024  8:27 AM.            Assessment /Plan     For exam results, see Encounter Report.      ICD-10-CM ICD-9-CM    1. Primary open angle glaucoma (POAG) of right eye, mild stage  H40.1111 365.11 Good response to SLT OD      365.71       2. Primary open angle glaucoma (POAG) of left eye, severe stage  H40.1123 365.11 Increased IOP OS today , pt will increase DMX dosing and if IOP not lower may need trab left eye in the future      365.73       3. Dry eye  H04.129 375.15 Continue tears and resume gel       4. Nuclear sclerosis, bilateral  H25.13 366.16 Follow at this time           RETURN TO CLINIC 2-3 weeks IOP     DMX tablets PO   Latanoprost qhs OU  Dorzolamide BID OU   Brimonidine OU TID   Betaxalol BID OU  Genteal Gel QHS OU             "

## 2024-07-12 ENCOUNTER — OFFICE VISIT (OUTPATIENT)
Dept: OPHTHALMOLOGY | Facility: CLINIC | Age: 69
End: 2024-07-12
Payer: MEDICARE

## 2024-07-12 DIAGNOSIS — H25.13 NUCLEAR SCLEROSIS, BILATERAL: ICD-10-CM

## 2024-07-12 DIAGNOSIS — H04.129 DRY EYE: ICD-10-CM

## 2024-07-12 DIAGNOSIS — H40.1133 PRIMARY OPEN ANGLE GLAUCOMA (POAG) OF BOTH EYES, SEVERE STAGE: Primary | ICD-10-CM

## 2024-07-12 PROBLEM — H40.1111 PRIMARY OPEN ANGLE GLAUCOMA (POAG) OF RIGHT EYE, MILD STAGE: Status: ACTIVE | Noted: 2024-07-12

## 2024-07-12 PROCEDURE — 99999 PR PBB SHADOW E&M-EST. PATIENT-LVL III: CPT | Mod: PBBFAC,,, | Performed by: OPHTHALMOLOGY

## 2024-07-12 NOTE — PROGRESS NOTES
"HPI     Glaucoma            Comments: Pt reports for 2-3wks IOP , no pain or irritations. Pt states   compliance with DMX tablets PO   Latanoprost qhs OU  Dorzolamide BID OU   Brimonidine OU TID   Betaxalol BID OU  Genteal Gel QHS OU                 Comments    1. Primary open angle glaucoma (POAG) of left eye, severe stage   Primary open angle glaucoma (POAG) of right eye, mild stage   SLT OS 7/26/22  SLT OD 5/30/24  /472   GCL: 26/20 --10/2021  GCL 23/17--05/17/24  2. Nuclear sclerosis, bilateral       Latanoprost more effective than Lumigan  **DMX causing low BP per cardiologist**  Rocklatan "caused twitching"      *FLOMAX*   + ASA       DMX tablets PO ( 2 1/2 pills and 1 whole pill)   Latanoprost qhs OU  Dorzolamide BID OU   Brimonidine OU TID   Betaxalol BID OU  Genteal Gel QHS OU             Last edited by Macy Keenan MA on 7/12/2024  9:17 AM.            Assessment /Plan     For exam results, see Encounter Report.      ICD-10-CM ICD-9-CM    1. Primary open angle glaucoma (POAG) of both eyes, severe stage  H40.1133 365.11 Iop improved today on meds and Diamox   Visit today included increased complexity associated with the care and management of glaucoma , including medical , laser, and possible surgical care. Written instructions were given to the patient upon closure of the encounter regarding specific use of the required medications.        365.73       2. Dry eye  H04.129 375.15 Follow       3. Nuclear sclerosis, bilateral  H25.13 366.16 Follow at this time             DMX tablets PO 1 whole am, 1/2 midday and 1 whole at pm)   Latanoprost qhs OU  Dorzolamide BID OU   Brimonidine OU TID   Betaxalol BID OU  Genteal Gel QHS OU         Return to clinic 4 weeks IOP       "

## 2024-07-15 DIAGNOSIS — H40.1123 PRIMARY OPEN ANGLE GLAUCOMA (POAG) OF LEFT EYE, SEVERE STAGE: ICD-10-CM

## 2024-07-15 DIAGNOSIS — H40.1111 PRIMARY OPEN ANGLE GLAUCOMA (POAG) OF RIGHT EYE, MILD STAGE: ICD-10-CM

## 2024-07-15 RX ORDER — BRIMONIDINE TARTRATE 2 MG/ML
1 SOLUTION/ DROPS OPHTHALMIC 3 TIMES DAILY
Qty: 25 ML | Refills: 3 | Status: SHIPPED | OUTPATIENT
Start: 2024-07-15 | End: 2024-10-13

## 2024-07-17 DIAGNOSIS — H40.1111 PRIMARY OPEN ANGLE GLAUCOMA (POAG) OF RIGHT EYE, MILD STAGE: ICD-10-CM

## 2024-07-17 DIAGNOSIS — H40.1123 PRIMARY OPEN ANGLE GLAUCOMA (POAG) OF LEFT EYE, SEVERE STAGE: ICD-10-CM

## 2024-07-17 RX ORDER — ACETAZOLAMIDE 250 MG/1
TABLET ORAL
Qty: 120 TABLET | Refills: 6 | Status: SHIPPED | OUTPATIENT
Start: 2024-07-17

## 2024-08-08 ENCOUNTER — OFFICE VISIT (OUTPATIENT)
Dept: OTOLARYNGOLOGY | Facility: CLINIC | Age: 69
End: 2024-08-08
Payer: MEDICARE

## 2024-08-08 DIAGNOSIS — J38.7 PRESBYLARYNX: Primary | ICD-10-CM

## 2024-08-08 DIAGNOSIS — K21.9 LARYNGOPHARYNGEAL REFLUX (LPR): ICD-10-CM

## 2024-08-08 DIAGNOSIS — J00 ACUTE RHINITIS: ICD-10-CM

## 2024-08-08 PROCEDURE — 99213 OFFICE O/P EST LOW 20 MIN: CPT | Mod: S$GLB,,, | Performed by: STUDENT IN AN ORGANIZED HEALTH CARE EDUCATION/TRAINING PROGRAM

## 2024-08-08 PROCEDURE — 3044F HG A1C LEVEL LT 7.0%: CPT | Mod: CPTII,S$GLB,, | Performed by: STUDENT IN AN ORGANIZED HEALTH CARE EDUCATION/TRAINING PROGRAM

## 2024-08-08 PROCEDURE — 3288F FALL RISK ASSESSMENT DOCD: CPT | Mod: CPTII,S$GLB,, | Performed by: STUDENT IN AN ORGANIZED HEALTH CARE EDUCATION/TRAINING PROGRAM

## 2024-08-08 PROCEDURE — 1159F MED LIST DOCD IN RCRD: CPT | Mod: CPTII,S$GLB,, | Performed by: STUDENT IN AN ORGANIZED HEALTH CARE EDUCATION/TRAINING PROGRAM

## 2024-08-08 PROCEDURE — 1101F PT FALLS ASSESS-DOCD LE1/YR: CPT | Mod: CPTII,S$GLB,, | Performed by: STUDENT IN AN ORGANIZED HEALTH CARE EDUCATION/TRAINING PROGRAM

## 2024-08-08 PROCEDURE — 99999 PR PBB SHADOW E&M-EST. PATIENT-LVL III: CPT | Mod: PBBFAC,,, | Performed by: STUDENT IN AN ORGANIZED HEALTH CARE EDUCATION/TRAINING PROGRAM

## 2024-08-09 DIAGNOSIS — K21.9 LARYNGOPHARYNGEAL REFLUX (LPR): Primary | ICD-10-CM

## 2024-08-09 RX ORDER — PANTOPRAZOLE SODIUM 40 MG/1
40 TABLET, DELAYED RELEASE ORAL 2 TIMES DAILY
Qty: 120 TABLET | Refills: 0 | Status: SHIPPED | OUTPATIENT
Start: 2024-08-09

## 2024-08-16 ENCOUNTER — OFFICE VISIT (OUTPATIENT)
Dept: OPHTHALMOLOGY | Facility: CLINIC | Age: 69
End: 2024-08-16
Payer: MEDICARE

## 2024-08-16 DIAGNOSIS — H25.13 NUCLEAR SCLEROSIS, BILATERAL: ICD-10-CM

## 2024-08-16 DIAGNOSIS — H40.1111 PRIMARY OPEN ANGLE GLAUCOMA (POAG) OF RIGHT EYE, MILD STAGE: ICD-10-CM

## 2024-08-16 DIAGNOSIS — H04.129 DRY EYE: ICD-10-CM

## 2024-08-16 DIAGNOSIS — H40.1123 PRIMARY OPEN ANGLE GLAUCOMA (POAG) OF LEFT EYE, SEVERE STAGE: Primary | ICD-10-CM

## 2024-08-16 PROCEDURE — 99999 PR PBB SHADOW E&M-EST. PATIENT-LVL II: CPT | Mod: PBBFAC,,, | Performed by: OPHTHALMOLOGY

## 2024-08-16 RX ORDER — KETOROLAC TROMETHAMINE 5 MG/ML
1 SOLUTION OPHTHALMIC 4 TIMES DAILY
Qty: 5 ML | Refills: 1 | Status: SHIPPED | OUTPATIENT
Start: 2024-08-16 | End: 2024-08-21

## 2024-08-16 NOTE — PROGRESS NOTES
"HPI     Glaucoma            Comments: Pt reports for 1m IOP check. Denies any pain or discomfort. Va   stable. 100% compliant with gtts.             Comments    1. Primary open angle glaucoma (POAG) of left eye, severe stage   Primary open angle glaucoma (POAG) of right eye, mild stage   SLT OS 7/26/22  SLT OD 5/30/24  /472   GCL: 26/20 --10/2021  GCL 23/17--05/17/24  2. Nuclear sclerosis, bilateral       Latanoprost more effective than Lumigan  **DMX causing low BP per cardiologist**  Rocklatan "caused twitching"      *FLOMAX*   + ASA       DMX tablets PO 1 whole am, 1/2 midday and 1 whole at pm)   Latanoprost qhs OU  Dorzolamide BID OU   Brimonidine OU TID   Betaxalol BID OU  Genteal Gel QHS OU             Last edited by Noble Krishnan on 8/16/2024  9:33 AM.            Assessment /Plan     For exam results, see Encounter Report.      ICD-10-CM ICD-9-CM    1. Primary open angle glaucoma (POAG) of left eye, severe stage  H40.1123 365.11 IOP not within acceptable range relative to target IOP with risk of irreversible visual loss. Additional treatment required.  Discussed options, risks, and benefits of additional medication, SLT laser, or incisional glaucoma surgery.   Pt defers incisional surgery     Recommend: SLT OS use Keto after laser   Pt defers Ilevro/Prolensa      Patient chooses the above     Reviewed importance of continued compliance with treatment and follow up.     Visit today included increased complexity associated with the care and management of glaucoma, including medical , laser, and possible surgical care. Written instructions were given to the patient upon closure of the encounter regarding specific use of the required medications.          365.73       2. Primary open angle glaucoma (POAG) of right eye, mild stage  H40.1111 365.11 On max meds at this time, follow      365.71       3. Dry eye  H04.129 375.15 Continue tears       4. Nuclear sclerosis, bilateral  H25.13 366.16 Follow     "           DMX tablets PO 1 whole am, 1/2 midday and 1 whole at pm)   Latanoprost qhs OU  Dorzolamide BID OU   Brimonidine OU TID   Betaxalol BID OU  Genteal Gel QHS OU

## 2024-09-21 DIAGNOSIS — H40.1123 PRIMARY OPEN ANGLE GLAUCOMA (POAG) OF LEFT EYE, SEVERE STAGE: ICD-10-CM

## 2024-09-21 DIAGNOSIS — H40.1111 PRIMARY OPEN ANGLE GLAUCOMA (POAG) OF RIGHT EYE, MILD STAGE: ICD-10-CM

## 2024-09-23 RX ORDER — LATANOPROST 50 UG/ML
1 SOLUTION/ DROPS OPHTHALMIC
Qty: 7.5 ML | Refills: 3 | Status: SHIPPED | OUTPATIENT
Start: 2024-09-23

## 2024-09-23 RX ORDER — DORZOLAMIDE HCL 20 MG/ML
1 SOLUTION/ DROPS OPHTHALMIC 2 TIMES DAILY
Qty: 20 ML | Refills: 3 | Status: SHIPPED | OUTPATIENT
Start: 2024-09-23

## 2024-09-26 ENCOUNTER — OUTSIDE PLACE OF SERVICE (OUTPATIENT)
Dept: OPHTHALMOLOGY | Facility: CLINIC | Age: 69
End: 2024-09-26
Payer: MEDICARE

## 2024-09-27 ENCOUNTER — OFFICE VISIT (OUTPATIENT)
Dept: OPHTHALMOLOGY | Facility: CLINIC | Age: 69
End: 2024-09-27
Payer: MEDICARE

## 2024-09-27 DIAGNOSIS — Z98.890 POST-OPERATIVE STATE: Primary | ICD-10-CM

## 2024-09-27 DIAGNOSIS — H40.1123 PRIMARY OPEN ANGLE GLAUCOMA (POAG) OF LEFT EYE, SEVERE STAGE: ICD-10-CM

## 2024-09-27 PROCEDURE — 99999 PR PBB SHADOW E&M-EST. PATIENT-LVL I: CPT | Mod: PBBFAC,,, | Performed by: OPHTHALMOLOGY

## 2024-09-27 NOTE — PROGRESS NOTES
"HPI     Post-op Evaluation            Comments: 1 day SLT OS  0/10 pain scale           Comments    1. (POAG) left eye, severe stage   (POAG) right eye, mild stage   SLT OS 9/26/24  SLT OS 7/26/22  SLT OD 5/30/24  /472   GCL: 26/20 --10/2021  GCL 23/17--05/17/24  2. Nuclear sclerosis, bilateral       Latanoprost more effective than Lumigan  **DMX causing low BP per cardiologist**  Rocklatan "caused twitching"      *FLOMAX*   + ASA       DMX tablets by mouth- 1 whole am, 1/2 midday and 1 whole at pm)   Both eyes- Latanoprost 1x daily   Both eyes- Dorzolamide 2xs daily  Both eyes- Brimonidine 3xs daily  Both eyes- Betaxalol 2xs daily            Last edited by Noble Guillory MD on 9/27/2024  8:01 AM.            Assessment /Plan     For exam results, see Encounter Report.      ICD-10-CM ICD-9-CM    1. Post-operative state  Z98.890 V45.89       2. Primary open angle glaucoma (POAG) of left eye, severe stage  H40.1123 365.11      365.73           S/P SLT left eye   Doing well  Ketorolac QID for 5 days operative eye  Continue glaucoma medications as ordered  RTC 4 weeks                  "

## 2024-10-23 ENCOUNTER — OFFICE VISIT (OUTPATIENT)
Dept: OPHTHALMOLOGY | Facility: CLINIC | Age: 69
End: 2024-10-23
Payer: MEDICARE

## 2024-10-23 DIAGNOSIS — H04.129 DRY EYE: ICD-10-CM

## 2024-10-23 DIAGNOSIS — H40.1123 PRIMARY OPEN ANGLE GLAUCOMA (POAG) OF LEFT EYE, SEVERE STAGE: Primary | ICD-10-CM

## 2024-10-23 DIAGNOSIS — H25.13 NUCLEAR SCLEROSIS, BILATERAL: ICD-10-CM

## 2024-10-23 DIAGNOSIS — H40.1111 PRIMARY OPEN ANGLE GLAUCOMA (POAG) OF RIGHT EYE, MILD STAGE: ICD-10-CM

## 2024-10-23 PROCEDURE — 99999 PR PBB SHADOW E&M-EST. PATIENT-LVL II: CPT | Mod: PBBFAC,,, | Performed by: OPHTHALMOLOGY

## 2024-10-23 PROCEDURE — 99214 OFFICE O/P EST MOD 30 MIN: CPT | Mod: S$GLB,,, | Performed by: OPHTHALMOLOGY

## 2024-10-23 PROCEDURE — 3044F HG A1C LEVEL LT 7.0%: CPT | Mod: CPTII,S$GLB,, | Performed by: OPHTHALMOLOGY

## 2024-10-23 PROCEDURE — 1159F MED LIST DOCD IN RCRD: CPT | Mod: CPTII,S$GLB,, | Performed by: OPHTHALMOLOGY

## 2024-10-23 PROCEDURE — G2211 COMPLEX E/M VISIT ADD ON: HCPCS | Mod: S$GLB,,, | Performed by: OPHTHALMOLOGY

## 2024-10-23 PROCEDURE — 1160F RVW MEDS BY RX/DR IN RCRD: CPT | Mod: CPTII,S$GLB,, | Performed by: OPHTHALMOLOGY

## 2024-10-23 NOTE — PROGRESS NOTES
"HPI     Post-op Evaluation            Comments: SLT OS 1m PO. Compliant with gtts. No pain.           Comments    1. (POAG) left eye, severe stage   (POAG) right eye, mild stage   SLT OS 9/26/24  SLT OS 7/26/22  SLT OD 5/30/24  /472   GCL 26/20-- 2021  GCL 23/17-- 2024  2. NS OU      Latanoprost > Lumigan  **DMX causing low BP per cardiologist  Jennifer "caused twitching"      DMX tablets by mouth- 1 whole am, 1/2 midday and 1 whole at pm  Both eyes- Latanoprost 1x daily   Both eyes- Dorzolamide 2xs daily  Both eyes- Brimonidine 3xs daily  Both eyes- Betaxalol 2xs daily             Last edited by Sigifredo Lyles on 10/23/2024  7:45 AM.            Assessment /Plan     For exam results, see Encounter Report.      ICD-10-CM ICD-9-CM    1. Primary open angle glaucoma (POAG) of left eye, severe stage  H40.1123 365.11 S/p Slt - iop doing well. Explained that ideal treatment would be trab/xen OS. Patient desires to think about it. Explained that a successful surgery would likely result in a decreased need for drops and no longer needing Diamox pills (which are giving her side effects such as ringing in her ears.)    Visit today included increased complexity associated with the care and management of glaucoma and cataract. Written instructions were placed in the portal for the patient upon closure of the encounter regarding specific use of the required medications.          365.73       2. Primary open angle glaucoma (POAG) of right eye, mild stage  H40.1111 365.11 Doing well.     365.71       3. Dry eye  H04.129 375.15 Tolerating meds      4. Nuclear sclerosis, bilateral  H25.13 366.16 Follow for now although probably visually significant.          DMX tablets by mouth- 1 whole am, 1/2 midday and 1 whole at pm  Both eyes- Latanoprost 1x daily   Both eyes- Dorzolamide 2xs daily  Both eyes- Brimonidine 3xs daily  Both eyes- Betaxalol 2xs daily    Return to clinic 2 months for dilation and Hvf                "

## 2024-10-23 NOTE — PATIENT INSTRUCTIONS
DMX tablets by mouth- 1 whole am, 1/2 midday and 1 whole at pm  Both eyes- Latanoprost 1x daily   Both eyes- Dorzolamide 2xs daily  Both eyes- Brimonidine 3xs daily  Both eyes- Betaxalol 2xs daily

## 2024-11-18 ENCOUNTER — OFFICE VISIT (OUTPATIENT)
Dept: CARDIOLOGY | Facility: CLINIC | Age: 69
End: 2024-11-18
Payer: MEDICARE

## 2024-11-18 VITALS
HEART RATE: 60 BPM | DIASTOLIC BLOOD PRESSURE: 72 MMHG | OXYGEN SATURATION: 95 % | HEIGHT: 68 IN | WEIGHT: 241.19 LBS | BODY MASS INDEX: 36.55 KG/M2 | SYSTOLIC BLOOD PRESSURE: 130 MMHG

## 2024-11-18 DIAGNOSIS — E78.5 HYPERLIPIDEMIA, UNSPECIFIED HYPERLIPIDEMIA TYPE: ICD-10-CM

## 2024-11-18 DIAGNOSIS — Z95.5 S/P CORONARY ARTERY STENT PLACEMENT: ICD-10-CM

## 2024-11-18 DIAGNOSIS — I10 ESSENTIAL HYPERTENSION: ICD-10-CM

## 2024-11-18 DIAGNOSIS — Z01.810 PREOP CARDIOVASCULAR EXAM: Primary | ICD-10-CM

## 2024-11-18 DIAGNOSIS — Z98.890 HISTORY OF CORONARY ANGIOGRAM: ICD-10-CM

## 2024-11-18 PROCEDURE — 1101F PT FALLS ASSESS-DOCD LE1/YR: CPT | Mod: CPTII,S$GLB,, | Performed by: INTERNAL MEDICINE

## 2024-11-18 PROCEDURE — 3044F HG A1C LEVEL LT 7.0%: CPT | Mod: CPTII,S$GLB,, | Performed by: INTERNAL MEDICINE

## 2024-11-18 PROCEDURE — 1126F AMNT PAIN NOTED NONE PRSNT: CPT | Mod: CPTII,S$GLB,, | Performed by: INTERNAL MEDICINE

## 2024-11-18 PROCEDURE — 99214 OFFICE O/P EST MOD 30 MIN: CPT | Mod: S$GLB,,, | Performed by: INTERNAL MEDICINE

## 2024-11-18 PROCEDURE — 3075F SYST BP GE 130 - 139MM HG: CPT | Mod: CPTII,S$GLB,, | Performed by: INTERNAL MEDICINE

## 2024-11-18 PROCEDURE — 3008F BODY MASS INDEX DOCD: CPT | Mod: CPTII,S$GLB,, | Performed by: INTERNAL MEDICINE

## 2024-11-18 PROCEDURE — 99999 PR PBB SHADOW E&M-EST. PATIENT-LVL IV: CPT | Mod: PBBFAC,,, | Performed by: INTERNAL MEDICINE

## 2024-11-18 PROCEDURE — 3078F DIAST BP <80 MM HG: CPT | Mod: CPTII,S$GLB,, | Performed by: INTERNAL MEDICINE

## 2024-11-18 PROCEDURE — 3288F FALL RISK ASSESSMENT DOCD: CPT | Mod: CPTII,S$GLB,, | Performed by: INTERNAL MEDICINE

## 2024-11-18 PROCEDURE — 1159F MED LIST DOCD IN RCRD: CPT | Mod: CPTII,S$GLB,, | Performed by: INTERNAL MEDICINE

## 2024-11-18 NOTE — PROGRESS NOTES
Subjective:   Patient ID:  Kate Lira is a 68 y.o. female who presents for evaluation of No chief complaint on file.        HPI  11.18.2024  Comes in for six-month follow-up.    She had her eye surgery done.  Possibly going for another one for glaucoma  Started ozempic recently  Walks 3 times weekly  She was referred to ENT after last visit due to hoarseness.  Told 2/2 to reflux, now resolved  Denies any chest pain, palpitations, syncope or presyncope   No significant lower extremity  No significant dyspnea    5.27.2024  Comes in for a 6 months follow up   Going for an eye procedure this week   Denies any chest pain, we stopped imdur last visit given episodes of hypotension however PCP continued and she is doing fine  States she lowered the diamox she takes for glaucoma  Denies chest pain   No slurred speech , focal weakness or numbness, amaurosis, dizziness       11.27.2023  Comes in for six-month follow-up.    She states that she has chest pain when she moves her arms up.  She is tender also on exam and pain was reproducible with arm movement.    She had a nonobstructive catheterization a year ago.    Her pain does not seem to be angina  She also states that her pressure has been dropping low.  Her primary care physician lowered her isosorbide to 30 and stopped her HCTZ however she states that she still gets episodes of hypotension in the 80s.    She is also taking multiple medications for her glaucoma.  Including acetazolamide and eyedrops.    However no syncope.  No dyspnea on exertion.    No lower extremity swelling.        4.3.2023  Comes in for a 6months follow up  Started diamox as she states for her eye condition   Since then seeing low BP   Her PCP lower toprol to 12.5 mg daily   Still notices low bp at times and feels weak with it sometimes   No syncope   No other cardiac complaints     10.30.2022  Comes in for follow up after cath   History of ostial LAD stent.  With normal nuclear stress  "however abnormal transient ischemic dilation.    Her coronary angiogram showed minimal to mild ISR of her LAD stent.  With mild disease in the LAD otherwise no other significant finding.    She presented the same day again to the hospital with headache.  A CT of the head showed right more than left-sided microvascular changes.    Otherwise no other findings.    She denies any chest pain.  No dyspnea on exertion.  Her groin access site is good.  No other complaints today.      7.25.2022  67 yo female, ostial LAD stent in 2015 with Dr Bateman, no other residual lesions   Had a normal nuclear stress in 2019   Review of her last office visit, mention of sticking underneath the left breast chest pain followed by normal nuc in 2019   Today still complains of moderate chest pain, described as pressure and sharp, mostly for the last week, sometimes " sticks me" , but can be worse with exertion , radiates toward her left shoulder, it was mildy reproducible with lifting arm against resistance  She also complains of THAKKAR NYHA 1 ,  no leg swelling or orthopnea  Denies snoring , or daytime sleepiness   BP mildly elevated today   She does also have asthma but denies any attacks or wheezing     Past Medical History:   Diagnosis Date    Arthritis     Asthma     GERD (gastroesophageal reflux disease)     Glaucoma     Hypertension        Past Surgical History:   Procedure Laterality Date    CATHETERIZATION OF BOTH LEFT AND RIGHT HEART N/A 9/23/2022    Procedure: CATHETERIZATION, HEART, BOTH LEFT AND RIGHT;  Surgeon: Renee Colon MD;  Location: [facility] CATH LAB;  Service: Cardiology;  Laterality: N/A;    CHOLECYSTECTOMY      CORONARY ANGIOGRAPHY N/A 9/23/2022    Procedure: ANGIOGRAM, CORONARY ARTERY;  Surgeon: Renee Colon MD;  Location: [facility] CATH LAB;  Service: Cardiology;  Laterality: N/A;    CORONARY ANGIOPLASTY  02/2015    INJECTION OF ANESTHETIC AGENT AROUND MEDIAL BRANCH NERVES INNERVATING LUMBAR FACET JOINT Bilateral " 2020    Procedure: Bilateral L3-5 MBB with local;  Surgeon: Roger Bhandari MD;  [facility];  Service: Pain Management;  Laterality: Bilateral;    KNEE SURGERY      left    TUBAL LIGATION         Social History     Tobacco Use    Smoking status: Never    Smokeless tobacco: Never   Substance Use Topics    Alcohol use: No    Drug use: No       Family History   Problem Relation Name Age of Onset    Hypertension Mother      Heart disease Mother      Heart attack Mother  64        MI    Diabetes Father      Osteoarthritis Sister      Heart disease Sister      Cancer Sister          1  sister had cervical ca    Osteoarthritis Brother      Heart disease Brother         Review of Systems   Cardiovascular:  Negative for chest pain, dyspnea on exertion, palpitations and syncope.   Genitourinary: Negative.    Neurological: Negative.        Current Outpatient Medications on File Prior to Visit   Medication Sig    acetaminophen (TYLENOL) 650 MG TbSR Take 650 mg by mouth every 8 (eight) hours.    acetaZOLAMIDE (DIAMOX) 250 MG tablet TAKE 1 TABLET(250 MG) BY MOUTH FOUR TIMES DAILY    albuterol (ACCUNEB) 1.25 mg/3 mL Nebu Inhale 1.25 mg into the lungs every 6 (six) hours as needed.    albuterol (PROVENTIL HFA) 90 mcg/actuation inhaler Inhale 2 puffs into the lungs every 4 (four) hours as needed for Wheezing. 2 puffs two times daily    aspirin (ECOTRIN) 81 MG EC tablet Take 1 tablet (81 mg total) by mouth once daily.    aspirin (ECOTRIN) 81 MG EC tablet Take 1 tablet by mouth every morning.    atorvastatin (LIPITOR) 20 MG tablet Take 1 tablet (20 mg total) by mouth every evening.    betaxolol 0.5% (BETOPTIC-S) 0.5 % Drop Place 1 drop into both eyes 2 (two) times daily.    brimonidine 0.2% (ALPHAGAN) 0.2 % Drop PLACE 1 DROP INTO BOTH EYES 3 (THREE) TIMES DAILY.    butalbital-acetaminophen-caffeine -40 mg (FIORICET, ESGIC) -40 mg per tablet Take 1 tablet by mouth every 4 (four) hours as needed  for Pain or Headaches.    carboxymethylcell/hypromellose (GENTEAL GEL OPHT) Apply to eye.    diclofenac sodium (VOLTAREN) 1 % Gel Apply topically.    dorzolamide (TRUSOPT) 2 % ophthalmic solution INSTILL 1 DROP INTO BOTH EYES TWICE DAILY    empagliflozin (JARDIANCE) 10 mg tablet Take 1 tablet by mouth every morning.    fluticasone furoate-vilanterol (BREO) 100-25 mcg/dose diskus inhaler Inhale 1 puff into the lungs.    hydrOXYzine (VISTARIL) 100 MG capsule Take 1 capsule by mouth 2 (two) times daily as needed.    isosorbide mononitrate (IMDUR) 30 MG 24 hr tablet Take 30 mg by mouth once daily.    latanoprost 0.005 % ophthalmic solution INSTILL 1 DROP INTO BOTH EYES EVERY EVENING.    metoprolol succinate (TOPROL-XL) 25 MG 24 hr tablet Take 12.5 mg by mouth.    midodrine (PROAMATINE) 2.5 MG Tab Take 1 tablet (2.5 mg total) by mouth daily as needed (BP below 90).    nitroGLYCERIN (NITROSTAT) 0.4 MG SL tablet Place 1 tablet (0.4 mg total) under the tongue every 5 (five) minutes as needed for Chest pain.    ondansetron (ZOFRAN-ODT) 4 MG TbDL Take 1 tablet (4 mg total) by mouth every 6 (six) hours as needed.    oxybutynin (DITROPAN XL) 15 MG TR24 Take 5 mg by mouth once daily.    oxybutynin (DITROPAN-XL) 10 MG 24 hr tablet Take 10 mg by mouth every morning.    pantoprazole (PROTONIX) 40 MG tablet TAKE 1 TABLET(40 MG) BY MOUTH TWICE DAILY    SYMBICORT 80-4.5 mcg/actuation HFAA Inhale 2 puffs into the lungs 2 (two) times daily.    tamsulosin (FLOMAX) 0.4 mg Cap Take 1 capsule by mouth once daily.    traMADoL (ULTRAM) 50 mg tablet Take 50 mg by mouth 3 (three) times daily as needed.    TRELEGY ELLIPTA 100-62.5-25 mcg DsDv Inhale 1 puff into the lungs.    vitamin D (VITAMIN D3) 1000 units Tab Take by mouth.     No current facility-administered medications on file prior to visit.       Objective:   Objective:  Wt Readings from Last 3 Encounters:   11/18/24 109.4 kg (241 lb 2.9 oz)   05/27/24 108.7 kg (239 lb 10.2 oz)    11/27/23 109.8 kg (242 lb 1 oz)     Temp Readings from Last 3 Encounters:   10/25/22 99.7 °F (37.6 °C) (Oral)   09/23/22 98.3 °F (36.8 °C) (Oral)   09/23/22 97.8 °F (36.6 °C) (Temporal)     BP Readings from Last 3 Encounters:   11/18/24 130/72   05/27/24 128/72   11/27/23 126/82     Pulse Readings from Last 3 Encounters:   11/18/24 60   05/27/24 69   11/27/23 62       Physical Exam  Vitals reviewed.   Constitutional:       Appearance: She is well-developed.   Neck:      Vascular: No carotid bruit.   Cardiovascular:      Rate and Rhythm: Normal rate and regular rhythm.      Pulses: Intact distal pulses.      Heart sounds: Normal heart sounds. No murmur heard.  Pulmonary:      Breath sounds: Normal breath sounds.   Neurological:      Mental Status: She is oriented to person, place, and time.         Lab Results   Component Value Date    CHOL 133 10/03/2023    CHOL 131 09/30/2022    CHOL 138 09/27/2021     Lab Results   Component Value Date    HDL 51 10/03/2023    HDL 43 09/30/2022    HDL 42 09/27/2021     Lab Results   Component Value Date    LDLCALC 71 10/03/2023    LDLCALC 75 09/30/2022    LDLCALC 84 09/27/2021     Lab Results   Component Value Date    TRIG 48 10/03/2023    TRIG 60 09/30/2022    TRIG 54 09/27/2021     Lab Results   Component Value Date    CHOLHDL 31.3 02/07/2017    CHOLHDL 30.6 07/29/2016    CHOLHDL 30.6 04/20/2015       Chemistry        Component Value Date/Time     10/25/2022 0638    K 3.5 10/25/2022 0638     10/25/2022 0638    CO2 27 10/25/2022 0638    BUN 20 10/25/2022 0638    CREATININE 1.4 10/25/2022 0638     (H) 10/25/2022 0638        Component Value Date/Time    CALCIUM 9.5 10/25/2022 0638    ALKPHOS 55 10/25/2022 0638    AST 23 10/25/2022 0638    ALT 26 10/25/2022 0638    BILITOT 0.4 10/25/2022 0638    ESTGFRAFRICA 39 (A) 07/18/2020 1425    EGFRNONAA 34 (A) 07/18/2020 1425          Lab Results   Component Value Date    TSH 1.640 10/03/2023     Lab Results   Component  Value Date    INR 1.0 09/08/2022    INR 1.1 12/07/2016    INR 1.0 01/28/2015     Lab Results   Component Value Date    WBC 5.0 10/03/2023    HGB 13.3 10/03/2023    HCT 42.8 10/03/2023    MCV 96 10/25/2022     (L) 10/03/2023     BNP  @LABRCNTIP(BNP,BNPTRIAGEBLO)@  CrCl cannot be calculated (Patient's most recent lab result is older than the maximum 7 days allowed.).     Imaging:  ======  No results found for this or any previous visit.    No results found for this or any previous visit.    No results found for this or any previous visit.    Results for orders placed during the hospital encounter of 12/07/16    X-Ray Chest PA And Lateral    Narrative  EXAM: Chest X-ray PA and Lateral    CLINICAL HISTORY:     Chest pain, unspecified    COMPARISON STUDIES:     01/28/2015    FINDINGS:  Cardiomegaly.  Tortuous descending thoracic aorta.  Lungs appear clear.  Thoracic scoliosis convex to the right.. Ribs appear intact.  IMPRESSION:  No acute findings.  Please see above      Electronically signed by: WILLI MARTINEZ MD  Date:     12/07/16  Time:    08:02    No results found for this or any previous visit.    No valid procedures specified.    Diagnostic Results:  ECG: Reviewed  Nsr., lvh      Results for orders placed during the hospital encounter of 09/23/22    Cardiac catheterization    Conclusion    The pre-procedure left ventricular end diastolic pressure was 8.    The estimated blood loss was none.    There was non-obstructive coronary artery disease..    The filling pressures on the right and left were normal.    Patent ostial LAD stent with minimal ISR    The procedure log was documented by Documenter: Sirena Ugalde RN and verified by Renee Colon MD.    Date: 9/23/2022  Time: 10:40 AM    The 10-year ASCVD risk score (Chela DK, et al., 2019) is: 8.1%    Values used to calculate the score:      Age: 68 years      Sex: Female      Is Non- : Yes      Diabetic: No      Tobacco  smoker: No      Systolic Blood Pressure: 130 mmHg      Is BP treated: Yes      HDL Cholesterol: 51 mg/dL      Total Cholesterol: 133 mg/dL    Assessment and Plan:   Preop cardiovascular exam    BMI 36.0-36.9,adult    S/P coronary artery stent placement    History of coronary angiogram    Hyperlipidemia, unspecified hyperlipidemia type    Essential hypertension        cw asa, lipitor and toprol ,  Has midodrine p.r.n.  Still taking isosorbide  Ok to undergo her eye surgery from cardiac standpoint, continue metoprolol periop   Ok to stop aspirin for 1 week prior to possible eye surgery  Noncardiac chest pain.  Mild Nonobstructive CAD on catheterization 2022  Reviewed all tests and above medical conditions with patient in detail and formulated treatment plan.  Risk factor modification discussed.   Cardiac low salt diet discussed.  Maintaining healthy weight and weight loss goals were discussed in clinic.    Follow up in 6 months

## 2024-12-10 ENCOUNTER — OFFICE VISIT (OUTPATIENT)
Dept: OPHTHALMOLOGY | Facility: CLINIC | Age: 69
End: 2024-12-10
Payer: MEDICARE

## 2024-12-10 DIAGNOSIS — H04.129 DRY EYE: ICD-10-CM

## 2024-12-10 DIAGNOSIS — H40.1111 PRIMARY OPEN ANGLE GLAUCOMA (POAG) OF RIGHT EYE, MILD STAGE: ICD-10-CM

## 2024-12-10 DIAGNOSIS — H40.1123 PRIMARY OPEN ANGLE GLAUCOMA (POAG) OF LEFT EYE, SEVERE STAGE: Primary | ICD-10-CM

## 2024-12-10 PROCEDURE — 99999 PR PBB SHADOW E&M-EST. PATIENT-LVL III: CPT | Mod: PBBFAC,,, | Performed by: OPHTHALMOLOGY

## 2024-12-10 PROCEDURE — 1160F RVW MEDS BY RX/DR IN RCRD: CPT | Mod: CPTII,S$GLB,, | Performed by: OPHTHALMOLOGY

## 2024-12-10 PROCEDURE — G2211 COMPLEX E/M VISIT ADD ON: HCPCS | Mod: S$GLB,,, | Performed by: OPHTHALMOLOGY

## 2024-12-10 PROCEDURE — 92083 EXTENDED VISUAL FIELD XM: CPT | Mod: S$GLB,,, | Performed by: OPHTHALMOLOGY

## 2024-12-10 PROCEDURE — 3044F HG A1C LEVEL LT 7.0%: CPT | Mod: CPTII,S$GLB,, | Performed by: OPHTHALMOLOGY

## 2024-12-10 PROCEDURE — 1159F MED LIST DOCD IN RCRD: CPT | Mod: CPTII,S$GLB,, | Performed by: OPHTHALMOLOGY

## 2024-12-10 PROCEDURE — 99214 OFFICE O/P EST MOD 30 MIN: CPT | Mod: S$GLB,,, | Performed by: OPHTHALMOLOGY

## 2024-12-10 PROCEDURE — 3066F NEPHROPATHY DOC TX: CPT | Mod: CPTII,S$GLB,, | Performed by: OPHTHALMOLOGY

## 2024-12-10 PROCEDURE — 3061F NEG MICROALBUMINURIA REV: CPT | Mod: CPTII,S$GLB,, | Performed by: OPHTHALMOLOGY

## 2024-12-10 RX ORDER — SEMAGLUTIDE 0.68 MG/ML
0.5 INJECTION, SOLUTION SUBCUTANEOUS
COMMUNITY
Start: 2024-10-10

## 2024-12-10 NOTE — PROGRESS NOTES
"HPI     Glaucoma            Comments: Pt here for Visual Field and Iop check. Using gtts as directed.   VA stable.           Comments    1. (POAG) left eye, severe stage   (POAG) right eye, mild stage   SLT OS 9/26/24  SLT OS 7/26/22  SLT OD 5/30/24  /472   GCL 26/20-- 2021  GCL 23/17-- 2024  2. NS OU      Latanoprost > Lumigan  **DMX causing low BP per cardiologist  Jennifer "caused twitching"      DMX tablets by mouth- 1 whole am, 1/2 midday and 1 whole at pm  Both eyes- Latanoprost 1x daily   Both eyes- Dorzolamide 2xs daily  Both eyes- Brimonidine 3xs daily  Both eyes- Betaxalol 2xs daily             Last edited by Sirena Medrano on 12/10/2024  8:08 AM.            Assessment /Plan     For exam results, see Encounter Report.      ICD-10-CM ICD-9-CM    1. Primary open angle glaucoma (POAG) of left eye, severe stage  H40.1123 365.11 [place] Visual Field - OU - Extended - Both Eyes    IOP slightly above target today  Pt defers any additional intervention at this time   Will continue to monitor with current routine     Visit today included increased complexity associated with the care and management of glaucoma. Patient aware that management of medical condition requires long term follow up.  Written instructions were placed in the portal for the patient upon closure of the encounter regarding specific use of the required medications.     365.73       2. Primary open angle glaucoma (POAG) of right eye, mild stage  H40.1111 365.11 See above      365.71       3. Dry eye  H04.129 375.15 Recommend Systane gel qpm          Return to clinic 3 months for IOP        DMX tablets by mouth- 1 whole am, 1/2 midday and 1 whole at pm  Both eyes- Latanoprost 1x daily   Both eyes- Dorzolamide 2xs daily  Both eyes- Brimonidine 3xs daily  Both eyes- Betaxalol 2xs daily         "

## 2024-12-11 NOTE — PROGRESS NOTES
"HPI     Glaucoma            Comments: 1 month RTC and Dilation. VA is doing well, no changes. Denied   no pain or irritation. Compliant with gtts.          Comments    Primary open angle glaucoma (POAG) of left eye, severe stage   Primary open angle glaucoma (POAG) of right eye, mild stage   Nuclear sclerosis, bilateral   Myopia with astigmatism and presbyopia, bilateral   SLT OS 7/26/22    /472     GCL: 26/20 10/2021  Gcl: 25/19 10/19/2022    + ASTHMA     Latanoprost more effective than Lumigan  **DMX causing low BP per cardiologist**  Rocklatan "caused twitching"      *FLOMAX*   + ASA     Latanoprost qhs OU  Dorzolamide BID OU   Brimonidine OU TID   Betaxalol BID OU  Genteal Gel QHS OU  DMX 1/2 250 pill BID            Last edited by Noble Guillory MD on 9/20/2023  9:05 AM.            Assessment /Plan     For exam results, see Encounter Report.      ICD-10-CM ICD-9-CM    1. Primary open angle glaucoma (POAG) of left eye, severe stage  H40.1123 365.11 Iop improved today - follow      365.73       2. Primary open angle glaucoma (POAG) of right eye, mild stage  H40.1111 365.11 See above      365.71       3. Dry eye  H04.129 375.15 Follow       4. Nuclear sclerosis, bilateral  H25.13 366.16 Follow       5. Non-compliance  Z91.199 V15.81 Pt now compliant with meds and eye pressure improved           Try increasing DMX to 1/2 pill 4 times daily   RETURN TO CLINIC 3 month IOP     Latanoprost qhs OU  Dorzolamide BID OU   Brimonidine OU TID   Betaxalol BID OU  Genteal Gel QHS OU  DMX 1/2 250 pill QID               " Copied from CRM #[id number]. Topic: MW Schedule Appointment - MW Schedule Primary Care  >> Dec 11, 2024  3:00 PM Mirtha GIBSON wrote:  Nolvia Leo called requesting to schedule a primary care visit with a Clinician. Checked insurance.  Looked for any active requests, recalls, service to orders, scheduling tickets prior to scheduling. The decision tree DT PC Was used for scheduling (an)     Non-Acute need.  Scheduled and Patient added to waitlist. Read back appointment details. Appt is greater than 3 days, no further action is needed. Selected 'Wrap Up CRM' and chose approp-- DO NOT REPLY / DO NOT REPLY ALL --  -- This inbox is not monitored. If this was sent to the wrong provider or department, reroute message to  ECO Reroute pool. --  -- Message is from Engagement Center Operations (ECO) --      Message Type:  Refill Medication   Refill request for Pended medication named: spironolactone (ALDACTONE) 25 MG tablet   Preferred pharmacy verified, and selected.   [id number] - [place], [place] - [address] AT [facility]    Is the patient OUT of Medication?  Yes and During Work Hours Medication Refills handled by Care Site - route as high priority to care site pool on KB. Patient has been advised it will be addressed within 1 business day.     Message: Patient has a appointment scheduled on 2/18                riate 'Resolve' reason.

## 2025-01-09 DIAGNOSIS — H40.1123 PRIMARY OPEN ANGLE GLAUCOMA (POAG) OF LEFT EYE, SEVERE STAGE: ICD-10-CM

## 2025-01-09 DIAGNOSIS — H40.1111 PRIMARY OPEN ANGLE GLAUCOMA (POAG) OF RIGHT EYE, MILD STAGE: ICD-10-CM

## 2025-01-09 RX ORDER — BETAXOLOL HYDROCHLORIDE 5 MG/ML
1 SOLUTION/ DROPS OPHTHALMIC 2 TIMES DAILY
Qty: 30 ML | Refills: 6 | Status: SHIPPED | OUTPATIENT
Start: 2025-01-09 | End: 2025-04-09

## 2025-03-11 ENCOUNTER — OFFICE VISIT (OUTPATIENT)
Dept: OPHTHALMOLOGY | Facility: CLINIC | Age: 70
End: 2025-03-11
Payer: MEDICARE

## 2025-03-11 DIAGNOSIS — H04.129 DRY EYE: ICD-10-CM

## 2025-03-11 DIAGNOSIS — H40.1111 PRIMARY OPEN ANGLE GLAUCOMA (POAG) OF RIGHT EYE, MILD STAGE: ICD-10-CM

## 2025-03-11 DIAGNOSIS — H25.13 NUCLEAR SCLEROSIS, BILATERAL: ICD-10-CM

## 2025-03-11 DIAGNOSIS — H40.1123 PRIMARY OPEN ANGLE GLAUCOMA (POAG) OF LEFT EYE, SEVERE STAGE: Primary | ICD-10-CM

## 2025-03-11 PROCEDURE — 1160F RVW MEDS BY RX/DR IN RCRD: CPT | Mod: CPTII,S$GLB,, | Performed by: OPHTHALMOLOGY

## 2025-03-11 PROCEDURE — 99214 OFFICE O/P EST MOD 30 MIN: CPT | Mod: S$GLB,,, | Performed by: OPHTHALMOLOGY

## 2025-03-11 PROCEDURE — 3066F NEPHROPATHY DOC TX: CPT | Mod: CPTII,S$GLB,, | Performed by: OPHTHALMOLOGY

## 2025-03-11 PROCEDURE — 99999 PR PBB SHADOW E&M-EST. PATIENT-LVL III: CPT | Mod: PBBFAC,,, | Performed by: OPHTHALMOLOGY

## 2025-03-11 PROCEDURE — 3061F NEG MICROALBUMINURIA REV: CPT | Mod: CPTII,S$GLB,, | Performed by: OPHTHALMOLOGY

## 2025-03-11 PROCEDURE — 1159F MED LIST DOCD IN RCRD: CPT | Mod: CPTII,S$GLB,, | Performed by: OPHTHALMOLOGY

## 2025-03-11 PROCEDURE — 3044F HG A1C LEVEL LT 7.0%: CPT | Mod: CPTII,S$GLB,, | Performed by: OPHTHALMOLOGY

## 2025-03-11 RX ORDER — BETAXOLOL HYDROCHLORIDE 5 MG/ML
1 SOLUTION/ DROPS OPHTHALMIC 2 TIMES DAILY
Qty: 30 ML | Refills: 6 | Status: SHIPPED | OUTPATIENT
Start: 2025-03-11 | End: 2025-06-09

## 2025-03-11 NOTE — PROGRESS NOTES
"HPI     Glaucoma            Comments: 3 month IOP check    Patient states no changes in VA and has been lacking on using her drops   but states "will get back on track".          Comments    1. (POAG) left eye, severe stage   (POAG) right eye, mild stage   SLT OS 9/26/24  SLT OS 7/26/22  SLT OD 5/30/24  /472   GCL 26/20-- 2021  GCL 23/17-- 2024  2. NS OU      Latanoprost > Lumigan  **DMX causing low BP per cardiologist  Jennifer "caused twitching"      DMX tablets by mouth- 1 whole am, 1/2 midday and 1 whole at pm  Both eyes- Latanoprost 1x daily   Both eyes- Dorzolamide 2xs daily  Both eyes- Brimonidine 3xs daily  Both eyes- Betaxalol 2xs daily             Last edited by Alma Pettit, Patient Care Assistant on 3/11/2025  9:27   AM.            Assessment /Plan     For exam results, see Encounter Report.      ICD-10-CM ICD-9-CM    1. Primary open angle glaucoma (POAG) of left eye, severe stage  H40.1123 365.11 IOP well controlled with drop compliance      365.73       2. Primary open angle glaucoma (POAG) of right eye, mild stage  H40.1111 365.11 See above      365.71       3. Dry eye  H04.129 375.15 Monitor       4. Nuclear sclerosis, bilateral  H25.13 366.16 You were found to have an early cataract in your eye(s) today. However the cataract is not affecting your activities of daily living, such as reading and driving. You do not need surgery at this time. We will recheck your cataract at your next visit. You are welcome to call for an earlier appointment if your vision gets worse.             Return to clinic 2-3 months for DFE and GOCT      DMX tablets by mouth- 1 whole am, 1/2 midday and 1 whole at pm  Both eyes- Latanoprost 1x daily   Both eyes- Dorzolamide 2xs daily  Both eyes- Brimonidine 3xs daily  Both eyes- Betaxalol 2xs daily                 "

## 2025-04-14 DIAGNOSIS — H40.1111 PRIMARY OPEN ANGLE GLAUCOMA (POAG) OF RIGHT EYE, MILD STAGE: ICD-10-CM

## 2025-04-14 DIAGNOSIS — H40.1123 PRIMARY OPEN ANGLE GLAUCOMA (POAG) OF LEFT EYE, SEVERE STAGE: ICD-10-CM

## 2025-04-14 RX ORDER — BRIMONIDINE TARTRATE 2 MG/ML
1 SOLUTION/ DROPS OPHTHALMIC 3 TIMES DAILY
Qty: 25 ML | Refills: 3 | Status: SHIPPED | OUTPATIENT
Start: 2025-04-14 | End: 2025-07-13

## 2025-06-03 ENCOUNTER — OFFICE VISIT (OUTPATIENT)
Dept: OPHTHALMOLOGY | Facility: CLINIC | Age: 70
End: 2025-06-03
Payer: MEDICARE

## 2025-06-03 DIAGNOSIS — H25.13 NUCLEAR SCLEROSIS, BILATERAL: ICD-10-CM

## 2025-06-03 DIAGNOSIS — H04.129 DRY EYE: ICD-10-CM

## 2025-06-03 DIAGNOSIS — H40.1111 PRIMARY OPEN ANGLE GLAUCOMA (POAG) OF RIGHT EYE, MILD STAGE: ICD-10-CM

## 2025-06-03 DIAGNOSIS — H40.1123 PRIMARY OPEN ANGLE GLAUCOMA (POAG) OF LEFT EYE, SEVERE STAGE: Primary | ICD-10-CM

## 2025-06-03 PROCEDURE — 3061F NEG MICROALBUMINURIA REV: CPT | Mod: CPTII,S$GLB,, | Performed by: OPHTHALMOLOGY

## 2025-06-03 PROCEDURE — 99999 PR PBB SHADOW E&M-EST. PATIENT-LVL III: CPT | Mod: PBBFAC,,, | Performed by: OPHTHALMOLOGY

## 2025-06-03 PROCEDURE — 92133 CPTRZD OPH DX IMG PST SGM ON: CPT | Mod: S$GLB,,, | Performed by: OPHTHALMOLOGY

## 2025-06-03 PROCEDURE — 3044F HG A1C LEVEL LT 7.0%: CPT | Mod: CPTII,S$GLB,, | Performed by: OPHTHALMOLOGY

## 2025-06-03 PROCEDURE — 3066F NEPHROPATHY DOC TX: CPT | Mod: CPTII,S$GLB,, | Performed by: OPHTHALMOLOGY

## 2025-06-03 PROCEDURE — G2211 COMPLEX E/M VISIT ADD ON: HCPCS | Mod: S$GLB,,, | Performed by: OPHTHALMOLOGY

## 2025-06-03 PROCEDURE — 99214 OFFICE O/P EST MOD 30 MIN: CPT | Mod: S$GLB,,, | Performed by: OPHTHALMOLOGY

## 2025-06-03 PROCEDURE — 1159F MED LIST DOCD IN RCRD: CPT | Mod: CPTII,S$GLB,, | Performed by: OPHTHALMOLOGY

## 2025-06-03 PROCEDURE — 1160F RVW MEDS BY RX/DR IN RCRD: CPT | Mod: CPTII,S$GLB,, | Performed by: OPHTHALMOLOGY

## 2025-06-09 ENCOUNTER — OFFICE VISIT (OUTPATIENT)
Dept: CARDIOLOGY | Facility: CLINIC | Age: 70
End: 2025-06-09
Payer: MEDICARE

## 2025-06-09 ENCOUNTER — HOSPITAL ENCOUNTER (OUTPATIENT)
Dept: CARDIOLOGY | Facility: HOSPITAL | Age: 70
Discharge: HOME | End: 2025-06-09
Attending: INTERNAL MEDICINE
Payer: MEDICARE

## 2025-06-09 VITALS
WEIGHT: 210.31 LBS | BODY MASS INDEX: 31.98 KG/M2 | DIASTOLIC BLOOD PRESSURE: 72 MMHG | SYSTOLIC BLOOD PRESSURE: 124 MMHG | OXYGEN SATURATION: 100 % | HEART RATE: 67 BPM

## 2025-06-09 DIAGNOSIS — Z95.5 S/P CORONARY ARTERY STENT PLACEMENT: Primary | ICD-10-CM

## 2025-06-09 DIAGNOSIS — I95.9 HYPOTENSION, UNSPECIFIED HYPOTENSION TYPE: ICD-10-CM

## 2025-06-09 DIAGNOSIS — J45.909 ASTHMA, UNSPECIFIED ASTHMA SEVERITY, UNSPECIFIED WHETHER COMPLICATED, UNSPECIFIED WHETHER PERSISTENT: ICD-10-CM

## 2025-06-09 DIAGNOSIS — Z98.890 HISTORY OF CORONARY ANGIOGRAM: ICD-10-CM

## 2025-06-09 DIAGNOSIS — R53.83 OTHER FATIGUE: ICD-10-CM

## 2025-06-09 DIAGNOSIS — E78.5 HYPERLIPIDEMIA, UNSPECIFIED HYPERLIPIDEMIA TYPE: ICD-10-CM

## 2025-06-09 LAB
OHS QRS DURATION: 100 MS
OHS QTC CALCULATION: 405 MS

## 2025-06-09 PROCEDURE — 3008F BODY MASS INDEX DOCD: CPT | Mod: CPTII,S$GLB,, | Performed by: INTERNAL MEDICINE

## 2025-06-09 PROCEDURE — 1101F PT FALLS ASSESS-DOCD LE1/YR: CPT | Mod: CPTII,S$GLB,, | Performed by: INTERNAL MEDICINE

## 2025-06-09 PROCEDURE — 3044F HG A1C LEVEL LT 7.0%: CPT | Mod: CPTII,S$GLB,, | Performed by: INTERNAL MEDICINE

## 2025-06-09 PROCEDURE — 1159F MED LIST DOCD IN RCRD: CPT | Mod: CPTII,S$GLB,, | Performed by: INTERNAL MEDICINE

## 2025-06-09 PROCEDURE — 3078F DIAST BP <80 MM HG: CPT | Mod: CPTII,S$GLB,, | Performed by: INTERNAL MEDICINE

## 2025-06-09 PROCEDURE — 93010 ELECTROCARDIOGRAM REPORT: CPT | Mod: S$GLB,,, | Performed by: INTERNAL MEDICINE

## 2025-06-09 PROCEDURE — 3066F NEPHROPATHY DOC TX: CPT | Mod: CPTII,S$GLB,, | Performed by: INTERNAL MEDICINE

## 2025-06-09 PROCEDURE — 3288F FALL RISK ASSESSMENT DOCD: CPT | Mod: CPTII,S$GLB,, | Performed by: INTERNAL MEDICINE

## 2025-06-09 PROCEDURE — 99999 PR PBB SHADOW E&M-EST. PATIENT-LVL V: CPT | Mod: PBBFAC,,, | Performed by: INTERNAL MEDICINE

## 2025-06-09 PROCEDURE — 93005 ELECTROCARDIOGRAM TRACING: CPT

## 2025-06-09 PROCEDURE — 3074F SYST BP LT 130 MM HG: CPT | Mod: CPTII,S$GLB,, | Performed by: INTERNAL MEDICINE

## 2025-06-09 PROCEDURE — 3061F NEG MICROALBUMINURIA REV: CPT | Mod: CPTII,S$GLB,, | Performed by: INTERNAL MEDICINE

## 2025-06-09 PROCEDURE — 1126F AMNT PAIN NOTED NONE PRSNT: CPT | Mod: CPTII,S$GLB,, | Performed by: INTERNAL MEDICINE

## 2025-06-09 PROCEDURE — 99214 OFFICE O/P EST MOD 30 MIN: CPT | Mod: S$GLB,,, | Performed by: INTERNAL MEDICINE

## 2025-06-09 RX ORDER — ISOSORBIDE MONONITRATE 30 MG/1
30 TABLET, EXTENDED RELEASE ORAL DAILY
Qty: 90 TABLET | Refills: 3 | Status: SHIPPED | OUTPATIENT
Start: 2025-06-09

## 2025-06-09 NOTE — PROGRESS NOTES
Subjective:   Patient ID:  06/09/2025      Kate Lira is a 69 y.o. female who presents for evaluation of Follow-up      History of Present Illness    CHIEF COMPLAINT:  Patient presents today for follow up of fatigue    SLEEP AND FATIGUE:  She reports persistent fatigue and daytime somnolence with frequent dozing. She has an irregular sleep schedule, going to bed at 5am with her main sleep period from 7pm to 9-10pm. She acknowledges watching TV late at night. She endorses excessive sleepiness and snoring during sleep, but denies dyspnea when lying down or feeling smothered. She describes lack of motivation for daily activities, though continues to perform necessary household tasks before needing to rest.    WEIGHT MANAGEMENT:  She reports significant weight loss of 30 lbs since her last visit, with total weight decrease from 270 lbs to 210 lbs, representing a 60-pound loss overall.    MEDICATIONS:  She takes Midrine as needed for low BP, having only used it 2 times. She drinks decaf coffee in the morning.    BLOOD PRESSURE:  Home BP monitoring shows good readings.    OCULAR:  She recently underwent laser eye surgery. Oral medications have improved her eye pressure compared to previous eye drops, confirmed at last week's eye doctor visit.    LABS:  A1C was 5.7. Vitamin D level was 40.9.         HPI    Past Medical History:   Diagnosis Date    Arthritis     Asthma     GERD (gastroesophageal reflux disease)     Glaucoma     Hypertension        Past Surgical History:   Procedure Laterality Date    CATHETERIZATION OF BOTH LEFT AND RIGHT HEART N/A 9/23/2022    Procedure: CATHETERIZATION, HEART, BOTH LEFT AND RIGHT;  Surgeon: Renee Colon MD;  Location: [facility] CATH LAB;  Service: Cardiology;  Laterality: N/A;    CHOLECYSTECTOMY      CORONARY ANGIOGRAPHY N/A 9/23/2022    Procedure: ANGIOGRAM, CORONARY ARTERY;  Surgeon: Renee Colon MD;  Location: [facility] CATH LAB;  Service: Cardiology;  Laterality: N/A;     CORONARY ANGIOPLASTY  2015    INJECTION OF ANESTHETIC AGENT AROUND MEDIAL BRANCH NERVES INNERVATING LUMBAR FACET JOINT Bilateral 2020    Procedure: Bilateral L3-5 MBB with local;  Surgeon: Roger Bhandari MD;  Location: [facility];  Service: Pain Management;  Laterality: Bilateral;    KNEE SURGERY      left    TUBAL LIGATION         Social History[1]    Family History   Problem Relation Name Age of Onset    Hypertension Mother      Heart disease Mother      Heart attack Mother  64        MI    Diabetes Father      Osteoarthritis Sister      Heart disease Sister      Cancer Sister          1  sister had cervical ca    Osteoarthritis Brother      Heart disease Brother           ROS    Current Outpatient Medications on File Prior to Visit   Medication Sig    acetaminophen (TYLENOL) 650 MG TbSR Take 650 mg by mouth every 8 (eight) hours.    acetaZOLAMIDE (DIAMOX) 250 MG tablet TAKE 1 TABLET(250 MG) BY MOUTH FOUR TIMES DAILY    albuterol (ACCUNEB) 1.25 mg/3 mL Nebu Inhale 1.25 mg into the lungs every 6 (six) hours as needed.    albuterol (PROVENTIL HFA) 90 mcg/actuation inhaler Inhale 2 puffs into the lungs every 4 (four) hours as needed for Wheezing. 2 puffs two times daily    aspirin (ECOTRIN) 81 MG EC tablet Take 1 tablet (81 mg total) by mouth once daily.    aspirin (ECOTRIN) 81 MG EC tablet Take 1 tablet by mouth every morning.    atorvastatin (LIPITOR) 20 MG tablet Take 1 tablet (20 mg total) by mouth every evening.    betaxolol 0.5% 0.5 % Drop Place 1 drop into both eyes 2 (two) times daily.    brimonidine 0.2% (ALPHAGAN) 0.2 % Drop Place 1 drop into both eyes 3 (three) times daily.    butalbital-acetaminophen-caffeine -40 mg (FIORICET, ESGIC) -40 mg per tablet Take 1 tablet by mouth every 4 (four) hours as needed for Pain or Headaches.    carboxymethylcell/hypromellose (GENTEAL GEL OPHT) Apply to eye.    diclofenac sodium (VOLTAREN) 1 % Gel Apply topically.    dorzolamide  (TRUSOPT) 2 % ophthalmic solution INSTILL 1 DROP INTO BOTH EYES TWICE DAILY    empagliflozin (JARDIANCE) 10 mg tablet Take 1 tablet by mouth every morning.    fluticasone furoate-vilanterol (BREO) 100-25 mcg/dose diskus inhaler Inhale 1 puff into the lungs.    hydrOXYzine (VISTARIL) 100 MG capsule Take 1 capsule by mouth 2 (two) times daily as needed.    latanoprost 0.005 % ophthalmic solution INSTILL 1 DROP INTO BOTH EYES EVERY EVENING.    metoprolol succinate (TOPROL-XL) 25 MG 24 hr tablet Take 12.5 mg by mouth.    midodrine (PROAMATINE) 2.5 MG Tab Take 1 tablet (2.5 mg total) by mouth daily as needed (BP below 90).    ondansetron (ZOFRAN-ODT) 4 MG TbDL Take 1 tablet (4 mg total) by mouth every 6 (six) hours as needed.    oxybutynin (DITROPAN XL) 15 MG TR24 Take 5 mg by mouth once daily.    oxybutynin (DITROPAN-XL) 10 MG 24 hr tablet Take 10 mg by mouth every morning.    pantoprazole (PROTONIX) 40 MG tablet TAKE 1 TABLET(40 MG) BY MOUTH TWICE DAILY    semaglutide (OZEMPIC) 0.25 mg or 0.5 mg (2 mg/3 mL) pen injector Inject 0.5 mg into the skin every 7 days.    SYMBICORT 80-4.5 mcg/actuation HFAA Inhale 2 puffs into the lungs 2 (two) times daily.    tamsulosin (FLOMAX) 0.4 mg Cap Take 1 capsule by mouth once daily.    traMADoL (ULTRAM) 50 mg tablet Take 50 mg by mouth 3 (three) times daily as needed.    TRELEGY ELLIPTA 100-62.5-25 mcg DsDv Inhale 1 puff into the lungs.    vitamin D (VITAMIN D3) 1000 units Tab Take by mouth.    [DISCONTINUED] isosorbide mononitrate (IMDUR) 30 MG 24 hr tablet Take 30 mg by mouth once daily.    nitroGLYCERIN (NITROSTAT) 0.4 MG SL tablet Place 1 tablet (0.4 mg total) under the tongue every 5 (five) minutes as needed for Chest pain.     No current facility-administered medications on file prior to visit.       Objective:   Objective:  Wt Readings from Last 3 Encounters:   06/09/25 95.4 kg (210 lb 5.1 oz)   11/18/24 109.4 kg (241 lb 2.9 oz)   05/27/24 108.7 kg (239 lb 10.2 oz)     Temp  Readings from Last 3 Encounters:   10/25/22 99.7 °F (37.6 °C) (Oral)   09/23/22 98.3 °F (36.8 °C) (Oral)   09/23/22 97.8 °F (36.6 °C) (Temporal)     BP Readings from Last 3 Encounters:   06/09/25 124/72   11/18/24 130/72   05/27/24 128/72     Pulse Readings from Last 3 Encounters:   06/09/25 67   11/18/24 60   05/27/24 69       Physical Exam      Lab Results   Component Value Date    CHOL 127 01/24/2025    CHOL 133 10/03/2023    CHOL 131 09/30/2022     Lab Results   Component Value Date    LDLCALC 69 01/24/2025    LDLCALC 71 10/03/2023    LDLCALC 75 09/30/2022         Chemistry        Component Value Date/Time     10/25/2022 0638    K 3.5 10/25/2022 0638     10/25/2022 0638    CO2 27 10/25/2022 0638    BUN 20 10/25/2022 0638    CREATININE 1.4 10/25/2022 0638     (H) 10/25/2022 0638        Component Value Date/Time    CALCIUM 9.5 10/25/2022 0638    ALKPHOS 55 10/25/2022 0638    AST 23 10/25/2022 0638    ALT 26 10/25/2022 0638    BILITOT 0.4 10/25/2022 0638    ESTGFRAFRICA 39 (A) 07/18/2020 1425    EGFRNONAA 34 (A) 07/18/2020 1425          Lab Results   Component Value Date    TSH 2.22 01/24/2025       Lab Results   Component Value Date    WBC 5 01/24/2025    HGB 13.1 01/24/2025    HCT 42.5 01/24/2025    MCV 96 10/25/2022     01/24/2025       @LABRCNTIP(BNP,BNPTRIAGEBLO)@       Imaging:  ======    No results found for this or any previous visit.    No results found for this or any previous visit.    Results for orders placed during the hospital encounter of 12/07/16    X-Ray Chest PA And Lateral    Narrative  EXAM: Chest X-ray PA and Lateral    CLINICAL HISTORY:     Chest pain, unspecified    COMPARISON STUDIES:     01/28/2015    FINDINGS:  Cardiomegaly.  Tortuous descending thoracic aorta.  Lungs appear clear.  Thoracic scoliosis convex to the right.. Ribs appear intact.  IMPRESSION:  No acute findings.  Please see above      Electronically signed by: WILLI MARTINEZ MD  Date:      12/07/16  Time:    08:02      No results found for this or any previous visit.      No valid procedures specified.        Results for orders placed during the hospital encounter of 08/18/22    Nuclear Stress - Cardiology Interpreted    Interpretation Summary    Normal myocardial perfusion scan. There is no evidence of myocardial ischemia or infarction.    The gated perfusion images showed an ejection fraction of 64% at rest. The gated perfusion images showed an ejection fraction of 63% post stress.    The EKG portion of this study is negative for ischemia.    The patient reported no chest pain during the stress test.    T.i.d. ratio of 1.56      No results found for this or any previous visit.      Results for orders placed during the hospital encounter of 09/23/22    Cardiac catheterization    Conclusion    The pre-procedure left ventricular end diastolic pressure was 8.    The estimated blood loss was none.    There was non-obstructive coronary artery disease..    The filling pressures on the right and left were normal.    Patent ostial LAD stent with minimal ISR    The procedure log was documented by Documenter: Sirena Ugalde RN and verified by Renee Colon MD.    Date: 9/23/2022  Time: 10:40 AM      Lab Results   Component Value Date    HGBA1C 6.1 (H) 01/24/2025         The ASCVD Risk score (Chela DK, et al., 2019) failed to calculate for the following reasons:    The valid total cholesterol range is 130 to 320 mg/dL    Diagnostic Results:  ECG: Reviewed    Assessment and Plan:   S/P coronary artery stent placement    Hyperlipidemia, unspecified hyperlipidemia type  -     isosorbide mononitrate (IMDUR) 30 MG 24 hr tablet; Take 1 tablet (30 mg total) by mouth once daily.  Dispense: 90 tablet; Refill: 3    History of coronary angiogram    Asthma, unspecified asthma severity, unspecified whether complicated, unspecified whether persistent    Hypotension, unspecified hypotension type    Other fatigue  -  "    IN OFFICE EKG 12-LEAD (to Muse)        Assessment & Plan    Z95.5 S/P coronary artery stent placement  E78.5 Hyperlipidemia, unspecified hyperlipidemia type  Z98.890 History of coronary angiogram  J45.909 Asthma, unspecified asthma severity, unspecified whether complicated, unspecified whether persistent  I95.9 Hypotension, unspecified hypotension type  R53.83 Other fatigue    Fatigue likely due to poor sleep habits rather than cardiac issues, given stable EKG and patent stent from previous cardiac catheterization.  EKG results unchanged and unremarkable compared to previous tests.    Z95.5 S/P CORONARY ARTERY STENT PLACEMENT:  - Continued Isosorbide.    I95.9 HYPOTENSION, UNSPECIFIED HYPOTENSION TYPE:  - Continue monitoring blood pressure at home.  - Continued Midrine, to be taken only if blood pressure drops too low.    R53.83 OTHER FATIGUE:  - Explained importance of good sleep hygiene and advised patient to: remove TV from bedroom, establish consistent sleep and wake times, avoid using bedroom for activities other than sleep, avoid naps during day to build sleep drive at night.  - Discussed impact of caffeine on sleep and advised limiting intake: avoid coffee in afternoon, check and avoid caffeinated sodas including "zero" versions.  - Follow up in 6 months.               Reviewed all tests and above medical conditions with patient in detail and formulated treatment plan.  Healthy weight goals were discussed in clinic    Follow up in  6 months         [1]   Social History  Tobacco Use    Smoking status: Never    Smokeless tobacco: Never   Substance Use Topics    Alcohol use: No    Drug use: No     "

## 2025-06-27 DIAGNOSIS — H40.1123 PRIMARY OPEN ANGLE GLAUCOMA (POAG) OF LEFT EYE, SEVERE STAGE: ICD-10-CM

## 2025-06-27 DIAGNOSIS — H40.1111 PRIMARY OPEN ANGLE GLAUCOMA (POAG) OF RIGHT EYE, MILD STAGE: ICD-10-CM

## 2025-06-27 RX ORDER — LATANOPROST 50 UG/ML
1 SOLUTION/ DROPS OPHTHALMIC
Qty: 2.5 ML | Refills: 11 | Status: SHIPPED | OUTPATIENT
Start: 2025-06-27

## 2025-06-27 RX ORDER — DORZOLAMIDE HCL 20 MG/ML
1 SOLUTION/ DROPS OPHTHALMIC 2 TIMES DAILY
Qty: 20 ML | Refills: 3 | Status: SHIPPED | OUTPATIENT
Start: 2025-06-27

## 2025-07-15 ENCOUNTER — OFFICE VISIT (OUTPATIENT)
Dept: OPHTHALMOLOGY | Facility: CLINIC | Age: 70
End: 2025-07-15
Payer: MEDICARE

## 2025-07-15 DIAGNOSIS — H40.1111 PRIMARY OPEN ANGLE GLAUCOMA (POAG) OF RIGHT EYE, MILD STAGE: ICD-10-CM

## 2025-07-15 DIAGNOSIS — H40.1123 PRIMARY OPEN ANGLE GLAUCOMA (POAG) OF LEFT EYE, SEVERE STAGE: Primary | ICD-10-CM

## 2025-07-15 DIAGNOSIS — H04.129 DRY EYE: ICD-10-CM

## 2025-07-15 DIAGNOSIS — H25.13 NUCLEAR SCLEROSIS, BILATERAL: ICD-10-CM

## 2025-07-15 PROCEDURE — 99999 PR PBB SHADOW E&M-EST. PATIENT-LVL I: CPT | Mod: PBBFAC,,, | Performed by: OPHTHALMOLOGY

## 2025-07-15 PROCEDURE — 3061F NEG MICROALBUMINURIA REV: CPT | Mod: CPTII,S$GLB,, | Performed by: OPHTHALMOLOGY

## 2025-07-15 PROCEDURE — 3044F HG A1C LEVEL LT 7.0%: CPT | Mod: CPTII,S$GLB,, | Performed by: OPHTHALMOLOGY

## 2025-07-15 PROCEDURE — 1126F AMNT PAIN NOTED NONE PRSNT: CPT | Mod: CPTII,S$GLB,, | Performed by: OPHTHALMOLOGY

## 2025-07-15 PROCEDURE — 99214 OFFICE O/P EST MOD 30 MIN: CPT | Mod: S$GLB,,, | Performed by: OPHTHALMOLOGY

## 2025-07-15 PROCEDURE — 3066F NEPHROPATHY DOC TX: CPT | Mod: CPTII,S$GLB,, | Performed by: OPHTHALMOLOGY

## 2025-07-15 PROCEDURE — G2211 COMPLEX E/M VISIT ADD ON: HCPCS | Mod: S$GLB,,, | Performed by: OPHTHALMOLOGY

## 2025-07-15 NOTE — PROGRESS NOTES
"HPI     Glaucoma            Comments: Pt reports for 6wk IOP check. Denies any pain or irritation. Va   stable. Using drops as directed. Noted slightly irritation eyes yesterday,   possibly dry eyes. AT seemed to help. Using drops except brimonidine to   confirm allergy.           Comments    1. (POAG) left eye, severe stage   (POAG) right eye, mild stage   SLT OS 9/26/24  SLT OS 7/26/22  SLT OD 5/30/24  /472   GCL 26/20-- 2021  GCL 23/17-- 2024  2. NS OU      Latanoprost > Lumigan  **DMX causing low BP per cardiologist  Jennifer "caused twitching"      DMX tablets by mouth- 1 whole am, 1/2 midday and 1 whole at pm  Both eyes- Latanoprost 1x daily   Both eyes- Dorzolamide 2xs daily  Both eyes- Betaxalol 2xs daily             Last edited by Noble Krishnan on 7/15/2025 10:53 AM.            Assessment /Plan     For exam results, see Encounter Report.      ICD-10-CM ICD-9-CM    1. Primary open angle glaucoma (POAG) of left eye, severe stage  H40.1123 365.11 Increased IOP OU   Thin CCT   Pt defers additional surgical interventions in the past   Pt missed some drops lately   Resume meds and compliance   Can increase DMX as tolerated     Visit today included increased complexity associated with the care and management of glaucoma and cataract. Patient aware that management of medical condition requires long term follow up.  Written instructions were placed in the portal for the patient upon closure of the encounter regarding specific use of the required medications.         365.73       2. Primary open angle glaucoma (POAG) of right eye, mild stage  H40.1111 365.11 See above      365.71       3. Nuclear sclerosis, bilateral  H25.13 366.16 You were found to have an early cataract in your eye(s) today. However the cataract is not affecting your activities of daily living, such as reading and driving. You do not need surgery at this time. We will recheck your cataract at your next visit. You are welcome to call for " an earlier appointment if your vision gets worse.         4. Dry eye  H04.129 375.15 Continue tears             DMX tablets by mouth- 1 whole am, 1/2 midday and 1 whole at pm  Both eyes- Latanoprost 1x daily   Both eyes- Dorzolamide 2xs daily  Both eyes- Betaxalol 2xs daily     Return to clinic 2-3 month IOP

## 2025-07-19 DIAGNOSIS — H40.1123 PRIMARY OPEN ANGLE GLAUCOMA (POAG) OF LEFT EYE, SEVERE STAGE: ICD-10-CM

## 2025-07-19 DIAGNOSIS — H40.1111 PRIMARY OPEN ANGLE GLAUCOMA (POAG) OF RIGHT EYE, MILD STAGE: ICD-10-CM

## 2025-07-21 RX ORDER — ACETAZOLAMIDE 250 MG/1
TABLET ORAL
Qty: 120 TABLET | Refills: 6 | Status: SHIPPED | OUTPATIENT
Start: 2025-07-21

## (undated) DEVICE — PACK CATH LAB CUSTOM BR

## (undated) DEVICE — KIT MANIFOLD LOW PRESS TUBING

## (undated) DEVICE — CATH SWAN GANZ 7FR 110CM

## (undated) DEVICE — SHEATH PINNACLE INTRO .035 4FR

## (undated) DEVICE — ANGIOTOUCH KIT

## (undated) DEVICE — SHEATH INTRO PINNACLE 7F 10CM

## (undated) DEVICE — NDL PERCUTANEOUS 21G 7CM VASC

## (undated) DEVICE — CATH ANG PIGTAIL 4FR INFINITY

## (undated) DEVICE — WIRE GUIDE TEFLON 3CM .035 145

## (undated) DEVICE — CATH 4FR JL 3.5

## (undated) DEVICE — PATCH VASC CLOSURE THROMBIX

## (undated) DEVICE — GUIDEWIRE WHOLEY HI TORQ 175CM

## (undated) DEVICE — OMNIPAQUE 300MG 150ML VIAL

## (undated) DEVICE — GUIDEWIRE EMERALD .035IN 260CM

## (undated) DEVICE — CATH INFINITI JUDKINS JR4

## (undated) DEVICE — KIT SYR REUSABLE